# Patient Record
Sex: FEMALE | Race: WHITE | NOT HISPANIC OR LATINO | Employment: UNEMPLOYED | URBAN - METROPOLITAN AREA
[De-identification: names, ages, dates, MRNs, and addresses within clinical notes are randomized per-mention and may not be internally consistent; named-entity substitution may affect disease eponyms.]

---

## 2018-03-29 ENCOUNTER — OFFICE VISIT (OUTPATIENT)
Dept: OBGYN CLINIC | Facility: CLINIC | Age: 23
End: 2018-03-29
Payer: COMMERCIAL

## 2018-03-29 VITALS
BODY MASS INDEX: 29.25 KG/M2 | DIASTOLIC BLOOD PRESSURE: 52 MMHG | HEART RATE: 80 BPM | HEIGHT: 60 IN | WEIGHT: 149 LBS | SYSTOLIC BLOOD PRESSURE: 100 MMHG

## 2018-03-29 DIAGNOSIS — O99.281 HYPOTHYROIDISM AFFECTING PREGNANCY IN FIRST TRIMESTER: ICD-10-CM

## 2018-03-29 DIAGNOSIS — E03.9 HYPOTHYROIDISM AFFECTING PREGNANCY IN FIRST TRIMESTER: ICD-10-CM

## 2018-03-29 DIAGNOSIS — Z12.4 PAP SMEAR FOR CERVICAL CANCER SCREENING: ICD-10-CM

## 2018-03-29 DIAGNOSIS — Z34.01 SUPERVISION OF LOW-RISK FIRST PREGNANCY, FIRST TRIMESTER: Primary | ICD-10-CM

## 2018-03-29 LAB
EXTERNAL CHLAMYDIA SCREEN: NEGATIVE
EXTERNAL GONORRHEA SCREEN: NEGATIVE

## 2018-03-29 PROCEDURE — 99204 OFFICE O/P NEW MOD 45 MIN: CPT | Performed by: OBSTETRICS & GYNECOLOGY

## 2018-03-29 RX ORDER — LEVOTHYROXINE SODIUM 0.07 MG/1
75 TABLET ORAL DAILY
COMMUNITY

## 2018-03-29 NOTE — PROGRESS NOTES
Pt is a 20 y o   with Patient's last menstrual period was 2017 (exact date)  who presents for prenatal care  Her LMP was normal, on time and without hormonal disruption  Her PMHx is unremarkable/notable for hashimoto's thyroidits    This is her first pregnancy  She denies history of genital HSV; her partner denies history of genital HSV  Her ethnic background is [place]  ; his is [place]    There is population risk for CF hemoglobinopathy/thalassemia--she desires testing for both  She reports a personal history of varicella at age 9    Her family history is notable for nothing unusual  His is notable for nothing unusual     Pregnancy Symptoms:  She has noted breast fullness  She is not having difficulty with nausea/vomiting  Pt declines  screening  Pt does accept blood products if need arises       Past Medical History:   Diagnosis Date    Hashimoto's disease     Varicella        Past Surgical History:   Procedure Laterality Date    MYRINGOTOMY      NOSE SURGERY           Current Outpatient Prescriptions:     levothyroxine 75 mcg tablet, Take 75 mcg by mouth daily, Disp: , Rfl:     Allergies   Allergen Reactions    Penicillins Other (See Comments)     Will result in death       OB History    Para Term  AB Living   1         0   SAB TAB Ectopic Multiple Live Births                  # Outcome Date GA Lbr Josesito/2nd Weight Sex Delivery Anes PTL Lv   1 Current                   Gyn HX:  denies STD, abnormal pap, significant dysmenorrhea or irregular menses    Social History     Social History    Marital status: /Civil Union     Spouse name: N/A    Number of children: N/A    Years of education: college     Occupational History    Housewife      Social History Main Topics    Smoking status: Never Smoker    Smokeless tobacco: Never Used    Alcohol use No    Drug use: No    Sexual activity: Yes     Partners: Male     Birth control/ protection: None Other Topics Concern    None     Social History Narrative    Religious    Would accept blood products        Family History   Problem Relation Age of Onset    Hashimoto's thyroiditis Mother     Chronic bronchitis Father        Review of Systems   Constitutional: Positive for fatigue  Negative for chills, fever and unexpected weight change  HENT: Negative for congestion, mouth sores and sore throat  Respiratory: Negative for cough, chest tightness, shortness of breath and wheezing  Cardiovascular: Negative for chest pain and palpitations  Gastrointestinal: Negative for abdominal distention, abdominal pain, constipation, diarrhea, nausea and vomiting  Endocrine: Negative for cold intolerance and heat intolerance  Genitourinary: Negative for dyspareunia, dysuria, genital sores, menstrual problem, pelvic pain, vaginal bleeding, vaginal discharge and vaginal pain  Musculoskeletal: Negative for arthralgias  Skin: Negative for color change and rash  Neurological: Negative for dizziness, light-headedness and headaches  Hematological: Negative for adenopathy  Blood pressure 100/52, pulse 80, height 4' 11 84" (1 52 m), weight 67 6 kg (149 lb), last menstrual period 12/30/2017, not currently breastfeeding  and Body mass index is 29 25 kg/m²  Physical Exam   Constitutional: She is oriented to person, place, and time  She appears well-developed and well-nourished  HENT:   Head: Normocephalic and atraumatic  Eyes: Conjunctivae and EOM are normal    Neck: Normal range of motion  Neck supple  No tracheal deviation present  No thyromegaly present  Cardiovascular: Normal rate, regular rhythm and normal heart sounds  Exam reveals no gallop and no friction rub  No murmur heard  Pulmonary/Chest: Effort normal and breath sounds normal  No stridor  No respiratory distress  She has no wheezes  She has no rales  Abdominal: Soft  Bowel sounds are normal  She exhibits no distension   There is no tenderness  There is no rebound and no guarding  Musculoskeletal: Normal range of motion  She exhibits no edema or tenderness  Neurological: She is alert and oriented to person, place, and time  Skin: Skin is warm and dry  No rash noted  No erythema  Psychiatric: She has a normal mood and affect  Her behavior is normal  Thought content normal      Breasts: breasts appear normal, no suspicious masses, no skin or nipple changes or axillary nodes, symmetric fibrous changes in both upper outer quadrants, right breast normal without mass, skin or nipple changes or axillary nodes, left breast normal without mass, skin or nipple changes or axillary nodes  vulva: normal external genitalia for age and no lesions, masses, epithelial changes, or exudate  vagina: color pink, rugae  well formed rugae, discharge  white and bleeding  without any bleeding  cervix: nullip, no lesions  and pap obtained  uterus: NSSC, AF, NT, mobile and 12 wks  adnexa: no masses or tenderness      A/P: Pt is a 20 y o   with    Pt has been counseled re diet, exercise, weight gain, foods to avoid, BF, vaccines in pregnancy, trisomy screening, vector borne illness and prevention, routine dental care, travel precautions to include seat belt use and VTE risk reduction  She has been provided our pregnancy packet which includes how and when to contact providers, medication recommendations, dietary suggestions, breastfeeding information as well as websites for additional information, hospital and delivery concerns, etc     Diagnoses and all orders for this visit:    Supervision of low-risk first pregnancy, first trimester  -     Thinprep Tis Pap Reflex HPV mRNA E6/E7, Chlamydia/N gonorrhoeae  -     Cystic fibrosis gene test; Future  -     Hemoglobin Electrophoresis; Future  -     HIV 1/2 AG-AB combo; Future  -     Obstetric panel; Future  -     T4, free; Future  -     TSH, 3rd generation;  Future    Hypothyroidism affecting pregnancy in first trimester  -     Cystic fibrosis gene test; Future  -     Hemoglobin Electrophoresis; Future  -     HIV 1/2 AG-AB combo; Future  -     Obstetric panel; Future  -     T4, free; Future  -     TSH, 3rd generation;  Future    Pap smear for cervical cancer screening  -     Thinprep Tis Pap Reflex HPV mRNA E6/E7, Chlamydia/N gonorrhoeae

## 2018-04-02 LAB
C TRACH RRNA SPEC QL NAA+PROBE: NOT DETECTED
CLINICAL INFO: NORMAL
CYTO CVX: NORMAL
CYTOLOGY CMNT CVX/VAG CYTO-IMP: NORMAL
DATE PREVIOUS BX: NORMAL
LMP START DATE: NORMAL
N GONORRHOEA RRNA SPEC QL NAA+PROBE: NOT DETECTED
SL AMB PREV. PAP:: NORMAL
SPECIMEN SOURCE CVX/VAG CYTO: NORMAL

## 2018-04-11 ENCOUNTER — OB ABSTRACT (OUTPATIENT)
Dept: OBGYN CLINIC | Facility: CLINIC | Age: 23
End: 2018-04-11

## 2018-04-26 ENCOUNTER — ROUTINE PRENATAL (OUTPATIENT)
Dept: OBGYN CLINIC | Facility: CLINIC | Age: 23
End: 2018-04-26
Payer: COMMERCIAL

## 2018-04-26 VITALS
BODY MASS INDEX: 30.04 KG/M2 | DIASTOLIC BLOOD PRESSURE: 64 MMHG | OXYGEN SATURATION: 99 % | HEART RATE: 87 BPM | WEIGHT: 153 LBS | SYSTOLIC BLOOD PRESSURE: 110 MMHG

## 2018-04-26 DIAGNOSIS — Z34.02 SUPERVISION OF LOW-RISK FIRST PREGNANCY, SECOND TRIMESTER: ICD-10-CM

## 2018-04-26 DIAGNOSIS — O99.282 HYPOTHYROIDISM AFFECTING PREGNANCY IN SECOND TRIMESTER: Primary | ICD-10-CM

## 2018-04-26 DIAGNOSIS — E03.9 HYPOTHYROIDISM AFFECTING PREGNANCY IN SECOND TRIMESTER: Primary | ICD-10-CM

## 2018-04-26 PROCEDURE — 99213 OFFICE O/P EST LOW 20 MIN: CPT | Performed by: OBSTETRICS & GYNECOLOGY

## 2018-04-26 NOTE — PROGRESS NOTES
Pt reports she feels well  Unsure if she has had quickening yet  She denies vb, lof ctx  PTL Precautions reviewed  Has not had pnl labs drawn yet  Encouraged to do so  F/U 4 weeks  Has anatomy scan scheduled

## 2018-04-28 PROBLEM — O26.892 RH NEGATIVE, ANTEPARTUM, SECOND TRIMESTER: Status: ACTIVE | Noted: 2018-04-28

## 2018-04-28 PROBLEM — Z67.91 RH NEGATIVE, ANTEPARTUM, SECOND TRIMESTER: Status: ACTIVE | Noted: 2018-04-28

## 2018-05-04 LAB
ABO GROUP BLD: ABNORMAL
BASOPHILS # BLD AUTO: 12 CELLS/UL (ref 0–200)
BASOPHILS NFR BLD AUTO: 0.2 %
BLD GP AB SCN SERPL QL: ABNORMAL
CFTR MUT ANL BLD/T: NORMAL
CFTR MUT ANL BLD/T: NORMAL
CFTR MUT TESTED BLD/T: NORMAL
EOSINOPHIL # BLD AUTO: 53 CELLS/UL (ref 15–500)
EOSINOPHIL NFR BLD AUTO: 0.9 %
ERYTHROCYTE [DISTWIDTH] IN BLOOD BY AUTOMATED COUNT: 13.3 % (ref 11–15)
ERYTHROCYTE [DISTWIDTH] IN BLOOD BY AUTOMATED COUNT: 13.3 % (ref 11–15)
HBV SURFACE AG SERPL QL IA: ABNORMAL
HCT VFR BLD AUTO: 34 % (ref 35–45)
HCT VFR BLD AUTO: 34 % (ref 35–45)
HGB A MFR BLD: 96.2 %
HGB A2 MFR BLD: 2.2 % (ref 1.8–3.5)
HGB BLD-MCNC: 11.5 G/DL (ref 11.7–15.5)
HGB BLD-MCNC: 11.5 G/DL (ref 11.7–15.5)
HGB F MFR BLD: 1.6 %
HGB FRACT BLD-IMP: ABNORMAL
HIV 1+2 AB+HIV1 P24 AG SERPL QL IA: NORMAL
LYMPHOCYTES # BLD AUTO: 1634 CELLS/UL (ref 850–3900)
LYMPHOCYTES NFR BLD AUTO: 27.7 %
MCH RBC QN AUTO: 29.2 PG (ref 27–33)
MCH RBC QN AUTO: 29.2 PG (ref 27–33)
MCHC RBC AUTO-ENTMCNC: 33.8 G/DL (ref 32–36)
MCV RBC AUTO: 86.3 FL (ref 80–100)
MCV RBC AUTO: 86.3 FL (ref 80–100)
MONOCYTES # BLD AUTO: 413 CELLS/UL (ref 200–950)
MONOCYTES NFR BLD AUTO: 7 %
NEUTROPHILS # BLD AUTO: 3788 CELLS/UL (ref 1500–7800)
NEUTROPHILS NFR BLD AUTO: 64.2 %
PLATELET # BLD AUTO: 248 THOUSAND/UL (ref 140–400)
PMV BLD REES-ECKER: 10.5 FL (ref 7.5–12.5)
RBC # BLD AUTO: 3.94 MILLION/UL (ref 3.8–5.1)
RBC # BLD AUTO: 3.94 MILLION/UL (ref 3.8–5.1)
REF LAB TEST METHOD: NORMAL
RH BLD: ABNORMAL
RPR SER QL: ABNORMAL
RUBV IGG SERPL IA-ACNC: 2.02 INDEX
SERVICE CMNT-IMP: NORMAL
T4 FREE SERPL-MCNC: 1 NG/DL (ref 0.8–1.8)
TSH SERPL-ACNC: 1.9 MIU/L
WBC # BLD AUTO: 5.9 THOUSAND/UL (ref 3.8–10.8)

## 2018-05-13 PROBLEM — Z67.91 RH NEGATIVE, ANTEPARTUM, FIRST TRIMESTER: Status: ACTIVE | Noted: 2018-05-13

## 2018-05-13 PROBLEM — O26.891 RH NEGATIVE, ANTEPARTUM, FIRST TRIMESTER: Status: ACTIVE | Noted: 2018-05-13

## 2018-05-22 ENCOUNTER — ULTRASOUND (OUTPATIENT)
Dept: OBGYN CLINIC | Facility: CLINIC | Age: 23
End: 2018-05-22
Payer: COMMERCIAL

## 2018-05-22 DIAGNOSIS — Z36.89 ENCOUNTER FOR FETAL ANATOMIC SURVEY: Primary | ICD-10-CM

## 2018-05-22 DIAGNOSIS — Z36.89 ENCOUNTER FOR ULTRASOUND TO ASSESS FETAL GROWTH: ICD-10-CM

## 2018-05-22 PROCEDURE — 76805 OB US >/= 14 WKS SNGL FETUS: CPT

## 2018-05-22 NOTE — Clinical Note
Please notify the patient that her anatomic survey appears normal, but that the heart was not clearly seen due to fetal position--will need follow up to 4 chamber heart, RVOT, LVOT at 28 weeks  Please coordinate with her visit   Thanks

## 2018-05-24 PROBLEM — O26.891 RH NEGATIVE, ANTEPARTUM, FIRST TRIMESTER: Status: RESOLVED | Noted: 2018-05-13 | Resolved: 2018-05-24

## 2018-05-24 PROBLEM — Z67.91 RH NEGATIVE, ANTEPARTUM, FIRST TRIMESTER: Status: RESOLVED | Noted: 2018-05-13 | Resolved: 2018-05-24

## 2018-06-05 ENCOUNTER — ROUTINE PRENATAL (OUTPATIENT)
Dept: OBGYN CLINIC | Facility: CLINIC | Age: 23
End: 2018-06-05

## 2018-06-05 VITALS
OXYGEN SATURATION: 99 % | HEART RATE: 100 BPM | BODY MASS INDEX: 30.04 KG/M2 | WEIGHT: 153 LBS | SYSTOLIC BLOOD PRESSURE: 100 MMHG | DIASTOLIC BLOOD PRESSURE: 62 MMHG

## 2018-06-05 DIAGNOSIS — Z34.02 SUPERVISION OF LOW-RISK FIRST PREGNANCY, SECOND TRIMESTER: ICD-10-CM

## 2018-06-05 DIAGNOSIS — Z67.91 RH NEGATIVE, ANTEPARTUM, SECOND TRIMESTER: Primary | ICD-10-CM

## 2018-06-05 DIAGNOSIS — E03.9 HYPOTHYROIDISM AFFECTING PREGNANCY IN SECOND TRIMESTER: ICD-10-CM

## 2018-06-05 DIAGNOSIS — O26.892 RH NEGATIVE, ANTEPARTUM, SECOND TRIMESTER: Primary | ICD-10-CM

## 2018-06-05 DIAGNOSIS — O99.282 HYPOTHYROIDISM AFFECTING PREGNANCY IN SECOND TRIMESTER: ICD-10-CM

## 2018-06-05 PROCEDURE — PNV: Performed by: OBSTETRICS & GYNECOLOGY

## 2018-06-05 NOTE — PROGRESS NOTES
Pt w o complaints  She reports +FM  Denies vb, lof, ctx  PTL precautions reviewed  Pt had her anatomy scan with --need copy from   Reviewed with patient her pnl labs are wnl  Reviewed that her blood type is A negative  She reports her  is A+  Reviewed need for Rhogam with bleeding or at 28 weeks and again at delivery  F/U 4 weeks

## 2018-06-28 ENCOUNTER — ROUTINE PRENATAL (OUTPATIENT)
Dept: OBGYN CLINIC | Facility: CLINIC | Age: 23
End: 2018-06-28

## 2018-06-28 VITALS — BODY MASS INDEX: 31.22 KG/M2 | WEIGHT: 159 LBS | SYSTOLIC BLOOD PRESSURE: 120 MMHG | DIASTOLIC BLOOD PRESSURE: 64 MMHG

## 2018-06-28 DIAGNOSIS — O26.892 RH NEGATIVE, ANTEPARTUM, SECOND TRIMESTER: ICD-10-CM

## 2018-06-28 DIAGNOSIS — O99.282 HYPOTHYROIDISM AFFECTING PREGNANCY IN SECOND TRIMESTER: Primary | ICD-10-CM

## 2018-06-28 DIAGNOSIS — Z34.02 SUPERVISION OF LOW-RISK FIRST PREGNANCY, SECOND TRIMESTER: ICD-10-CM

## 2018-06-28 DIAGNOSIS — Z67.91 RH NEGATIVE, ANTEPARTUM, SECOND TRIMESTER: ICD-10-CM

## 2018-06-28 DIAGNOSIS — E03.9 HYPOTHYROIDISM AFFECTING PREGNANCY IN SECOND TRIMESTER: Primary | ICD-10-CM

## 2018-06-28 PROCEDURE — PNV: Performed by: OBSTETRICS & GYNECOLOGY

## 2018-06-28 NOTE — PROGRESS NOTES
Pt reports recent cold with sinus pressure  She reports she had a pain x 1 minute x 1, unsure if contraction or not  Pt reports +FM  Denies vb, lof  PTL precautions reviewed  3d trimester lab slips given  F/u 4 weeks  RHOGAM next visit

## 2018-07-09 LAB
EXTERNAL ABO GROUPING: NORMAL
EXTERNAL HEMATOCRIT: 30.5 %
EXTERNAL HEMOGLOBIN: 10.1 G/DL
EXTERNAL PLATELET COUNT: 186 K/ΜL
EXTERNAL RH FACTOR: NEGATIVE
EXTERNAL SYPHILIS RPR SCREEN: NORMAL

## 2018-07-10 LAB
ABO GROUP BLD: NORMAL
BASOPHILS # BLD AUTO: 20 CELLS/UL (ref 0–200)
BASOPHILS NFR BLD AUTO: 0.3 %
EOSINOPHIL # BLD AUTO: 39 CELLS/UL (ref 15–500)
EOSINOPHIL NFR BLD AUTO: 0.6 %
ERYTHROCYTE [DISTWIDTH] IN BLOOD BY AUTOMATED COUNT: 13.1 % (ref 11–15)
GLUCOSE 1H P 50 G GLC PO SERPL-MCNC: 153 MG/DL
HCT VFR BLD AUTO: 30.5 % (ref 35–45)
HGB BLD-MCNC: 10.1 G/DL (ref 11.7–15.5)
LYMPHOCYTES # BLD AUTO: 1703 CELLS/UL (ref 850–3900)
LYMPHOCYTES NFR BLD AUTO: 26.2 %
MCH RBC QN AUTO: 29.5 PG (ref 27–33)
MCHC RBC AUTO-ENTMCNC: 33.1 G/DL (ref 32–36)
MCV RBC AUTO: 89.2 FL (ref 80–100)
MONOCYTES # BLD AUTO: 254 CELLS/UL (ref 200–950)
MONOCYTES NFR BLD AUTO: 3.9 %
NEUTROPHILS # BLD AUTO: 4485 CELLS/UL (ref 1500–7800)
NEUTROPHILS NFR BLD AUTO: 69 %
PLATELET # BLD AUTO: 186 THOUSAND/UL (ref 140–400)
PMV BLD REES-ECKER: 11 FL (ref 7.5–12.5)
RBC # BLD AUTO: 3.42 MILLION/UL (ref 3.8–5.1)
RH BLD: NORMAL
RPR SER QL: NORMAL
T4 FREE SERPL-MCNC: 1 NG/DL (ref 0.8–1.8)
TSH SERPL-ACNC: 2.27 MIU/L
WBC # BLD AUTO: 6.5 THOUSAND/UL (ref 3.8–10.8)

## 2018-07-19 DIAGNOSIS — O28.9 ABNORMAL ANTENATAL TEST: Primary | ICD-10-CM

## 2018-07-19 PROBLEM — O99.013 ANEMIA AFFECTING PREGNANCY IN THIRD TRIMESTER: Status: ACTIVE | Noted: 2018-07-19

## 2018-07-19 NOTE — PROGRESS NOTES
Please notify the patient that her 1 hour GTT was elevated and that I recommend a 3 hour gtt  Orders have been placed in her chart  She also has a mild anemia and I recommend she takes iron once daily  Her TFTs are normal  She should receive rhogam at her next visit   Thanks

## 2018-07-20 ENCOUNTER — ULTRASOUND (OUTPATIENT)
Dept: OBGYN CLINIC | Facility: CLINIC | Age: 23
End: 2018-07-20
Payer: COMMERCIAL

## 2018-07-20 ENCOUNTER — ROUTINE PRENATAL (OUTPATIENT)
Dept: OBGYN CLINIC | Facility: CLINIC | Age: 23
End: 2018-07-20
Payer: COMMERCIAL

## 2018-07-20 VITALS
DIASTOLIC BLOOD PRESSURE: 70 MMHG | BODY MASS INDEX: 31.57 KG/M2 | OXYGEN SATURATION: 99 % | HEART RATE: 85 BPM | WEIGHT: 160.8 LBS | SYSTOLIC BLOOD PRESSURE: 114 MMHG

## 2018-07-20 DIAGNOSIS — Z67.91 RH NEGATIVE, ANTEPARTUM, THIRD TRIMESTER: ICD-10-CM

## 2018-07-20 DIAGNOSIS — O99.013 ANEMIA AFFECTING PREGNANCY IN THIRD TRIMESTER: ICD-10-CM

## 2018-07-20 DIAGNOSIS — O99.283 HYPOTHYROIDISM AFFECTING PREGNANCY IN THIRD TRIMESTER: Primary | ICD-10-CM

## 2018-07-20 DIAGNOSIS — O26.893 RH NEGATIVE, ANTEPARTUM, THIRD TRIMESTER: ICD-10-CM

## 2018-07-20 DIAGNOSIS — E03.9 HYPOTHYROIDISM AFFECTING PREGNANCY IN THIRD TRIMESTER: Primary | ICD-10-CM

## 2018-07-20 DIAGNOSIS — IMO0002 EVALUATE ANATOMY NOT SEEN ON PRIOR SONOGRAM: Primary | ICD-10-CM

## 2018-07-20 PROCEDURE — 76816 OB US FOLLOW-UP PER FETUS: CPT | Performed by: OBSTETRICS & GYNECOLOGY

## 2018-07-20 PROCEDURE — PNV: Performed by: OBSTETRICS & GYNECOLOGY

## 2018-07-20 NOTE — PROGRESS NOTES
Pt w o complaints  She reports +FM  Denies vb, lof, ctx  PTL precautions and fkc reviewed  3d trimester labs reviewed with patient TFTS wnl  1 hour gtt elevated, mild anemia noted and iron recommended  RPR non reactive  3 hour GTT slips given  Rhogam today

## 2018-07-27 LAB
GLUCOSE 1H P CHAL SERPL-MCNC: 117 MG/DL
GLUCOSE 2H P CHAL SERPL-MCNC: 96 MG/DL
GLUCOSE 3H P 100 G GLC PO SERPL-MCNC: 128 MG/DL
GLUCOSE P FAST SERPL-MCNC: 72 MG/DL (ref 65–94)

## 2018-08-02 ENCOUNTER — ROUTINE PRENATAL (OUTPATIENT)
Dept: OBGYN CLINIC | Facility: CLINIC | Age: 23
End: 2018-08-02

## 2018-08-02 VITALS — SYSTOLIC BLOOD PRESSURE: 102 MMHG | WEIGHT: 162 LBS | DIASTOLIC BLOOD PRESSURE: 68 MMHG | BODY MASS INDEX: 31.8 KG/M2

## 2018-08-02 DIAGNOSIS — O99.013 ANEMIA AFFECTING PREGNANCY IN THIRD TRIMESTER: ICD-10-CM

## 2018-08-02 DIAGNOSIS — Z67.91 RH NEGATIVE, ANTEPARTUM, THIRD TRIMESTER: ICD-10-CM

## 2018-08-02 DIAGNOSIS — O26.893 RH NEGATIVE, ANTEPARTUM, THIRD TRIMESTER: ICD-10-CM

## 2018-08-02 DIAGNOSIS — E03.9 HYPOTHYROIDISM AFFECTING PREGNANCY IN THIRD TRIMESTER: Primary | ICD-10-CM

## 2018-08-02 DIAGNOSIS — O99.283 HYPOTHYROIDISM AFFECTING PREGNANCY IN THIRD TRIMESTER: Primary | ICD-10-CM

## 2018-08-02 PROCEDURE — PNV: Performed by: OBSTETRICS & GYNECOLOGY

## 2018-08-02 RX ORDER — PNV NO.95/FERROUS FUM/FOLIC AC 28MG-0.8MG
TABLET ORAL
COMMUNITY

## 2018-08-02 NOTE — PROGRESS NOTES
Pt w o complaints  She reports +FM  Denies vb, lof, ctx  PTL precautions and fkc reviewed  3 hour gtt wnl and reviewed with patient  F/u 2 weeks  Information given regarding TDAP as pt is undecided regarding vaccination in pregnancy

## 2018-08-16 ENCOUNTER — ROUTINE PRENATAL (OUTPATIENT)
Dept: OBGYN CLINIC | Facility: CLINIC | Age: 23
End: 2018-08-16
Payer: COMMERCIAL

## 2018-08-16 VITALS
HEART RATE: 105 BPM | DIASTOLIC BLOOD PRESSURE: 58 MMHG | SYSTOLIC BLOOD PRESSURE: 110 MMHG | OXYGEN SATURATION: 99 % | BODY MASS INDEX: 32.35 KG/M2 | WEIGHT: 164.8 LBS

## 2018-08-16 DIAGNOSIS — B37.2 YEAST DERMATITIS: ICD-10-CM

## 2018-08-16 DIAGNOSIS — Z23 NEED FOR TDAP VACCINATION: ICD-10-CM

## 2018-08-16 DIAGNOSIS — E03.9 HYPOTHYROIDISM AFFECTING PREGNANCY IN THIRD TRIMESTER: Primary | ICD-10-CM

## 2018-08-16 DIAGNOSIS — O26.893 RH NEGATIVE, ANTEPARTUM, THIRD TRIMESTER: ICD-10-CM

## 2018-08-16 DIAGNOSIS — O99.013 ANEMIA AFFECTING PREGNANCY IN THIRD TRIMESTER: ICD-10-CM

## 2018-08-16 DIAGNOSIS — Z67.91 RH NEGATIVE, ANTEPARTUM, THIRD TRIMESTER: ICD-10-CM

## 2018-08-16 DIAGNOSIS — O99.283 HYPOTHYROIDISM AFFECTING PREGNANCY IN THIRD TRIMESTER: Primary | ICD-10-CM

## 2018-08-16 PROCEDURE — 90715 TDAP VACCINE 7 YRS/> IM: CPT

## 2018-08-16 PROCEDURE — PNV: Performed by: OBSTETRICS & GYNECOLOGY

## 2018-08-16 PROCEDURE — 90471 IMMUNIZATION ADMIN: CPT

## 2018-08-16 RX ORDER — NYSTATIN 100000 [USP'U]/G
POWDER TOPICAL 2 TIMES DAILY
Qty: 45 G | Refills: 0 | Status: ON HOLD | OUTPATIENT
Start: 2018-08-16 | End: 2018-10-08 | Stop reason: ALTCHOICE

## 2018-08-16 NOTE — PROGRESS NOTES
Pt reports dark spots between her breasts and on her groin that are spreading  She reports +FM  Denies vb, lof, ctx  PTL precautions and fkc reviewed  TDAP today  Areas examined and are consistent with yeast dermatitis--antifungal powder prescribed  F/u 2 week

## 2018-08-30 ENCOUNTER — ROUTINE PRENATAL (OUTPATIENT)
Dept: OBGYN CLINIC | Facility: CLINIC | Age: 23
End: 2018-08-30

## 2018-08-30 VITALS — WEIGHT: 168 LBS | BODY MASS INDEX: 32.98 KG/M2 | DIASTOLIC BLOOD PRESSURE: 64 MMHG | SYSTOLIC BLOOD PRESSURE: 98 MMHG

## 2018-08-30 DIAGNOSIS — E03.9 HYPOTHYROIDISM AFFECTING PREGNANCY IN THIRD TRIMESTER: Primary | ICD-10-CM

## 2018-08-30 DIAGNOSIS — O99.013 ANEMIA AFFECTING PREGNANCY IN THIRD TRIMESTER: ICD-10-CM

## 2018-08-30 DIAGNOSIS — O26.893 RH NEGATIVE, ANTEPARTUM, THIRD TRIMESTER: ICD-10-CM

## 2018-08-30 DIAGNOSIS — Z67.91 RH NEGATIVE, ANTEPARTUM, THIRD TRIMESTER: ICD-10-CM

## 2018-08-30 DIAGNOSIS — O99.283 HYPOTHYROIDISM AFFECTING PREGNANCY IN THIRD TRIMESTER: Primary | ICD-10-CM

## 2018-08-30 PROCEDURE — PNV: Performed by: OBSTETRICS & GYNECOLOGY

## 2018-08-30 NOTE — PROGRESS NOTES
Pt reports that her rash has resolved with the powder we gave her last visit  She reports +FM  Denies vb, lof, ctx  PTL precautions and fkc reviewed  GBS and consents next visit  Pt requesting growth u/s at term  F/U 1-2 weeks

## 2018-09-06 ENCOUNTER — ROUTINE PRENATAL (OUTPATIENT)
Dept: OBGYN CLINIC | Facility: CLINIC | Age: 23
End: 2018-09-06

## 2018-09-06 VITALS
BODY MASS INDEX: 33.06 KG/M2 | SYSTOLIC BLOOD PRESSURE: 110 MMHG | DIASTOLIC BLOOD PRESSURE: 70 MMHG | OXYGEN SATURATION: 98 % | HEART RATE: 106 BPM | WEIGHT: 168.4 LBS

## 2018-09-06 DIAGNOSIS — Z67.91 RH NEGATIVE, ANTEPARTUM, THIRD TRIMESTER: ICD-10-CM

## 2018-09-06 DIAGNOSIS — O99.013 ANEMIA AFFECTING PREGNANCY IN THIRD TRIMESTER: ICD-10-CM

## 2018-09-06 DIAGNOSIS — O26.893 RH NEGATIVE, ANTEPARTUM, THIRD TRIMESTER: ICD-10-CM

## 2018-09-06 DIAGNOSIS — O99.283 HYPOTHYROIDISM AFFECTING PREGNANCY IN THIRD TRIMESTER: Primary | ICD-10-CM

## 2018-09-06 DIAGNOSIS — E03.9 HYPOTHYROIDISM AFFECTING PREGNANCY IN THIRD TRIMESTER: Primary | ICD-10-CM

## 2018-09-06 PROCEDURE — PNV: Performed by: OBSTETRICS & GYNECOLOGY

## 2018-09-06 NOTE — PROGRESS NOTES
Pt is without complaints  She reports +FM  Denies vb, lof  Occ ctx  Labor precautions and fkc reviewed  Pt brings birth plan with her--reviewed and scanned into chart  GBS collected to be run with sensitivities due to PCN allergy  Consents reviewed and signed  F/u 1 week   U/S for growth with next visit

## 2018-09-12 ENCOUNTER — HOSPITAL ENCOUNTER (OUTPATIENT)
Facility: HOSPITAL | Age: 23
Discharge: HOME | End: 2018-09-12
Attending: OBSTETRICS & GYNECOLOGY | Admitting: OBSTETRICS & GYNECOLOGY
Payer: COMMERCIAL

## 2018-09-12 VITALS
BODY MASS INDEX: 28.68 KG/M2 | RESPIRATION RATE: 16 BRPM | WEIGHT: 168 LBS | OXYGEN SATURATION: 99 % | HEIGHT: 64 IN | SYSTOLIC BLOOD PRESSURE: 110 MMHG | HEART RATE: 75 BPM | TEMPERATURE: 98.2 F | DIASTOLIC BLOOD PRESSURE: 69 MMHG

## 2018-09-12 PROBLEM — Z3A.36 36 WEEKS GESTATION OF PREGNANCY: Status: ACTIVE | Noted: 2018-09-12

## 2018-09-12 PROCEDURE — 99213 OFFICE O/P EST LOW 20 MIN: CPT

## 2018-09-13 NOTE — DISCHARGE INSTRUCTIONS
Pregnancy at 28 to 38 Weeks   AMBULATORY CARE:   What changes are happening to your body: You are considered full term at the beginning of 37 weeks  Your breathing may be easier if your baby has moved down into a head-down position  You may need to urinate more often because the baby may be pressing on your bladder  You may also feel more discomfort and get tired easily  Seek care immediately if:   · You develop a severe headache that does not go away  · You have new or increased vision changes, such as blurred or spotted vision  · You have new or increased swelling in your face or hands  · You have vaginal spotting or bleeding  · Your water broke or you feel warm water gushing or trickling from your vagina  Contact your healthcare provider if:   · You have more than 5 contractions in 1 hour  · You notice any changes in your baby's movements  · You have abdominal cramps, pressure, or tightening  · You have a change in vaginal discharge  · You have chills or a fever  · You have vaginal itching, burning, or pain  · You have yellow, green, white, or foul-smelling vaginal discharge  · You have pain or burning when you urinate, less urine than usual, or pink or bloody urine  · You have questions or concerns about your condition or care  How to care for yourself at this stage of your pregnancy:   · Eat a variety of healthy foods  Healthy foods include fruits, vegetables, whole-grain breads, low-fat dairy foods, beans, lean meats, and fish  Drink liquids as directed  Ask how much liquid to drink each day and which liquids are best for you  Limit caffeine to less than 200 milligrams each day  Limit your intake of fish to 2 servings each week  Choose fish low in mercury such as canned light tuna, shrimp, crab, salmon, cod, or tilapia  Do not  eat fish high in mercury such as swordfish, tilefish, frankie mackerel, and shark  · Take prenatal vitamins as directed    Your need for certain vitamins and minerals, such as folic acid, increases during pregnancy  Prenatal vitamins provide some of the extra vitamins and minerals you need  Prenatal vitamins may also help to decrease the risk of certain birth defects  · Rest as needed  Put your feet up if you have swelling in your ankles and feet  · Do not smoke  If you smoke, it is never too late to quit  Smoking increases your risk of a miscarriage and other health problems during your pregnancy  Smoking can cause your baby to be born too early or weigh less at birth  Ask your healthcare provider for information if you need help quitting  · Do not drink alcohol  Alcohol passes from your body to your baby through the placenta  It can affect your baby's brain development and cause fetal alcohol syndrome (FAS)  FAS is a group of conditions that causes mental, behavior, and growth problems  · Talk to your healthcare provider before you take any medicines  Many medicines may harm your baby if you take them when you are pregnant  Do not take any medicines, vitamins, herbs, or supplements without first talking to your healthcare provider  Never use illegal or street drugs (such as marijuana or cocaine) while you are pregnant  · Talk to your healthcare provider before you travel  You may not be able to travel in an airplane after 36 weeks  He may also recommend that you avoid long road trips  Safety tips during pregnancy:   · Avoid hot tubs and saunas  Do not use a hot tub or sauna while you are pregnant, especially during your first trimester  Hot tubs and saunas may raise your baby's temperature and increase the risk of birth defects  · Avoid toxoplasmosis  This is an infection caused by eating raw meat or being around infected cat feces  It can cause birth defects, miscarriages, and other problems  Wash your hands after you touch raw meat  Make sure any meat is well-cooked before you eat it   Avoid raw eggs and unpasteurized milk  Use gloves or ask someone else to clean your cat's litter box while you are pregnant  · Ask your healthcare provider about travel  The most comfortable time to travel is during the second trimester  Ask your healthcare provider if you can travel after 36 weeks  You may not be able to travel in an airplane after 36 weeks  He may also recommend that you avoid long road trips  Changes that are happening with your baby:  By 38 weeks, your baby may weigh between 6 and 9 pounds  Your baby may be about 14 inches long from the top of the head to the rump (baby's bottom)  Your baby hears well enough to know your voice  As your baby gets larger, you may feel fewer kicks and more stretching and rolling  Your baby may move into a head-down position  Your baby will also rest lower in your abdomen  What you need to know about prenatal care: Your healthcare provider will check your blood pressure and weight  You may also need the following:  · A urine test  may also be done to check for sugar and protein  These can be signs of gestational diabetes or infection  Protein in your urine may also be a sign of preeclampsia  Preeclampsia is a condition that can develop during week 20 or later of your pregnancy  It causes high blood pressure, and it can cause problems with your kidneys and other organs  · A blood test  may be done to check for anemia (low iron level)  · A Tdap vaccine  may be recommended by your healthcare provider  · A group B strep test  is a test that is done to check for group B strep infection  Group B strep is a type of bacteria that may be found in the vagina or rectum  It can be passed to your baby during delivery if you have it  Your healthcare provider will take swab your vagina or rectum and send the sample to the lab for tests  · Fundal height  is a measurement of your uterus to check your baby's growth   This number is usually the same as the number of weeks that you have been pregnant  Your healthcare provider may also check your baby's position  · Your baby's heart rate  will be checked  © 2017 [address] [employer] Information is for End User's use only and may not be sold, redistributed or otherwise used for commercial purposes  All illustrations and images included in CareNotes® are the copyrighted property of [employer]  or [employer]  The above information is an  only  It is not intended as medical advice for individual conditions or treatments  Talk to your doctor, nurse or pharmacist before following any medical regimen to see if it is safe and effective for you

## 2018-09-14 ENCOUNTER — ULTRASOUND (OUTPATIENT)
Dept: OBGYN CLINIC | Facility: CLINIC | Age: 23
End: 2018-09-14
Payer: COMMERCIAL

## 2018-09-14 ENCOUNTER — ROUTINE PRENATAL (OUTPATIENT)
Dept: OBGYN CLINIC | Facility: CLINIC | Age: 23
End: 2018-09-14
Payer: COMMERCIAL

## 2018-09-14 VITALS — BODY MASS INDEX: 29.18 KG/M2 | DIASTOLIC BLOOD PRESSURE: 64 MMHG | SYSTOLIC BLOOD PRESSURE: 108 MMHG | WEIGHT: 170 LBS

## 2018-09-14 DIAGNOSIS — O99.013 ANEMIA AFFECTING PREGNANCY IN THIRD TRIMESTER: ICD-10-CM

## 2018-09-14 DIAGNOSIS — Z67.91 RH NEGATIVE, ANTEPARTUM, THIRD TRIMESTER: ICD-10-CM

## 2018-09-14 DIAGNOSIS — Z36.89 ENCOUNTER FOR ULTRASOUND TO ASSESS FETAL GROWTH: Primary | ICD-10-CM

## 2018-09-14 DIAGNOSIS — E03.9 HYPOTHYROIDISM AFFECTING PREGNANCY IN THIRD TRIMESTER: Primary | ICD-10-CM

## 2018-09-14 DIAGNOSIS — O99.283 HYPOTHYROIDISM AFFECTING PREGNANCY IN THIRD TRIMESTER: Primary | ICD-10-CM

## 2018-09-14 DIAGNOSIS — O26.893 RH NEGATIVE, ANTEPARTUM, THIRD TRIMESTER: ICD-10-CM

## 2018-09-14 PROCEDURE — 76816 OB US FOLLOW-UP PER FETUS: CPT | Performed by: OBSTETRICS & GYNECOLOGY

## 2018-09-14 PROCEDURE — 99213 OFFICE O/P EST LOW 20 MIN: CPT | Performed by: OBSTETRICS & GYNECOLOGY

## 2018-09-14 NOTE — PROGRESS NOTES
Pt w o complaints  She reports +FM  Denies vb, lof, ctx  GBS negative and reviewed with patient  EFW 53% today  Labor precautions and fkc reviewed   F/U 1 week

## 2018-09-20 ENCOUNTER — ROUTINE PRENATAL (OUTPATIENT)
Dept: OBGYN CLINIC | Facility: CLINIC | Age: 23
End: 2018-09-20
Payer: COMMERCIAL

## 2018-09-20 VITALS
HEIGHT: 64 IN | HEART RATE: 94 BPM | DIASTOLIC BLOOD PRESSURE: 66 MMHG | OXYGEN SATURATION: 98 % | WEIGHT: 173 LBS | BODY MASS INDEX: 29.53 KG/M2 | SYSTOLIC BLOOD PRESSURE: 110 MMHG

## 2018-09-20 DIAGNOSIS — O99.283 HYPOTHYROIDISM AFFECTING PREGNANCY IN THIRD TRIMESTER: Primary | ICD-10-CM

## 2018-09-20 DIAGNOSIS — O26.893 RH NEGATIVE, ANTEPARTUM, THIRD TRIMESTER: ICD-10-CM

## 2018-09-20 DIAGNOSIS — E03.9 HYPOTHYROIDISM AFFECTING PREGNANCY IN THIRD TRIMESTER: Primary | ICD-10-CM

## 2018-09-20 DIAGNOSIS — O99.013 ANEMIA AFFECTING PREGNANCY IN THIRD TRIMESTER: ICD-10-CM

## 2018-09-20 DIAGNOSIS — Z67.91 RH NEGATIVE, ANTEPARTUM, THIRD TRIMESTER: ICD-10-CM

## 2018-09-20 PROBLEM — Z3A.36 36 WEEKS GESTATION OF PREGNANCY: Status: RESOLVED | Noted: 2018-09-12 | Resolved: 2018-09-20

## 2018-09-20 PROCEDURE — 99213 OFFICE O/P EST LOW 20 MIN: CPT | Performed by: OBSTETRICS & GYNECOLOGY

## 2018-09-20 NOTE — PROGRESS NOTES
Pt reports irregular contractions about 10x/day  She notes a spot of pink with mucus yesterday, but no further bleeding  She reports +FM  Denies lof  Labor precautions and [facility] reviewed  : 1, mid consistency, midposition

## 2018-09-25 ENCOUNTER — HOSPITAL ENCOUNTER (OUTPATIENT)
Facility: HOSPITAL | Age: 23
Discharge: HOME | End: 2018-09-25
Attending: OBSTETRICS & GYNECOLOGY | Admitting: OBSTETRICS & GYNECOLOGY
Payer: COMMERCIAL

## 2018-09-25 ENCOUNTER — OFFICE VISIT (OUTPATIENT)
Dept: URGENT CARE | Facility: MEDICAL CENTER | Age: 23
End: 2018-09-25
Payer: COMMERCIAL

## 2018-09-25 VITALS
HEIGHT: 62 IN | WEIGHT: 172.13 LBS | RESPIRATION RATE: 20 BRPM | HEART RATE: 86 BPM | SYSTOLIC BLOOD PRESSURE: 121 MMHG | BODY MASS INDEX: 31.68 KG/M2 | TEMPERATURE: 98.1 F | DIASTOLIC BLOOD PRESSURE: 79 MMHG

## 2018-09-25 VITALS
BODY MASS INDEX: 31.28 KG/M2 | DIASTOLIC BLOOD PRESSURE: 63 MMHG | TEMPERATURE: 98.4 F | RESPIRATION RATE: 20 BRPM | WEIGHT: 171 LBS | SYSTOLIC BLOOD PRESSURE: 111 MMHG | HEART RATE: 98 BPM

## 2018-09-25 DIAGNOSIS — Z3A.39 39 WEEKS GESTATION OF PREGNANCY: Primary | ICD-10-CM

## 2018-09-25 PROCEDURE — S9088 SERVICES PROVIDED IN URGENT: HCPCS | Performed by: FAMILY MEDICINE

## 2018-09-25 PROCEDURE — 99213 OFFICE O/P EST LOW 20 MIN: CPT | Performed by: FAMILY MEDICINE

## 2018-09-25 PROCEDURE — 99212 OFFICE O/P EST SF 10 MIN: CPT

## 2018-09-25 NOTE — PATIENT INSTRUCTIONS
No signs of severe hypertension or symptoms found  Follow up with your Ob/gyn today to report your blood pressure recordings  Continue to closely monitor  Proceed to the emergency room if you develop any of the symptoms  I have attached the signs and symptoms of preeclampsia below:    Preeclampsia   AMBULATORY CARE:   Preeclampsia and eclampsia  are conditions that can develop during week 20 or later of your pregnancy  Preeclampsia means you have high blood pressure and protein in your urine  Preeclampsia progresses to eclampsia if you have a seizure  These conditions can create mild to life-threatening health problems for you and your unborn baby  Common signs and symptoms include the following:   · Swollen face and hands    · Weight gain of 2 or more pounds each week     · Headache    · Spotted or blurred vision     · Pain in the upper abdomen  Call 911 for any of the following:   · Seizure    · Severe abdominal pain with nausea and vomiting  Seek care immediately for the following symptoms:   · You develop a severe headache that does not go away  · You have blurred or spotted vision that does not go away  · You are bleeding from your vagina  · You have new or increased swelling in your face or hands  · You are urinating little or not at all  Contact your healthcare provider if:   · You are urinating less than usual      · You do not feel your baby's movements as often as usual     · You have questions or concerns about your condition or care  Seizure first aid:  You may have seizures with eclampsia  You may feel confused, tense, or aggressive when the seizure ends   Tell your family, friends, or coworkers to do the following if you have a seizure:  · Clear the area to help prevent injuries from falls    · Place you on your left side so you do not choke    · Give oxygen if it is available    · Call 911     · Stay with you until medical help arrives  Treatment for preeclampsia and eclampsia  may include any of the following:  · Medicines  may be given to lower your blood pressure, protect your organs, or prevent seizures  Low doses of aspirin after 12 weeks of pregnancy may be recommended if you are at high risk for preeclampsia  Aspirin may help prevent preeclampsia or problems that can happen from preeclampsia  Do not take aspirin unless directed by your healthcare provider  · Rest  as directed  Your healthcare provider may tell you to rest more often if you have mild symptoms of preeclampsia  Lie on your left side as often as you can  You may need complete bedrest if you have more severe symptoms  You may need to be in the hospital if your condition worsens  · Delivery  usually stops preeclampsia and eclampsia  Healthcare providers may deliver your baby right away if he is full-term (37 weeks or more)  You may need to deliver your baby early if you or the baby has life-threatening symptoms  Blood pressure checks: You may need to check your blood pressure each day  Your obstetrician will teach you how to check your blood pressure at home  Measure your blood pressure on the same arm and in the same position each time  Write down the date and time you take your blood pressure, and bring your notes to your prenatal visits  Kick counts: You may need to keep track of how often your baby moves or kicks over a certain amount of time  Ask your obstetrician how to do kick counts and how often to do them  Daily weight:  Weigh yourself every day before breakfast  Weight gain can be a sign of extra fluid in your body  Call your obstetrician if you have gained 2 or more pounds in a week  Rest:  Your obstetrician may tell you to rest more often if you have mild symptoms of preeclampsia  You may need total bedrest for more severe symptoms  Try to lie on your left side  Follow up with your obstetrician as directed: You will need tests 1 to 2 times a week to check your condition   Tests include blood pressure checks, urine and blood tests, and fetal monitoring  Write down your questions so you remember to ask them during your visits  © 2017 [address] [employer] Information is for End User's use only and may not be sold, redistributed or otherwise used for commercial purposes  All illustrations and images included in CareNotes® are the copyrighted property of [employer] [facility] , [employer] [place]  or [employer]  The above information is an  only  It is not intended as medical advice for individual conditions or treatments  Talk to your doctor, nurse or pharmacist before following any medical regimen to see if it is safe and effective for you

## 2018-09-25 NOTE — DISCHARGE INSTRUCTIONS
Pregnancy at 45 to 40 Weeks   [address] [place] [address]:   You are now getting close to your due date  Your due date is just an estimate of when your baby will be born  Your baby may be born before or after your due date  Your breathing may be easier if your baby has moved down into a head-down position  You may need to urinate more often because the baby may be pressing on your bladder  You may also feel more discomfort and tire easily  You may also be having trouble sleeping  DISCHARGE INSTRUCTIONS:   Seek care immediately if:   · You develop a severe headache that does not go away  · You have new or increased vision changes, such as blurred or spotted vision  · You have new or increased swelling in your face or hands  · You have vaginal spotting or bleeding  · Your water broke or you feel warm water gushing or trickling from your vagina  Contact your healthcare provider if:   · You have more than 5 contractions in 1 hour  · You notice any changes in your baby's movements  · You have abdominal cramps, pressure, or tightening  · You have a change in vaginal discharge  · You have chills or a fever  · You have vaginal itching, burning, or pain  · You have yellow, green, white, or foul-smelling vaginal discharge  · You have pain or burning when you urinate, less urine than usual, or pink or bloody urine  · You have questions or concerns about your condition or care  How to care for yourself at this stage of your pregnancy:   · Eat a variety of healthy foods  Healthy foods include fruits, vegetables, whole-grain breads, low-fat dairy foods, beans, lean meats, and fish  Drink liquids as directed  Ask how much liquid to drink each day and which liquids are best for you  Limit caffeine to less than 200 milligrams each day  Limit your intake of fish to 2 servings each week  Choose fish low in mercury such as canned light tuna, shrimp, crab, salmon, cod, or tilapia   Do not  eat fish high in mercury such as swordfish, tilefish, frankie mackerel, and shark  · Take prenatal vitamins as directed  Your need for certain vitamins and minerals, such as folic acid, increases during pregnancy  Prenatal vitamins provide some of the extra vitamins and minerals you need  Prenatal vitamins may also help to decrease the risk of certain birth defects  · Rest as needed  Put your feet up if you have swelling in your ankles and feet  · Do not smoke  If you smoke, it is never too late to quit  Smoking increases your risk of a miscarriage and other health problems during your pregnancy  Smoking can cause your baby to be born too early or weigh less at birth  Ask your healthcare provider for information if you need help quitting  · Do not drink alcohol  Alcohol passes from your body to your baby through the placenta  It can affect your baby's brain development and cause fetal alcohol syndrome (FAS)  FAS is a group of conditions that causes mental, behavior, and growth problems  · Talk to your healthcare provider before you take any medicines  Many medicines may harm your baby if you take them when you are pregnant  Do not take any medicines, vitamins, herbs, or supplements without first talking to your healthcare provider  Never use illegal or street drugs (such as marijuana or cocaine) while you are pregnant  · Talk to your healthcare provider before you travel  You may not be able to travel in an airplane after 36 weeks  He may also recommend that you avoid long road trips  Safety tips:   · Avoid hot tubs and saunas  Do not use a hot tub or sauna while you are pregnant, especially during your first trimester  Hot tubs and saunas may raise your baby's temperature and increase the risk of birth defects  · Avoid toxoplasmosis  This is an infection caused by eating raw meat or being around infected cat feces  It can cause birth defects, miscarriages, and other problems   Wash your hands after you touch raw meat  Make sure any meat is well-cooked before you eat it  Avoid raw eggs and unpasteurized milk  Use gloves or ask someone else to clean your cat's litter box while you are pregnant  · Ask your healthcare provider about travel  The most comfortable time to travel is during the second trimester  Ask your healthcare provider if you can travel after 36 weeks  You may not be able to travel in an airplane after 36 weeks  He may also recommend that you avoid long road trips  Changes that are happening with your baby: Your baby is ready to be born  At birth, your baby may weigh about 6 to 9 pounds and be about 19 to 21 inches long  Your baby may be in a head-down position  Your baby will also rest lower in your abdomen  What you need to know about prenatal care: Your healthcare provider will check your blood pressure and weight  You may also need the following:  · A urine test  may also be done to check for sugar and protein  These can be signs of gestational diabetes or infection  Protein in your urine may also be a sign of preeclampsia  Preeclampsia is a condition that can develop during week 20 or later of your pregnancy  It causes high blood pressure, and it can cause problems with your kidneys and other organs  · Your baby's heart rate  will be checked  © 2017 [facility] Information is for End User's use only and may not be sold, redistributed or otherwise used for commercial purposes  All illustrations and images included in CareNotes® are the copyrighted property of A D A M [employer] Inc  or Cesar Aviles  The above information is an  only  It is not intended as medical advice for individual conditions or treatments  Talk to your doctor, nurse or pharmacist before following any medical regimen to see if it is safe and effective for you

## 2018-09-25 NOTE — PROGRESS NOTES
L&D Triage Note - OB/GYN  Alejandro Lawler 20 y o  female MRN: [id number]  Unit/Bed#: L&D 329-01 Encounter: [id number]    Patient is seen by Dr Araceli Shea  _________________________________________________________  ASSESSMENT:  _________________________________________________________  Kevinjose Jamison is a 20 y o   at 36w4d r/o labor  _________________________________________________________  PLAN:  _________________________________________________________  1) R/o labor   - Pt examined, cervix is closed/thick/high   - Pt states she was 1cm on a prior exam   - States she felt contractions for the first time today, 2 hours ago  - Recommended hydration, belly band, tylenol, warm soak  2) Normal blood pressures in triage   - Notably 110s/80s   - Pt states she normally lives 977R systolic and noted today she was in the 120-130 range and was concerned   - Reviewed signs and symptoms of pre-eclampsia, which pt adamantly denies  3) Continue routine prenatal care   - Next appt 18  4) Discharge from [facility] triage with term labor precautions   - Case discussed with Dr Araceli Shea and Dr Mercedes Cast  Date Time Provider Nagi Cardenas   2018 11:15 AM MD Adis [address]   10/4/2018 11:15 AM Sal Daily MD [facility]     _________________________________________________________  SUBJECTIVE:  _________________________________________________________  Albert Lawler 20 y o   at 36w4d with an Estimated Date of Delivery: 10/6/18 who presents with contractions that started for the first time today, several hours ago  She has not taken anything for the pain and states the contractions come every 10min  Overall she appears comfortable and in no distress  She has no other obstetric complaints  She is having a male baby Keneth Paget      Contractions: occasional  Leakage of fluid: none  Vaginal Bleeding: none  Fetal movement: present  _________________________________________________________  OBJECTIVE:  _________________________________________________________  Vitals:    09/25/18 1800   BP: 111/63   Pulse: 98   Resp: 20   Temp: 98 4 °F (36 9 °C)       ROS:  Constitutional: Negative  Respiratory: Negative  Cardiovascular: Negative    Gastrointestinal: Negative    General Physical Exam:  General: in no apparent distress, well developed and well nourished and non-toxic  Cardiovascular: Cor RRR  Lungs: non-labored breathing, CTAB  Abdomen: abdomen is soft without significant tenderness, masses, organomegaly or guarding  Lower extremeties: nontender      SVE: closed/thick/high    Fetal monitoring:  FHT:  130 bpm/ Moderate 6 - 25 bpm / reactive accelerations, no decelerations  Doyle: no regular contractions noted     Jodi Mcclelland DO  PGY-2 OB/GYN Resident   9/25/2018 6:17 PM

## 2018-09-27 ENCOUNTER — ROUTINE PRENATAL (OUTPATIENT)
Dept: OBGYN CLINIC | Facility: CLINIC | Age: 23
End: 2018-09-27
Payer: COMMERCIAL

## 2018-09-27 VITALS — WEIGHT: 171 LBS | SYSTOLIC BLOOD PRESSURE: 108 MMHG | DIASTOLIC BLOOD PRESSURE: 64 MMHG | BODY MASS INDEX: 31.28 KG/M2

## 2018-09-27 DIAGNOSIS — Z23 NEED FOR INFLUENZA VACCINATION: ICD-10-CM

## 2018-09-27 DIAGNOSIS — O26.893 RH NEGATIVE, ANTEPARTUM, THIRD TRIMESTER: ICD-10-CM

## 2018-09-27 DIAGNOSIS — Z67.91 RH NEGATIVE, ANTEPARTUM, THIRD TRIMESTER: ICD-10-CM

## 2018-09-27 DIAGNOSIS — O99.283 HYPOTHYROIDISM AFFECTING PREGNANCY IN THIRD TRIMESTER: Primary | ICD-10-CM

## 2018-09-27 DIAGNOSIS — O99.013 ANEMIA AFFECTING PREGNANCY IN THIRD TRIMESTER: ICD-10-CM

## 2018-09-27 DIAGNOSIS — E03.9 HYPOTHYROIDISM AFFECTING PREGNANCY IN THIRD TRIMESTER: Primary | ICD-10-CM

## 2018-09-27 PROCEDURE — 90471 IMMUNIZATION ADMIN: CPT

## 2018-09-27 PROCEDURE — 99213 OFFICE O/P EST LOW 20 MIN: CPT | Performed by: OBSTETRICS & GYNECOLOGY

## 2018-09-27 PROCEDURE — 90686 IIV4 VACC NO PRSV 0.5 ML IM: CPT

## 2018-09-27 NOTE — PROGRESS NOTES
Pt reports irregular contractions, went to L&D 2 days ago and was 1 cm dilated  Ctx were every 11 minutes at that point  Pt repots +FM  Denies vb, lof  Labor precautions and fkc reviewed  Pt advised to call office if she is unsure about anything  SVE unchanged from 2 days ago  Influenza vaccination today  F/U 1 week

## 2018-10-04 ENCOUNTER — ROUTINE PRENATAL (OUTPATIENT)
Dept: OBGYN CLINIC | Facility: CLINIC | Age: 23
End: 2018-10-04
Payer: COMMERCIAL

## 2018-10-04 VITALS
OXYGEN SATURATION: 98 % | WEIGHT: 174 LBS | SYSTOLIC BLOOD PRESSURE: 120 MMHG | DIASTOLIC BLOOD PRESSURE: 80 MMHG | HEART RATE: 110 BPM | BODY MASS INDEX: 31.83 KG/M2

## 2018-10-04 DIAGNOSIS — O99.283 HYPOTHYROIDISM AFFECTING PREGNANCY IN THIRD TRIMESTER: ICD-10-CM

## 2018-10-04 DIAGNOSIS — O99.013 ANEMIA AFFECTING PREGNANCY IN THIRD TRIMESTER: ICD-10-CM

## 2018-10-04 DIAGNOSIS — E03.9 HYPOTHYROIDISM AFFECTING PREGNANCY IN THIRD TRIMESTER: ICD-10-CM

## 2018-10-04 DIAGNOSIS — Z67.91 RH NEGATIVE, ANTEPARTUM, THIRD TRIMESTER: Primary | ICD-10-CM

## 2018-10-04 DIAGNOSIS — O26.893 RH NEGATIVE, ANTEPARTUM, THIRD TRIMESTER: Primary | ICD-10-CM

## 2018-10-04 PROCEDURE — 99213 OFFICE O/P EST LOW 20 MIN: CPT | Performed by: OBSTETRICS & GYNECOLOGY

## 2018-10-04 NOTE — PROGRESS NOTES
Pt reports contractions, sometimes every 5 minutes and sometimes every 1/2 hour  She reports +FM  Denies vb, lof  Labor precautions and c reviewed  SVE: 1/60/-1, soft, midposition  Pt desires to schedule IOL for next week   Scheduled for 10/10/18 at 20:00 for ripening with cytotec

## 2018-10-08 ENCOUNTER — TELEPHONE (OUTPATIENT)
Dept: OBGYN CLINIC | Facility: CLINIC | Age: 23
End: 2018-10-08

## 2018-10-08 ENCOUNTER — HOSPITAL ENCOUNTER (OUTPATIENT)
Facility: HOSPITAL | Age: 23
Discharge: HOME | End: 2018-10-08
Attending: OBSTETRICS & GYNECOLOGY | Admitting: OBSTETRICS & GYNECOLOGY
Payer: COMMERCIAL

## 2018-10-08 ENCOUNTER — HOSPITAL ENCOUNTER (EMERGENCY)
Facility: HOSPITAL | Age: 23
End: 2018-10-08
Attending: EMERGENCY MEDICINE
Payer: COMMERCIAL

## 2018-10-08 VITALS
TEMPERATURE: 98.4 F | DIASTOLIC BLOOD PRESSURE: 70 MMHG | WEIGHT: 172 LBS | HEIGHT: 62 IN | RESPIRATION RATE: 20 BRPM | HEART RATE: 86 BPM | BODY MASS INDEX: 31.65 KG/M2 | OXYGEN SATURATION: 98 % | SYSTOLIC BLOOD PRESSURE: 132 MMHG

## 2018-10-08 VITALS
RESPIRATION RATE: 18 BRPM | DIASTOLIC BLOOD PRESSURE: 61 MMHG | HEART RATE: 83 BPM | SYSTOLIC BLOOD PRESSURE: 114 MMHG | TEMPERATURE: 98.2 F

## 2018-10-08 DIAGNOSIS — Z34.90 TERM PREGNANCY: Primary | ICD-10-CM

## 2018-10-08 PROBLEM — Z3A.40 40 WEEKS GESTATION OF PREGNANCY: Status: ACTIVE | Noted: 2018-10-08

## 2018-10-08 LAB
A1 MICROGLOB PLACENTAL VAG QL: NEGATIVE
ABO GROUP BLD: NORMAL
BASOPHILS # BLD AUTO: 0.02 THOUSANDS/ΜL (ref 0–0.1)
BASOPHILS NFR BLD AUTO: 0 % (ref 0–1)
BILIRUB UR QL STRIP: NEGATIVE
BLD GP AB SCN SERPL QL: NEGATIVE
CLARITY UR: CLEAR
COLOR UR: NORMAL
EOSINOPHIL # BLD AUTO: 0.03 THOUSAND/ΜL (ref 0–0.61)
EOSINOPHIL NFR BLD AUTO: 1 % (ref 0–6)
ERYTHROCYTE [DISTWIDTH] IN BLOOD BY AUTOMATED COUNT: 16.1 % (ref 11.6–15.1)
GLUCOSE UR STRIP-MCNC: NEGATIVE MG/DL
HCT VFR BLD AUTO: 36.3 % (ref 34.8–46.1)
HGB BLD-MCNC: 11.9 G/DL (ref 11.5–15.4)
HGB UR QL STRIP.AUTO: NEGATIVE
IMM GRANULOCYTES # BLD AUTO: 0.02 THOUSAND/UL (ref 0–0.2)
IMM GRANULOCYTES NFR BLD AUTO: 0 % (ref 0–2)
KETONES UR STRIP-MCNC: NEGATIVE MG/DL
LEUKOCYTE ESTERASE UR QL STRIP: NEGATIVE
LYMPHOCYTES # BLD AUTO: 1.94 THOUSANDS/ΜL (ref 0.6–4.47)
LYMPHOCYTES NFR BLD AUTO: 30 % (ref 14–44)
MCH RBC QN AUTO: 28.1 PG (ref 26.8–34.3)
MCHC RBC AUTO-ENTMCNC: 32.8 G/DL (ref 31.4–37.4)
MCV RBC AUTO: 86 FL (ref 82–98)
MONOCYTES # BLD AUTO: 0.48 THOUSAND/ΜL (ref 0.17–1.22)
MONOCYTES NFR BLD AUTO: 7 % (ref 4–12)
NEUTROPHILS # BLD AUTO: 4.09 THOUSANDS/ΜL (ref 1.85–7.62)
NEUTS SEG NFR BLD AUTO: 62 % (ref 43–75)
NITRITE UR QL STRIP: NEGATIVE
NRBC BLD AUTO-RTO: 0 /100 WBCS
PH UR STRIP.AUTO: 6.5 [PH] (ref 4.5–8)
PLATELET # BLD AUTO: 172 THOUSANDS/UL (ref 149–390)
PMV BLD AUTO: 11 FL (ref 8.9–12.7)
PROT UR STRIP-MCNC: NEGATIVE MG/DL
RBC # BLD AUTO: 4.23 MILLION/UL (ref 3.81–5.12)
RH BLD: NEGATIVE
SP GR UR STRIP.AUTO: <=1.005 (ref 1–1.03)
SPECIMEN EXPIRATION DATE: NORMAL
UROBILINOGEN UR QL STRIP.AUTO: 0.2 E.U./DL
WBC # BLD AUTO: 6.58 THOUSAND/UL (ref 4.31–10.16)

## 2018-10-08 PROCEDURE — 86900 BLOOD TYPING SEROLOGIC ABO: CPT | Performed by: EMERGENCY MEDICINE

## 2018-10-08 PROCEDURE — 99285 EMERGENCY DEPT VISIT HI MDM: CPT

## 2018-10-08 PROCEDURE — 85025 COMPLETE CBC W/AUTO DIFF WBC: CPT | Performed by: EMERGENCY MEDICINE

## 2018-10-08 PROCEDURE — 36415 COLL VENOUS BLD VENIPUNCTURE: CPT | Performed by: EMERGENCY MEDICINE

## 2018-10-08 PROCEDURE — 87086 URINE CULTURE/COLONY COUNT: CPT | Performed by: EMERGENCY MEDICINE

## 2018-10-08 PROCEDURE — 99212 OFFICE O/P EST SF 10 MIN: CPT

## 2018-10-08 PROCEDURE — 81003 URINALYSIS AUTO W/O SCOPE: CPT | Performed by: EMERGENCY MEDICINE

## 2018-10-08 PROCEDURE — 86850 RBC ANTIBODY SCREEN: CPT | Performed by: EMERGENCY MEDICINE

## 2018-10-08 PROCEDURE — 84112 EVAL AMNIOTIC FLUID PROTEIN: CPT | Performed by: OBSTETRICS & GYNECOLOGY

## 2018-10-08 PROCEDURE — 86901 BLOOD TYPING SEROLOGIC RH(D): CPT | Performed by: EMERGENCY MEDICINE

## 2018-10-08 NOTE — TELEPHONE ENCOUNTER
Patient called stating she was leaking a "good amount" of fluid  She is 40 weeks and 2 days pregnant  She was advised to go Labor and delivery to be checked  Pt lives near [place] [address] and states she can only go there right now

## 2018-10-08 NOTE — DISCHARGE INSTRUCTIONS
Pregnancy at 44 to 40 Weeks   [address] [place], [place] [address] :   You are now getting close to your due date  Your due date is just an estimate of when your baby will be born  Your baby may be born before or after your due date  Your breathing may be easier if your baby has moved down into a head-down position  You may need to urinate more often because the baby may be pressing on your bladder  You may also feel more discomfort and tire easily  You may also be having trouble sleeping  DISCHARGE INSTRUCTIONS:   Seek care immediately if:   · You develop a severe headache that does not go away  · You have new or increased vision changes, such as blurred or spotted vision  · You have new or increased swelling in your face or hands  · You have vaginal spotting or bleeding  · Your water broke or you feel warm water gushing or trickling from your vagina  Contact your healthcare provider if:   · You have more than 5 contractions in 1 hour  · You notice any changes in your baby's movements  · You have abdominal cramps, pressure, or tightening  · You have a change in vaginal discharge  · You have chills or a fever  · You have vaginal itching, burning, or pain  · You have yellow, green, white, or foul-smelling vaginal discharge  · You have pain or burning when you urinate, less urine than usual, or pink or bloody urine  · You have questions or concerns about your condition or care  How to care for yourself at this stage of your pregnancy:   · Eat a variety of healthy foods  Healthy foods include fruits, vegetables, whole-grain breads, low-fat dairy foods, beans, lean meats, and fish  Drink liquids as directed  Ask how much liquid to drink each day and which liquids are best for you  Limit caffeine to less than 200 milligrams each day  Limit your intake of fish to 2 servings each week  Choose fish low in mercury such as canned light tuna, shrimp, crab, salmon, cod, or tilapia   Do not  eat fish high in mercury such as swordfish, tilefish, frankie mackerel, and shark  · Take prenatal vitamins as directed  Your need for certain vitamins and minerals, such as folic acid, increases during pregnancy  Prenatal vitamins provide some of the extra vitamins and minerals you need  Prenatal vitamins may also help to decrease the risk of certain birth defects  · Rest as needed  Put your feet up if you have swelling in your ankles and feet  · Do not smoke  If you smoke, it is never too late to quit  Smoking increases your risk of a miscarriage and other health problems during your pregnancy  Smoking can cause your baby to be born too early or weigh less at birth  Ask your healthcare provider for information if you need help quitting  · Do not drink alcohol  Alcohol passes from your body to your baby through the placenta  It can affect your baby's brain development and cause fetal alcohol syndrome (FAS)  FAS is a group of conditions that causes mental, behavior, and growth problems  · Talk to your healthcare provider before you take any medicines  Many medicines may harm your baby if you take them when you are pregnant  Do not take any medicines, vitamins, herbs, or supplements without first talking to your healthcare provider  Never use illegal or street drugs (such as marijuana or cocaine) while you are pregnant  · Talk to your healthcare provider before you travel  You may not be able to travel in an airplane after 36 weeks  He may also recommend that you avoid long road trips  Safety tips:   · Avoid hot tubs and saunas  Do not use a hot tub or sauna while you are pregnant, especially during your first trimester  Hot tubs and saunas may raise your baby's temperature and increase the risk of birth defects  · Avoid toxoplasmosis  This is an infection caused by eating raw meat or being around infected cat feces  It can cause birth defects, miscarriages, and other problems   Wash your hands after you touch raw meat  Make sure any meat is well-cooked before you eat it  Avoid raw eggs and unpasteurized milk  Use gloves or ask someone else to clean your cat's litter box while you are pregnant  · Ask your healthcare provider about travel  The most comfortable time to travel is during the second trimester  Ask your healthcare provider if you can travel after 36 weeks  You may not be able to travel in an airplane after 36 weeks  He may also recommend that you avoid long road trips  Changes that are happening with your baby: Your baby is ready to be born  At birth, your baby may weigh about 6 to 9 pounds and be about 19 to 21 inches long  Your baby may be in a head-down position  Your baby will also rest lower in your abdomen  What you need to know about prenatal care: Your healthcare provider will check your blood pressure and weight  You may also need the following:  · A urine test  may also be done to check for sugar and protein  These can be signs of gestational diabetes or infection  Protein in your urine may also be a sign of preeclampsia  Preeclampsia is a condition that can develop during week 20 or later of your pregnancy  It causes high blood pressure, and it can cause problems with your kidneys and other organs  · Your baby's heart rate  will be checked  © 2017 [address] Information is for End User's use only and may not be sold, redistributed or otherwise used for commercial purposes  All illustrations and images included in CareNotes® are the copyrighted property of A D A M , Inc  or Cesar Aviles  The above information is an  only  It is not intended as medical advice for individual conditions or treatments  Talk to your doctor, nurse or pharmacist before following any medical regimen to see if it is safe and effective for you    Preeclampsia   WHAT YOU NEED TO KNOW:   Preeclampsia is a condition that can develop during week 20 or later of your pregnancy  Preeclampsia means you have high blood pressure and may have protein in your urine  Preeclampsia can cause mild to life-threatening health problems for you and your unborn baby  DISCHARGE INSTRUCTIONS:   Call 911 for any of the following:   · You have a seizure  · You have severe abdominal pain with nausea and vomiting  Seek care immediately if:   · You develop a severe headache that does not go away  · You have blurred or spotted vision that does not go away  · You are bleeding from your vagina  · You have new or increased swelling in your face or hands  · You are urinating little or not at all  Contact your obstetrician if:   · You are urinating less than usual      · You do not feel your baby's movement as often as usual      · You have questions or concerns about your condition or care  Medicines:   · Blood pressure medicine  helps lower your blood pressure and protects your heart, lungs, brain, and kidneys  Take your blood pressure medicine exactly as directed  · Low doses of aspirin  may be recommended after 12 weeks of pregnancy if you are at high risk for preeclampsia  Aspirin may help prevent preeclampsia or problems that can happen from preeclampsia  Do not take aspirin unless directed by your healthcare provider  · Take your medicine as directed  Contact your healthcare provider if you think your medicine is not helping or if you have side effects  Tell him or her if you are allergic to any medicine  Keep a list of the medicines, vitamins, and herbs you take  Include the amounts, and when and why you take them  Bring the list or the pill bottles to follow-up visits  Carry your medicine list with you in case of an emergency  Follow up with your obstetrician as directed: You will need tests 1 to 2 times a week to check your condition  Tests include blood pressure checks, urine and blood tests, and fetal monitoring   Write down your questions so you remember to ask them during your visits  Steps to take if you have a seizure: You may have seizures if you develop eclampsia  You may feel confused, tense, or aggressive when the seizure ends  Tell your family, friends, or coworkers to do the following if you have a seizure:  · Clear the area to help prevent injuries from falls    · Place you on your left side so you do not choke    · Give oxygen if it is available    · Call 911     · Stay with you until medical help arrives  Blood pressure checks: You may need to check your blood pressure each day  Your obstetrician will teach you how to check your blood pressure at home  Measure your blood pressure on the same arm and in the same position each time  Write down the date and time you take your blood pressure, and bring your notes to your prenatal visits  Kick counts: You may need to keep track of how often your baby moves or kicks over a certain amount of time  Ask your obstetrician how to do kick counts and how often to do them  Daily weight:  Weigh yourself every day before breakfast  Weight gain can be a sign of extra fluid in your body  Call your obstetrician if you have gained 2 or more pounds in a week  Rest:  Your obstetrician may tell you to rest more often if you have mild symptoms of preeclampsia  You may need total bedrest for more severe symptoms  Try to lie on your left side  © 2017 [address] Information is for End User's use only and may not be sold, redistributed or otherwise used for commercial purposes  All illustrations and images included in CareNotes® are the copyrighted property of A D A M , Inc  or Cesar Aviles  The above information is an  only  It is not intended as medical advice for individual conditions or treatments  Talk to your doctor, nurse or pharmacist before following any medical regimen to see if it is safe and effective for you

## 2018-10-08 NOTE — PROGRESS NOTES
Triage Note - OB  Alejandro Lawler 20 y o  female MRN: [id number]  Unit/Bed#: L&D 326- Encounter: [id number]    OB TRIAGE NOTE  Alejandro Lawler  [id number]  10/8/2018  5:07 PM  L&D 326/L&D 326    ASSESS:  20 y o   40w2d for rule out labor, rule out rupture of membranes    PLAN  #1  Rule out labor: negative  · SVE /60/-1, same as cervical check on 10/4/18  · Char every 3-5 minutes on tocometry, looks visibly comfortable    #2  Rule out rupture: negative  · No pooling +/- valsalva  · Ferning neg, nitrazine neg  · DARIUS 13 4 (18) --> 9 28cm today, VTX  · Amnisure negative    #3  Discharge instructions  · Patient instructed to call if experiencing worsening contractions, vaginal bleeding, loss of fluid or decreased fetal movement  · Will follow up with OBGYN  D/w Dr Georgianna Goodell  ______________    SUBJECTIVE    STEW: Estimated Date of Delivery: 10/6/18    HPI Chronology:  20 y o   40w2d presents with complaint of rupture of membranes at 1200 today, followed by irregular contractions  She is unsure how far apart her contractions are  She states that since noon, she has been feeling constant leakage of clear nonmalodorous fluid  Patient denies any other complaints  Her pregnancy is complicated by anemia, Rh negative status, hypothyroidism  Contractions: Present, every 3-5 minutes  Leakage: Present  Bleeding: Absent  Fetal Movement: Present  Pelvic pain: Absent    Vitals:   /61 (BP Location: Left arm)   Pulse 83   Temp 98 2 °F (36 8 °C) (Oral)   Resp 18   LMP 2017 (Exact Date)   There is no height or weight on file to calculate BMI  Review of Systems   Constitutional: Negative  Respiratory: Negative  Cardiovascular: Negative  Gastrointestinal: Negative  Genitourinary: Negative  Musculoskeletal: Negative  Physical Exam   Constitutional: She is oriented to person, place, and time  She appears well-developed and well-nourished     Cardiovascular: Normal rate, regular rhythm and normal heart sounds  Pulmonary/Chest: Effort normal and breath sounds normal    Abdominal: Soft  Bowel sounds are normal    Neurological: She is alert and oriented to person, place, and time  Vitals reviewed       FHT:  Baseline: 140  Variability: moderate   Accelerations: 15x15, episodic   Decelerations: absent     TOCO:   Contractions every 3-5 minutes    SSE:  Wet mount/KOH: clue cells neg, trichomonads neg, hyphae neg  Minimal amount of physiologic vaginal discharge noted  No active bleeding noted   Nitrazine negative  Ferning negative  No pooling with and without valsalva     SVE:  1/60/-2    TAUS:  DARIUS 9 28cm (9/14/18 13 4cm)  Vertex presentation    Labs:   Recent Results (from the past 24 hour(s))   Type and screen    Collection Time: 10/08/18  1:53 PM   Result Value Ref Range    ABO Grouping A     Rh Factor Negative     Antibody Screen Negative     Specimen Expiration Date [id number]    CBC and differential    Collection Time: 10/08/18  1:53 PM   Result Value Ref Range    WBC 6 58 4 31 - 10 16 Thousand/uL    RBC 4 23 3 81 - 5 12 Million/uL    Hemoglobin 11 9 11 5 - 15 4 g/dL    Hematocrit 36 3 34 8 - 46 1 %    MCV 86 82 - 98 fL    MCH 28 1 26 8 - 34 3 pg    MCHC 32 8 31 4 - 37 4 g/dL    RDW 16 1 (H) 11 6 - 15 1 %    MPV 11 0 8 9 - 12 7 fL    Platelets 659 962 - 788 Thousands/uL    nRBC 0 /100 WBCs    Neutrophils Relative 62 43 - 75 %    Immat GRANS % 0 0 - 2 %    Lymphocytes Relative 30 14 - 44 %    Monocytes Relative 7 4 - 12 %    Eosinophils Relative 1 0 - 6 %    Basophils Relative 0 0 - 1 %    Neutrophils Absolute 4 09 1 85 - 7 62 Thousands/µL    Immature Grans Absolute 0 02 0 00 - 0 20 Thousand/uL    Lymphocytes Absolute 1 94 0 60 - 4 47 Thousands/µL    Monocytes Absolute 0 48 0 17 - 1 22 Thousand/µL    Eosinophils Absolute 0 03 0 00 - 0 61 Thousand/µL    Basophils Absolute 0 02 0 00 - 0 10 Thousands/µL   UA w Reflex to Microscopic w Reflex to Culture    Collection Time: 10/08/18 1:54 PM   Result Value Ref Range    Color, UA Light Yellow     Clarity, UA Clear     Specific Gravity, UA <=1 005 1 003 - 1 030    pH, UA 6 5 4 5 - 8 0    Leukocytes, UA Negative Negative    Nitrite, UA Negative Negative    Protein, UA Negative Negative mg/dl    Glucose, UA Negative Negative mg/dl    Ketones, UA Negative Negative mg/dl    Urobilinogen, UA 0 2 0 2, 1 0 E U /dl E U /dl    Bilirubin, UA Negative Negative    Blood, UA Negative Negative    URINE COMMENT         Lab, Imaging and other studies: I have personally reviewed pertinent reports          Benita Patel MD  10/8/2018  5:07 PM

## 2018-10-08 NOTE — ED PROVIDER NOTES
History  Chief Complaint   Patient presents with    Contractions     pt is 40wks pregnant and is c/o contracttions and " I feel like my water is leaking"      20 y o  F, , 41 weeks gestation, presents w contractions approx 5 min apart by patient sensation, after feeling of fluid leaking and concern for water break  She states that one hour PTA she felt a gush of water and was concern for water break  On arrival she is having conctractions every 2 minutes by tocometer  Baby is head down by bedside US  Tocometer is reading fetal heart tones at 152  Prior to Admission Medications   Prescriptions Last Dose Informant Patient Reported? Taking? Prenatal Vit-Fe Fumarate-FA (PRENATAL VITAMIN) 27-0 8 MG TABS   Yes No   Sig: Take by mouth   levothyroxine 75 mcg tablet  Self Yes No   Sig: Take 75 mcg by mouth daily   nystatin (MYCOSTATIN) powder   No No   Sig: Apply topically 2 (two) times a day      Facility-Administered Medications: None       Past Medical History:   Diagnosis Date    Hashimoto's disease     Varicella        Past Surgical History:   Procedure Laterality Date    MYRINGOTOMY      NOSE SURGERY         Family History   Problem Relation Age of Onset    Hashimoto's thyroiditis Mother     Chronic bronchitis Father      I have reviewed and agree with the history as documented  Social History   Substance Use Topics    Smoking status: Never Smoker    Smokeless tobacco: Never Used    Alcohol use No        Review of Systems   Constitutional: Negative for chills, fatigue and fever  Respiratory: Negative for cough, chest tightness and shortness of breath  Cardiovascular: Negative for chest pain and palpitations  Gastrointestinal: Negative for abdominal pain, constipation, diarrhea, nausea and vomiting  Genitourinary: Negative for dysuria, flank pain, vaginal discharge and vaginal pain  Sensation of fluid leaking    Contractions q 5 min   Musculoskeletal: Negative for back pain and neck pain  Skin: Negative for color change and rash  Allergic/Immunologic: Negative for immunocompromised state  Neurological: Negative for dizziness, syncope and headaches  Physical Exam  Physical Exam   Constitutional: She is oriented to person, place, and time  She appears well-developed and well-nourished  No distress  HENT:   Head: Normocephalic and atraumatic  Mouth/Throat: Oropharynx is clear and moist    Eyes: Conjunctivae and EOM are normal    Neck: Normal range of motion  Pulmonary/Chest: Effort normal  No respiratory distress  Genitourinary: Vagina normal  No vaginal discharge found  Genitourinary Comments: approx 4 cm dilated  Bedside US indicates baby is in head-down position  [place]-meter reading contractions q2min, fetal heart rate of 152    Musculoskeletal: Normal range of motion  She exhibits no edema  Neurological: She is alert and oriented to person, place, and time  No cranial nerve deficit  Skin: Skin is warm and dry  She is not diaphoretic  No pallor  Psychiatric: She has a normal mood and affect  Her behavior is normal    Vitals reviewed        Vital Signs  ED Triage Vitals [10/08/18 1305]   Temperature Pulse Respirations Blood Pressure SpO2   98 4 °F (36 9 °C) 86 20 132/70 98 %      Temp Source Heart Rate Source Patient Position - Orthostatic VS BP Location FiO2 (%)   Oral Monitor Sitting Left arm --      Pain Score       No Pain           Vitals:    10/08/18 1305   BP: 132/70   Pulse: 86   Patient Position - Orthostatic VS: Sitting       Visual Acuity      ED Medications  Medications - No data to display    Diagnostic Studies  Results Reviewed     Procedure Component Value Units Date/Time    UA w Reflex to Microscopic w Reflex to Culture [[id number]] Collected:  10/08/18 1354    Lab Status:  Final result Specimen:  Urine from Urine, Clean Catch Updated:  10/08/18 1424     Color, UA Light Yellow     Clarity, UA Clear     Specific Gravity, UA <=1 005 pH, UA 6 5     Leukocytes, UA Negative     Nitrite, UA Negative     Protein, UA Negative mg/dl      Glucose, UA Negative mg/dl      Ketones, UA Negative mg/dl      Urobilinogen, UA 0 2 E U /dl      Bilirubin, UA Negative     Blood, UA Negative     URINE COMMENT --    Urine culture [[id number]] Collected:  10/08/18 1353    Lab Status: In process Specimen:  Urine from Urine, Clean Catch Updated:  10/08/18 1424    CBC and differential [[id number]]  (Abnormal) Collected:  10/08/18 1353    Lab Status:  Final result Specimen:  Blood from Line, Venous Updated:  10/08/18 1420     WBC 6 58 Thousand/uL      RBC 4 23 Million/uL      Hemoglobin 11 9 g/dL      Hematocrit 36 3 %      MCV 86 fL      MCH 28 1 pg      MCHC 32 8 g/dL      RDW 16 1 (H) %      MPV 11 0 fL      Platelets 368 Thousands/uL      nRBC 0 /100 WBCs      Neutrophils Relative 62 %      Immat GRANS % 0 %      Lymphocytes Relative 30 %      Monocytes Relative 7 %      Eosinophils Relative 1 %      Basophils Relative 0 %      Neutrophils Absolute 4 09 Thousands/µL      Immature Grans Absolute 0 02 Thousand/uL      Lymphocytes Absolute 1 94 Thousands/µL      Monocytes Absolute 0 48 Thousand/µL      Eosinophils Absolute 0 03 Thousand/µL      Basophils Absolute 0 02 Thousands/µL                  No orders to display              Procedures  Procedures       Phone Contacts  ED Phone Contact    ED Course  ED Course as of Oct 08 Saroj Edwards 59 Oct 08, 2018   1321 Call placed to PACS to send patient to [facility] for delivery    1349 Patient remained stable  Fetal heart rate is 148  Appropriate variations of contractions  [id number] Ambulance is here to transport patient to [facility]  Patient is still stable, fetal heart rate continues to be high 140s to low 150s  MDM  Number of Diagnoses or Management Options  Diagnosis management comments: Patient is aware that she will require transport down to [facility]   Patient seems stable for transport at this time  Accepted by Dr Andres Bobo  Amount and/or Complexity of Data Reviewed  Clinical lab tests: ordered      CritCare Time    Disposition  Final diagnoses:   Term pregnancy     Time reflects when diagnosis was documented in both MDM as applicable and the Disposition within this note     Time User Action Codes Description Comment    10/8/2018  1:42 PM Hemanth Alva Add [Z34 80] Term pregnancy       ED Disposition     ED Disposition Condition Comment    Transfer to Another Facility  Alejandro Lawler should be transferred out to [facility] under the service of Andres Bobo MD Documentation      Most Recent Value   Patient Condition  The patient has been stabilized such that within reasonable medical probability, no material deterioration of the patient condition or the condition of the unborn child(vj) is likely to result from the transfer   Reason for Transfer  Level of Care needed not available at this facility   Benefits of Transfer  Specialized equipment and/or services available at the receiving facility (Include comment)________________________   Risks of Transfer  Potential for delay in receiving treatment, Potential deterioration of medical condition, Increased discomfort during transfer, Loss of IV, Possible worsening of condition or death during transfer   Accepting Physician  Brittney  Name, [address] [place]    (Name & Tel number)  Esmer Coffey   Sending MD Alva   Provider Certification  General risk, such as traffic hazards, adverse weather conditions, rough terrain or turbulence, possible failure of equipment (including vehicle or aircraft), or consequences of actions of persons outside the control of the transport personnel      RN Documentation      [facility] [address] [facility] Name, [address] [place]    (Name & Tel number)  Esmer Coffey      Follow-up Information     Follow up With Specialties Details Why Contact Info Additional Information    [address] [facility] Emergency Department Emergency Medicine   [address] [facility] [address]  [phone] [address] [facility], [address] [place], [place], [address]          Discharge Medication List as of 10/8/2018  2:37 PM      CONTINUE these medications which have NOT CHANGED    Details   levothyroxine 75 mcg tablet Take 75 mcg by mouth daily, Historical Med      nystatin (MYCOSTATIN) powder Apply topically 2 (two) times a day, Starting Thu 8/16/2018, Normal      Prenatal Vit-Fe Fumarate-FA (PRENATAL VITAMIN) 27-0 8 MG TABS Take by mouth, Historical Med           No discharge procedures on file      ED Provider  Electronically Signed by           Jad Last DO  10/08/18 1500

## 2018-10-08 NOTE — EMTALA/ACUTE CARE TRANSFER
[address] [address]  Dept: [phone]      EMTALA TRANSFER CONSENT    NAME Alejandro Lawler                                         1995                              MRN [id number]    I have been informed of my rights regarding examination, treatment, and transfer   by Dr Daniel Knox, *    Benefits: Specialized equipment and/or services available at the receiving facility (Include comment)________________________    Risks: Potential for delay in receiving treatment, Potential deterioration of medical condition, Increased discomfort during transfer, Loss of IV, Possible worsening of condition or death during transfer      Consent for Transfer:  I acknowledge that my medical condition has been evaluated and explained to me by the emergency department physician or other qualified medical person and/or my attending physician, who has recommended that I be transferred to the service of  Accepting Physician: Pat Sykes at [facility] Name, [address] : [facility] [place]  The above potential benefits of such transfer, the potential risks associated with such transfer, and the probable risks of not being transferred have been explained to me, and I fully understand them  The doctor has explained that, in my case, the benefits of transfer outweigh the risks  I agree to be transferred  I authorize the performance of emergency medical procedures and treatments upon me in both transit and upon arrival at the receiving facility  Additionally, I authorize the release of any and all medical records to the receiving facility and request they be transported with me, if possible  I understand that the safest mode of transportation during a medical emergency is an ambulance and that the Hospital advocates the use of this mode of transport   Risks of traveling to the receiving facility by car, including absence of medical control, life sustaining equipment, such as oxygen, and medical personnel has been explained to me and I fully understand them  (ROSALES CORRECT BOX BELOW)  [  ]  I consent to the stated transfer and to be transported by ambulance/helicopter  [  ]  I consent to the stated transfer, but refuse transportation by ambulance and accept full responsibility for my transportation by car  I understand the risks of non-ambulance transfers and I exonerate the Hospital and its staff from any deterioration in my condition that results from this refusal     X___________________________________________    DATE  10/08/18  TIME________  Signature of patient or legally responsible individual signing on patient behalf           RELATIONSHIP TO PATIENT_________________________          Provider Certification    NAME Alejandro Lawler                                        [facility] 1995                              MRN [id number]    A medical screening exam was performed on the above named patient  Based on the examination:    Condition Necessitating Transfer The encounter diagnosis was Term pregnancy      Patient Condition: The patient has been stabilized such that within reasonable medical probability, no material deterioration of the patient condition or the condition of the unborn child(vj) is likely to result from the transfer    Reason for Transfer: Level of Care needed not available at this facility    Transfer Requirements: [address]   · Space available and qualified personnel available for treatment as acknowledged by Natalya Frias  · Agreed to accept transfer and to provide appropriate medical treatment as acknowledged by       Graciela Martinez  · Appropriate medical records of the examination and treatment of the patient are provided at the time of transfer   [address] _______  · Transfer will be performed by qualified personnel from    and appropriate transfer equipment as required, including the use of necessary and appropriate life support measures  Provider Certification: I have examined the patient and explained the following risks and benefits of being transferred/refusing transfer to the patient/family:  General risk, such as traffic hazards, adverse weather conditions, rough terrain or turbulence, possible failure of equipment (including vehicle or aircraft), or consequences of actions of persons outside the control of the transport personnel      Based on these reasonable risks and benefits to the patient and/or the unborn child(vj), and based upon the information available at the time of the patients examination, I certify that the medical benefits reasonably to be expected from the provision of appropriate medical treatments at another medical facility outweigh the increasing risks, if any, to the individuals medical condition, and in the case of labor to the unborn child, from effecting the transfer      X____________________________________________ DATE 10/08/18        TIME_______      ORIGINAL - SEND TO MEDICAL RECORDS   COPY - SEND WITH PATIENT DURING TRANSFER

## 2018-10-09 LAB — BACTERIA UR CULT: NORMAL

## 2018-10-09 NOTE — TELEPHONE ENCOUNTER
Pt was advised at the beginning pregnancy that we deliver at [facility] and she has presented there many times before  If she goes to the [place] [address] I do not even believe they have a labor and delivery  I will review this with her again at her next visit   Thanks

## 2018-10-10 ENCOUNTER — HOSPITAL ENCOUNTER (INPATIENT)
Facility: HOSPITAL | Age: 23
LOS: 3 days | Discharge: HOME | DRG: 560 | End: 2018-10-13
Attending: OBSTETRICS & GYNECOLOGY | Admitting: OBSTETRICS & GYNECOLOGY
Payer: COMMERCIAL

## 2018-10-10 ENCOUNTER — ANESTHESIA EVENT (INPATIENT)
Dept: LABOR AND DELIVERY | Facility: HOSPITAL | Age: 23
DRG: 560 | End: 2018-10-10
Payer: COMMERCIAL

## 2018-10-10 ENCOUNTER — HOSPITAL ENCOUNTER (OUTPATIENT)
Dept: LABOR AND DELIVERY | Facility: HOSPITAL | Age: 23
Discharge: HOME | DRG: 560 | End: 2018-10-10
Payer: COMMERCIAL

## 2018-10-10 DIAGNOSIS — Z3A.40 40 WEEKS GESTATION OF PREGNANCY: Primary | ICD-10-CM

## 2018-10-10 LAB
ABO GROUP BLD: NORMAL
BASOPHILS # BLD AUTO: 0.01 THOUSANDS/ΜL (ref 0–0.1)
BASOPHILS NFR BLD AUTO: 0 % (ref 0–1)
BLD GP AB SCN SERPL QL: NEGATIVE
EOSINOPHIL # BLD AUTO: 0.02 THOUSAND/ΜL (ref 0–0.61)
EOSINOPHIL NFR BLD AUTO: 0 % (ref 0–6)
ERYTHROCYTE [DISTWIDTH] IN BLOOD BY AUTOMATED COUNT: 16.2 % (ref 11.6–15.1)
HCT VFR BLD AUTO: 39.2 % (ref 34.8–46.1)
HGB BLD-MCNC: 12.4 G/DL (ref 11.5–15.4)
IMM GRANULOCYTES # BLD AUTO: 0.02 THOUSAND/UL (ref 0–0.2)
IMM GRANULOCYTES NFR BLD AUTO: 0 % (ref 0–2)
LYMPHOCYTES # BLD AUTO: 2.33 THOUSANDS/ΜL (ref 0.6–4.47)
LYMPHOCYTES NFR BLD AUTO: 30 % (ref 14–44)
MCH RBC QN AUTO: 27.6 PG (ref 26.8–34.3)
MCHC RBC AUTO-ENTMCNC: 31.6 G/DL (ref 31.4–37.4)
MCV RBC AUTO: 87 FL (ref 82–98)
MONOCYTES # BLD AUTO: 0.43 THOUSAND/ΜL (ref 0.17–1.22)
MONOCYTES NFR BLD AUTO: 6 % (ref 4–12)
NEUTROPHILS # BLD AUTO: 5 THOUSANDS/ΜL (ref 1.85–7.62)
NEUTS SEG NFR BLD AUTO: 64 % (ref 43–75)
NRBC BLD AUTO-RTO: 0 /100 WBCS
PLATELET # BLD AUTO: 176 THOUSANDS/UL (ref 149–390)
PMV BLD AUTO: 11 FL (ref 8.9–12.7)
RBC # BLD AUTO: 4.5 MILLION/UL (ref 3.81–5.12)
RH BLD: NEGATIVE
SPECIMEN EXPIRATION DATE: NORMAL
WBC # BLD AUTO: 7.81 THOUSAND/UL (ref 4.31–10.16)

## 2018-10-10 PROCEDURE — 86901 BLOOD TYPING SEROLOGIC RH(D): CPT | Performed by: OBSTETRICS & GYNECOLOGY

## 2018-10-10 PROCEDURE — 86900 BLOOD TYPING SEROLOGIC ABO: CPT | Performed by: OBSTETRICS & GYNECOLOGY

## 2018-10-10 PROCEDURE — 85025 COMPLETE CBC W/AUTO DIFF WBC: CPT | Performed by: OBSTETRICS & GYNECOLOGY

## 2018-10-10 PROCEDURE — 86850 RBC ANTIBODY SCREEN: CPT | Performed by: OBSTETRICS & GYNECOLOGY

## 2018-10-10 PROCEDURE — 86592 SYPHILIS TEST NON-TREP QUAL: CPT | Performed by: OBSTETRICS & GYNECOLOGY

## 2018-10-10 PROCEDURE — 99213 OFFICE O/P EST LOW 20 MIN: CPT

## 2018-10-10 RX ORDER — ROPIVACAINE HYDROCHLORIDE 2 MG/ML
INJECTION, SOLUTION EPIDURAL; INFILTRATION; PERINEURAL AS NEEDED
Status: DISCONTINUED | OUTPATIENT
Start: 2018-10-10 | End: 2018-10-11 | Stop reason: SURG

## 2018-10-10 RX ORDER — ONDANSETRON 2 MG/ML
4 INJECTION INTRAMUSCULAR; INTRAVENOUS EVERY 6 HOURS PRN
Status: DISCONTINUED | OUTPATIENT
Start: 2018-10-10 | End: 2018-10-11

## 2018-10-10 RX ORDER — OXYTOCIN/RINGER'S LACTATE 30/500 ML
1-30 PLASTIC BAG, INJECTION (ML) INTRAVENOUS
Status: DISCONTINUED | OUTPATIENT
Start: 2018-10-10 | End: 2018-10-11

## 2018-10-10 RX ORDER — FENTANYL CITRATE 50 UG/ML
INJECTION, SOLUTION INTRAMUSCULAR; INTRAVENOUS AS NEEDED
Status: DISCONTINUED | OUTPATIENT
Start: 2018-10-10 | End: 2018-10-11 | Stop reason: SURG

## 2018-10-10 RX ORDER — SODIUM CHLORIDE, SODIUM LACTATE, POTASSIUM CHLORIDE, CALCIUM CHLORIDE 600; 310; 30; 20 MG/100ML; MG/100ML; MG/100ML; MG/100ML
125 INJECTION, SOLUTION INTRAVENOUS CONTINUOUS
Status: DISCONTINUED | OUTPATIENT
Start: 2018-10-10 | End: 2018-10-12

## 2018-10-10 RX ORDER — PROMETHAZINE HYDROCHLORIDE 25 MG/ML
12.5 INJECTION, SOLUTION INTRAMUSCULAR; INTRAVENOUS ONCE
Status: COMPLETED | OUTPATIENT
Start: 2018-10-10 | End: 2018-10-10

## 2018-10-10 RX ORDER — FENTANYL CITRATE 50 UG/ML
INJECTION, SOLUTION INTRAMUSCULAR; INTRAVENOUS
Status: COMPLETED
Start: 2018-10-10 | End: 2018-10-10

## 2018-10-10 RX ORDER — BUTORPHANOL TARTRATE 1 MG/ML
1 INJECTION, SOLUTION INTRAMUSCULAR; INTRAVENOUS ONCE
Status: COMPLETED | OUTPATIENT
Start: 2018-10-10 | End: 2018-10-10

## 2018-10-10 RX ORDER — ROPIVACAINE HYDROCHLORIDE 2 MG/ML
INJECTION, SOLUTION EPIDURAL; INFILTRATION; PERINEURAL
Status: COMPLETED
Start: 2018-10-10 | End: 2018-10-10

## 2018-10-10 RX ADMIN — ROPIVACAINE HYDROCHLORIDE: 2 INJECTION, SOLUTION EPIDURAL; INFILTRATION at 20:15

## 2018-10-10 RX ADMIN — Medication 1 MILLI-UNITS/MIN: at 15:05

## 2018-10-10 RX ADMIN — SODIUM CHLORIDE, SODIUM LACTATE, POTASSIUM CHLORIDE, AND CALCIUM CHLORIDE 125 ML/HR: .6; .31; .03; .02 INJECTION, SOLUTION INTRAVENOUS at 20:20

## 2018-10-10 RX ADMIN — ROPIVACAINE HYDROCHLORIDE 5 ML: 2 INJECTION, SOLUTION EPIDURAL; INFILTRATION at 20:13

## 2018-10-10 RX ADMIN — FENTANYL CITRATE 100 MCG: 50 INJECTION, SOLUTION INTRAMUSCULAR; INTRAVENOUS at 20:52

## 2018-10-10 RX ADMIN — ROPIVACAINE HYDROCHLORIDE 5 ML: 2 INJECTION, SOLUTION EPIDURAL; INFILTRATION at 20:52

## 2018-10-10 RX ADMIN — SODIUM CHLORIDE, SODIUM LACTATE, POTASSIUM CHLORIDE, AND CALCIUM CHLORIDE 125 ML/HR: .6; .31; .03; .02 INJECTION, SOLUTION INTRAVENOUS at 14:37

## 2018-10-10 RX ADMIN — BUTORPHANOL TARTRATE 1 MG: 1 INJECTION, SOLUTION INTRAMUSCULAR; INTRAVENOUS at 08:57

## 2018-10-10 RX ADMIN — PROMETHAZINE HYDROCHLORIDE 12.5 MG: 25 INJECTION INTRAMUSCULAR; INTRAVENOUS at 09:02

## 2018-10-10 RX ADMIN — ROPIVACAINE HYDROCHLORIDE 5 ML: 2 INJECTION, SOLUTION EPIDURAL; INFILTRATION at 20:11

## 2018-10-10 NOTE — OB LABOR/OXYTOCIN SAFETY PROGRESS
Oxytocin Safety Progress Check Note - Alejandro Lawler 20 y o  female MRN: [id number]    Unit/Bed#: L&D 324-01 Encounter: [id number]    Obstetric History       T0      L0     SAB0   TAB0   Ectopic0   Multiple0   Live Births0      Gestational Age: 40w4d  Dose (tati-units/min) Oxytocin: 3 tati-units/min  Contraction Frequency (minutes): 6  Contraction Quality: Mild  Tachysystole: No   Dilation: 2        Effacement (%): 80  Station: -1  Baseline Rate: 135 bpm  Fetal Heart Rate: 135 BPM  FHR Category: Category I          Notes/comments:   SVE: 2/80/-1, unchanged  Continue pitocin titration 1x1 every 30 min until 4mu/min  After 4mu/min, titrate 2x2 every 30 mins    D/w Dr Grey Contreras MD 10/10/2018 4:51 PM

## 2018-10-10 NOTE — ANESTHESIA PREPROCEDURE EVALUATION
Review of Systems/Medical History          Cardiovascular  Negative cardio ROS    Pulmonary  Negative pulmonary ROS        GI/Hepatic  Negative GI/hepatic ROS               Endo/Other  History of thyroid disease , hypothyroidism,      GYN  Currently pregnant ,          Hematology  Anemia ,     Musculoskeletal  Negative musculoskeletal ROS        Neurology   Psychology           Physical Exam    Airway    Mallampati score: I  TM Distance: >3 FB  Neck ROM: full     Dental       Cardiovascular  Comment: Negative ROS, Rhythm: regular, Rate: normal, Cardiovascular exam normal    Pulmonary  Pulmonary exam normal     Other Findings        Anesthesia Plan  ASA Score- 2     Anesthesia Type- epidural with ASA Monitors  Additional Monitors:   Airway Plan:         Plan Factors-    Induction-     Postoperative Plan-     Informed Consent- Anesthetic plan and risks discussed with patient

## 2018-10-10 NOTE — OB LABOR/OXYTOCIN SAFETY PROGRESS
Oxytocin Safety Progress Check Note - Alejandro Lawler 20 y o  female MRN: [id number]    Unit/Bed#: L&D 324-01 Encounter: [id number]    Obstetric History       T0      L0     SAB0   TAB0   Ectopic0   Multiple0   Live Births0      Gestational Age: 40w4d  Dose (tati-units/min) Oxytocin: 8 tati-units/min (To increase by 1 milliunit per Dr Natty Love)  Contraction Frequency (minutes): 2 5-3  Contraction Quality: Moderate  Tachysystole: No   Dilation: 3        Effacement (%): 80  Station: -1  Baseline Rate: 135 bpm  Fetal Heart Rate: 135 BPM  FHR Category: Category I          Notes/comments:      Pt has made minimal change, uncomfortable  Discussed epidural  Pt will consider, Dr Pierre Sosa aware and recommended pitocin 1x1, pt is tim regularly         Yobani Tsang DO 10/10/2018 7:21 PM

## 2018-10-10 NOTE — PROGRESS NOTES
History & Physical - OB/GYN   Alejandro Lawler 20 y o  female MRN: [id number]  Unit/Bed#: L&D 324-01 Encounter: [id number]    Alejandro Lawlre is a patient of Dr Kelly Hinkle    Chief complaint:  My contractions are hurting    HPI:  Alejandro Lawler is a 20 y o   female with an STEW of 10/6/2018, by Last Menstrual Period at 40w4d weeks gestation who is being admitted for PROM at 0800 on 10/10/2018  She presented to L&D triage in the AM for evaluation of r/o labor and rupture  On Dr Carrera Lips exam, pt spontaneously ruptured for clear fluid  Contractions:  present  Fetal movement:  present  Vaginal bleeding:   none  Leaking of fluid:  present    Pregnancy Complications:  Hashimoto's disease  PROM    PMH:  Past Medical History:   Diagnosis Date    Hashimoto's disease     Varicella        PSH:  Past Surgical History:   Procedure Laterality Date    MYRINGOTOMY      NOSE SURGERY         Social Hx:  Denies EtOH, cigarette, and recreational drug use in pregnancy    OB Hx:  Obstetric History       T0      L0     SAB0   TAB0   Ectopic0   Multiple0   Live Births0       # Outcome Date GA Lbr Josesito/2nd Weight Sex Delivery Anes PTL Lv   1 Current                   Meds:  No current facility-administered medications on file prior to encounter  Current Outpatient Prescriptions on File Prior to Encounter   Medication Sig Dispense Refill    levothyroxine 75 mcg tablet Take 75 mcg by mouth daily      Prenatal Vit-Fe Fumarate-FA (PRENATAL VITAMIN) 27-0 8 MG TABS Take by mouth         Allergies: Allergies   Allergen Reactions    Penicillins Other (See Comments)     Will result in death       Review of Systems   Constitutional: Negative for fatigue and fever  Respiratory: Negative for cough, shortness of breath and wheezing  Gastrointestinal: Negative for diarrhea, nausea and vomiting  Genitourinary: Negative for flank pain, genital sores, hematuria, vaginal bleeding and vaginal discharge     Musculoskeletal: Positive for back pain  Physical Exam   Constitutional: She is oriented to person, place, and time  She appears well-developed and well-nourished  HENT:   Head: Normocephalic and atraumatic  Nose: Nose normal    Eyes: Pupils are equal, round, and reactive to light  EOM are normal    Neck: Normal range of motion  No JVD present  Cardiovascular: Normal rate, regular rhythm and normal heart sounds  Exam reveals no gallop and no friction rub  No murmur heard  Pulmonary/Chest: Effort normal and breath sounds normal  No stridor  No respiratory distress  She has no wheezes  She has no rales  Abdominal: Soft  Bowel sounds are normal  She exhibits no distension  There is no tenderness  There is no rebound and no guarding  gravid   Genitourinary: Vaginal discharge found  Musculoskeletal: Normal range of motion  Neurological: She is alert and oriented to person, place, and time  Skin: Skin is warm and dry  No rash noted  No pallor  Psychiatric: She has a normal mood and affect  Her behavior is normal  Judgment and thought content normal    Vitals reviewed  Cervix:  Dilation: 2  Effacement (%): 80  Station: -1    Fetal heart rate:   Baseline Rate: 125 bpm  Variability: Moderate 6-25 bpm  Accelerations: 15 x 15 or greater, At variable times  Decelerations: None  FHR Category: Category I    [place]:   Contraction Frequency (minutes): 3 5-4  Contraction Duration (seconds):   Contraction Quality: Moderate    EFW: 8 lb     GBS: negative    Membranes: ruptured 0800    Labs:  Hemoglobin: admit pending  Blood type: A-  Antibody: negative  Group B strep: negative  HIV: negative  Hepatitis B: negative  RPR: non-reactive   Rubella: Immune  Varicella Not immune  1 hour Glucose: abnormal 153    Assessment:   20 y o   at 40w4d admit for PROM    Plan:   1  IUP at 40w4d   - Intermittent FHR and tocometry monitoring  2   PROM   - Stadol/phenergan per Dr Daniels Gist   - SVE 2cm dilated and fetal head palpated per Dr Edna Campbell   3  Routine antepartum labs   - CBC, RPR, T&S stat  4  Analgesia   - Epidural per patient request  5   FEN   - Clear liquid diet, IV LR for hydration    D/w Dr Brandin Smith, DO  PGY-2 OB/GYN   10/10/2018 8:15 AM

## 2018-10-10 NOTE — OB LABOR/OXYTOCIN SAFETY PROGRESS
Labor Progress Note - Alejandro Lawler 20 y o  female MRN: [id number]    Unit/Bed#: L&D 324-01 Encounter: [id number]    Obstetric History       T0      L0     SAB0   TAB0   Ectopic0   Multiple0   Live Births0      Gestational Age: 36w2d     Contraction Frequency (minutes): irregualr  Contraction Quality: Moderate  Tachysystole: No   Dilation: 2        Effacement (%): 80  Station: -1  Baseline Rate: 130 bpm  Fetal Heart Rate: 135 BPM  FHR Category: Category I          Notes/comments:     Patient is unchanged  Due to lack of progression with prelabor rupture of membranes, will start pitocin titration 1x1 every 30 minutes  CAT I tracing  Patient is comfortable s/p stadol and phenergan  She does not desire an epidural during her labor course but is open to stadol if pain worsens      D/w Dr Raquel Shen MD 10/10/2018 1:27 PM

## 2018-10-11 PROBLEM — Z3A.40 40 WEEKS GESTATION OF PREGNANCY: Status: RESOLVED | Noted: 2018-10-08 | Resolved: 2018-10-11

## 2018-10-11 LAB
BASE EXCESS BLDCOA CALC-SCNC: -8.2 MMOL/L (ref 3–11)
BASE EXCESS BLDCOV CALC-SCNC: -6.5 MMOL/L (ref 1–9)
HCO3 BLDCOA-SCNC: 21.2 MMOL/L (ref 17.3–27.3)
HCO3 BLDCOV-SCNC: 19.1 MMOL/L (ref 12.2–28.6)
O2 CT VFR BLDCOA CALC: 7.5 ML/DL
OXYHGB MFR BLDCOA: 29.2 %
OXYHGB MFR BLDCOV: 63.6 %
PCO2 BLDCOA: 58.1 MM[HG] (ref 30–60)
PCO2 BLDCOV: 38.8 MM HG (ref 27–43)
PH BLDCOA: 7.18 [PH] (ref 7.23–7.43)
PH BLDCOV: 7.31 [PH] (ref 7.19–7.49)
PO2 BLDCOA: 17.9 MM HG (ref 5–25)
PO2 BLDCOV: 27.5 MM HG (ref 15–45)
RPR SER QL: NORMAL
SAO2 % BLDCOV: 16.6 ML/DL

## 2018-10-11 PROCEDURE — 82805 BLOOD GASES W/O2 SATURATION: CPT | Performed by: OBSTETRICS & GYNECOLOGY

## 2018-10-11 PROCEDURE — 0KQM0ZZ REPAIR PERINEUM MUSCLE, OPEN APPROACH: ICD-10-PCS | Performed by: OBSTETRICS & GYNECOLOGY

## 2018-10-11 PROCEDURE — 59409 OBSTETRICAL CARE: CPT | Performed by: OBSTETRICS & GYNECOLOGY

## 2018-10-11 RX ORDER — CALCIUM CARBONATE 200(500)MG
1000 TABLET,CHEWABLE ORAL DAILY PRN
Status: DISCONTINUED | OUTPATIENT
Start: 2018-10-11 | End: 2018-10-13 | Stop reason: HOSPADM

## 2018-10-11 RX ORDER — OXYCODONE HYDROCHLORIDE AND ACETAMINOPHEN 5; 325 MG/1; MG/1
1 TABLET ORAL EVERY 4 HOURS PRN
Status: DISCONTINUED | OUTPATIENT
Start: 2018-10-11 | End: 2018-10-13 | Stop reason: HOSPADM

## 2018-10-11 RX ORDER — ACETAMINOPHEN 325 MG/1
650 TABLET ORAL EVERY 4 HOURS PRN
Status: DISCONTINUED | OUTPATIENT
Start: 2018-10-11 | End: 2018-10-13 | Stop reason: HOSPADM

## 2018-10-11 RX ORDER — DIAPER,BRIEF,INFANT-TODD,DISP
1 EACH MISCELLANEOUS 2 TIMES DAILY
Status: DISCONTINUED | OUTPATIENT
Start: 2018-10-11 | End: 2018-10-13 | Stop reason: HOSPADM

## 2018-10-11 RX ORDER — SIMETHICONE 80 MG
80 TABLET,CHEWABLE ORAL 4 TIMES DAILY PRN
Status: DISCONTINUED | OUTPATIENT
Start: 2018-10-11 | End: 2018-10-13 | Stop reason: HOSPADM

## 2018-10-11 RX ORDER — DOCUSATE SODIUM 100 MG/1
100 CAPSULE, LIQUID FILLED ORAL 2 TIMES DAILY
Status: DISCONTINUED | OUTPATIENT
Start: 2018-10-11 | End: 2018-10-13 | Stop reason: HOSPADM

## 2018-10-11 RX ORDER — SENNOSIDES 8.6 MG
1 TABLET ORAL DAILY
Status: DISCONTINUED | OUTPATIENT
Start: 2018-10-12 | End: 2018-10-13 | Stop reason: HOSPADM

## 2018-10-11 RX ORDER — IBUPROFEN 600 MG/1
600 TABLET ORAL EVERY 6 HOURS PRN
Status: DISCONTINUED | OUTPATIENT
Start: 2018-10-11 | End: 2018-10-13 | Stop reason: HOSPADM

## 2018-10-11 RX ORDER — MAGNESIUM HYDROXIDE/ALUMINUM HYDROXICE/SIMETHICONE 120; 1200; 1200 MG/30ML; MG/30ML; MG/30ML
15 SUSPENSION ORAL EVERY 6 HOURS PRN
Status: DISCONTINUED | OUTPATIENT
Start: 2018-10-11 | End: 2018-10-13 | Stop reason: HOSPADM

## 2018-10-11 RX ORDER — ACETAMINOPHEN 325 MG/1
975 TABLET ORAL EVERY 6 HOURS PRN
Status: DISCONTINUED | OUTPATIENT
Start: 2018-10-11 | End: 2018-10-11

## 2018-10-11 RX ORDER — OXYCODONE HYDROCHLORIDE AND ACETAMINOPHEN 5; 325 MG/1; MG/1
2 TABLET ORAL EVERY 4 HOURS PRN
Status: DISCONTINUED | OUTPATIENT
Start: 2018-10-11 | End: 2018-10-13 | Stop reason: HOSPADM

## 2018-10-11 RX ADMIN — ACETAMINOPHEN 975 MG: 325 TABLET, FILM COATED ORAL at 05:25

## 2018-10-11 RX ADMIN — ROPIVACAINE HYDROCHLORIDE: 2 INJECTION, SOLUTION EPIDURAL; INFILTRATION at 13:54

## 2018-10-11 RX ADMIN — ROPIVACAINE HYDROCHLORIDE: 2 INJECTION, SOLUTION EPIDURAL; INFILTRATION at 06:17

## 2018-10-11 RX ADMIN — IBUPROFEN 600 MG: 600 TABLET, FILM COATED ORAL at 20:38

## 2018-10-11 RX ADMIN — BENZOCAINE AND LEVOMENTHOL: 200; 5 SPRAY TOPICAL at 18:21

## 2018-10-11 RX ADMIN — SODIUM CHLORIDE, SODIUM LACTATE, POTASSIUM CHLORIDE, AND CALCIUM CHLORIDE 125 ML/HR: .6; .31; .03; .02 INJECTION, SOLUTION INTRAVENOUS at 02:25

## 2018-10-11 RX ADMIN — WITCH HAZEL 1 PAD: 500 SOLUTION RECTAL; TOPICAL at 18:21

## 2018-10-11 RX ADMIN — ROPIVACAINE HYDROCHLORIDE 3 ML: 2 INJECTION, SOLUTION EPIDURAL; INFILTRATION at 06:50

## 2018-10-11 RX ADMIN — DOCUSATE SODIUM 100 MG: 100 CAPSULE, LIQUID FILLED ORAL at 18:21

## 2018-10-11 RX ADMIN — ROPIVACAINE HYDROCHLORIDE 3 ML: 2 INJECTION, SOLUTION EPIDURAL; INFILTRATION at 06:45

## 2018-10-11 NOTE — OB LABOR/OXYTOCIN SAFETY PROGRESS
Oxytocin Safety Progress Check Note - Alejandro Lawler 20 y o  female MRN: [id number]    Unit/Bed#: L&D 324-01 Encounter: [id number]    Obstetric History       T0      L0     SAB0   TAB0   Ectopic0   Multiple0   Live Births0      Gestational Age: 39w6d  Dose (tati-units/min) Oxytocin: 7 tati-units/min  Contraction Frequency (minutes): 2-4  Contraction Quality: Moderate  Tachysystole: No   Dilation: 5-6        Effacement (%): 90  Station: -1  Baseline Rate: 125 bpm  Fetal Heart Rate: 135 BPM  FHR Category: Category II          Notes/comments:      Pt examined at 0200 after prolonged decel to the 90s from 130 baseline  Spontaneous recovery over 5 minutes up to the 150s with compensatory tachcyardia noted  Moderate variability throughout  On exam pt had made slightly more change  Pitocin was halved from 14 to 7 milliunits/min  The patient was rotated on her left side, fluids were bolused  Will notify Dr Rigoberto Cavazos, observing strip closely         Taya GomezDO 10/11/2018 2:30 AM

## 2018-10-11 NOTE — OB LABOR/OXYTOCIN SAFETY PROGRESS
Oxytocin Safety Progress Check Note - Alejandro Lawler 20 y o  female MRN: [id number]    Unit/Bed#: L&D 324-01 Encounter: [id number]    Obstetric History       T0      L0     SAB0   TAB0   Ectopic0   Multiple0   Live Births0      Gestational Age: 40w4d  Dose (tati-units/min) Oxytocin: 11 tati-units/min  Contraction Frequency (minutes): 2-3  Contraction Quality: Moderate  Tachysystole: No   Dilation: 4-5        Effacement (%): 90  Station: -1  Baseline Rate: 125 bpm  Fetal Heart Rate: 135 BPM  FHR Category: Category I          Notes/comments:       Pt unchanged, Dr Yuridia Ellis aware, continue pitocin titration   Pt comfortable s/p epidural       Rakan Dupree DO 10/10/2018 10:45 PM

## 2018-10-11 NOTE — OB LABOR/OXYTOCIN SAFETY PROGRESS
Oxytocin Safety Progress Check Note - Alejandro Lawler 20 y o  female MRN: [id number]    Unit/Bed#: L&D 324-01 Encounter: [id number]    Obstetric History       T0      L0     SAB0   TAB0   Ectopic0   Multiple0   Live Births0      Gestational Age: 36w2d  Dose (tati-units/min) Oxytocin: 9 tati-units/min  Contraction Frequency (minutes): 2 5-3  Contraction Quality: Moderate  Tachysystole: No   Dilation: 4-5        Effacement (%): 90  Station: -1  Baseline Rate: 135 bpm  Fetal Heart Rate: 135 BPM  FHR Category: Category I          Notes/comments:       Pt is having some pain after the epidural   anesthesia will come and evaluate the patient for pain control   VE as above  Cat 1 tracing cont with pitocin augmentation plan for            Rigoberto Mcbride MD 10/10/2018 8:38 PM

## 2018-10-11 NOTE — OB LABOR/OXYTOCIN SAFETY PROGRESS
Oxytocin Safety Progress Check Note - Alejandro Lawler 20 y o  female MRN: [id number]    Unit/Bed#: L&D 324-01 Encounter: [id number]    Obstetric History       T0      L0     SAB0   TAB0   Ectopic0   Multiple0   Live Births0      Gestational Age: 39w6d  Dose (tati-units/min) Oxytocin: 16 tati-units/min  Contraction Frequency (minutes): 2-3  Contraction Quality: Moderate  Tachysystole: No   Dilation: 10        Effacement (%): 100  Station: 1  Baseline Rate: 135 bpm  Fetal Heart Rate: 135 BPM  FHR Category: Category II          Notes/comments:      Pt notes some abdominal discomfort with contractions on the left side  Mild vaginal pressure    Will labor down and reassess prn, turn left to see if can cover epidural window       Midge MD Russ 10/11/2018 1:04 PM

## 2018-10-11 NOTE — LACTATION NOTE
This note was copied from a baby's chart  Met with mother  Provided mother with Ready, Set, Baby booklet  Discussed Skin to Skin contact an benefits to mom and baby  Talked about the delay of the first bath until baby has adjusted  Spoke about the benefits of rooming in  Feeding on cue and what that means for recognizing infant's hunger  Avoidance of pacifiers for the first month discussed  Talked about exclusive breastfeeding for the first 6 months  Positioning and latch reviewed as well as showing images of other feeding positions  Discussed the properties of a good latch in any position  Reviewed hand/manual expression  Discussed s/s that baby is getting enough milk and some s/s that breastfeeding dyad may need further help  Gave information on common concerns, what to expect the first few weeks after delivery, preparing for other caregivers, and how partners can help  Resources for support also provided  Assisted with first feeding  Demo how to hand express colostrum, baby latching on and off  Mom's nipples flat   Enc to call for assistance as needed,phone # given

## 2018-10-11 NOTE — OB LABOR/OXYTOCIN SAFETY PROGRESS
Pitocin Progress Note - Alejandro Lawler 20 y o  female MRN: [id number]    Unit/Bed#: L&D 324-01 Encounter: [id number]    Obstetric History       T0      L0     SAB0   TAB0   Ectopic0   Multiple0   Live Births0      Gestational Age: 39w6d  Dose (tati-units/min) Oxytocin: 10 tati-units/min  Contraction Frequency (minutes): 1-4  Contraction Quality: Mild  Tachysystole: No   Dilation: 8        Effacement (%): 90  Station: -1  Baseline Rate: 130 bpm  Fetal Heart Rate: 135 BPM  FHR Category: Category I          Notes/comments:     Continue pitocin titration  She is making cervical change  CAT I tracing                Willa Javed MD 10/11/2018 7:41 AM

## 2018-10-11 NOTE — OB LABOR/OXYTOCIN SAFETY PROGRESS
Oxytocin Safety Progress Check Note - Alejandro Lawler 20 y o  female MRN: [id number]    Unit/Bed#: L&D 324-01 Encounter: [id number]    Obstetric History       T0      L0     SAB0   TAB0   Ectopic0   Multiple0   Live Births0      Gestational Age: 39w6d  Dose (tati-units/min) Oxytocin: 12 tati-units/min  Contraction Frequency (minutes): 2-2 5  Contraction Quality: Moderate  Tachysystole: No   Dilation: 5        Effacement (%): 90  Station: -1  Baseline Rate: 130 bpm  Fetal Heart Rate: 135 BPM  FHR Category: Category I          Notes/comments:      Pt continuing to make slow but steady change  Comfortable  Will continue pitocin titration and have patient rotate on peanut ball from side to side  Will update Dr Kayla Santana DO 10/11/2018 1:27 AM

## 2018-10-11 NOTE — H&P
**LATE ENTRY DUE TO ORIGINAL ENTRY AS PROGRESS NOTE**  History & Physical - OB/GYN   Alejandro Lawler 20 y o  female MRN: [id number]  Unit/Bed#: L&D 324-01 Encounter: [id number]     Alejandro Lawler is a patient of Dr Tristan Galeano     Chief complaint:  My contractions are hurting     HPI:  Alejandro Lawler is a 20 y o   female with an STEW of 10/6/2018, by Last Menstrual Period at 40w4d weeks gestation who is being admitted for PROM at 0800 on 10/10/2018  She presented to L&D triage in the AM for evaluation of r/o labor and rupture  On Dr Marya Espinal exam, pt spontaneously ruptured for clear fluid      Contractions:  present  Fetal movement:  present  Vaginal bleeding:   none  Leaking of fluid:  present     Pregnancy Complications:  Hashimoto's disease  PROM     PMH:  Medical History        Past Medical History:   Diagnosis Date    Hashimoto's disease      Varicella              PSH:  Surgical History         Past Surgical History:   Procedure Laterality Date    MYRINGOTOMY        NOSE SURGERY                Social Hx:  Denies EtOH, cigarette, and recreational drug use in pregnancy     OB Hx:               Obstetric History       T0      L0     SAB0   TAB0   Ectopic0   Multiple0   Live Births0        # Outcome Date GA Lbr Josesito/2nd Weight Sex Delivery Anes PTL Lv   1 Current                               Meds:  No current facility-administered medications on file prior to encounter               Current Outpatient Prescriptions on File Prior to Encounter   Medication Sig Dispense Refill    levothyroxine 75 mcg tablet Take 75 mcg by mouth daily        Prenatal Vit-Fe Fumarate-FA (PRENATAL VITAMIN) 27-0 8 MG TABS Take by mouth             Allergies: Allergies   Allergen Reactions    Penicillins Other (See Comments)       Will result in death         Review of Systems   Constitutional: Negative for fatigue and fever  Respiratory: Negative for cough, shortness of breath and wheezing  Gastrointestinal: Negative for diarrhea, nausea and vomiting  Genitourinary: Negative for flank pain, genital sores, hematuria, vaginal bleeding and vaginal discharge  Musculoskeletal: Positive for back pain          Physical Exam   Constitutional: She is oriented to person, place, and time  She appears well-developed and well-nourished  HENT:   Head: Normocephalic and atraumatic  Nose: Nose normal    Eyes: Pupils are equal, round, and reactive to light  EOM are normal    Neck: Normal range of motion  No JVD present  Cardiovascular: Normal rate, regular rhythm and normal heart sounds  Exam reveals no gallop and no friction rub  No murmur heard  Pulmonary/Chest: Effort normal and breath sounds normal  No stridor  No respiratory distress  She has no wheezes  She has no rales  Abdominal: Soft  Bowel sounds are normal  She exhibits no distension  There is no tenderness  There is no rebound and no guarding  gravid   Genitourinary: Vaginal discharge found  Musculoskeletal: Normal range of motion  Neurological: She is alert and oriented to person, place, and time  Skin: Skin is warm and dry  No rash noted  No pallor  Psychiatric: She has a normal mood and affect   Her behavior is normal  Judgment and thought content normal    Vitals reviewed         Cervix:  Dilation: 2  Effacement (%): 80  Station: -1     Fetal heart rate:   Baseline Rate: 125 bpm  Variability: Moderate 6-25 bpm  Accelerations: 15 x 15 or greater, At variable times  Decelerations: None  FHR Category: Category I     [facility]:   Contraction Frequency (minutes): 3 5-4  Contraction Duration (seconds):   Contraction Quality: Moderate     EFW: 8 lb      GBS: negative     Membranes: ruptured 0800     Labs:  Hemoglobin: admit pending  Blood type: A-  Antibody: negative  Group B strep: negative  HIV: negative  Hepatitis B: negative  RPR: non-reactive   Rubella: Immune  Varicella Not immune  1 hour Glucose: abnormal 153     Assessment: 20 y o   at 40w4d admit for PROM     Plan:   1  IUP at 40w4d              - Intermittent FHR and tocometry monitoring  2  PROM              - Stadol/phenergan per Dr Serene Tamayo              - SVE 2cm dilated and fetal head palpated per Dr Serene Tamayo   3  Routine antepartum labs              - CBC, RPR, T&S stat  4  Analgesia              - Epidural per patient request  5   FEN              - Clear liquid diet, IV LR for hydration     D/w Dr Robbin Garber, DO  PGY-2 OB/GYN   10/10/2018 8:15 AM

## 2018-10-11 NOTE — OB LABOR/OXYTOCIN SAFETY PROGRESS
Labor Progress Note - Alejandro Lawler 20 y o  female MRN: [id number]    Unit/Bed#: L&D 324-01 Encounter: [id number]    Obstetric History       T0      L0     SAB0   TAB0   Ectopic0   Multiple0   Live Births0      Gestational Age: 39w6d  Dose (tati-units/min) Oxytocin: 7 tati-units/min  Contraction Frequency (minutes): 1-4  Contraction Quality: Mild  Tachysystole: No   Dilation: 6        Effacement (%): 90  Station: -1  Baseline Rate: 130 bpm  Fetal Heart Rate: 135 BPM  FHR Category: Category I          Notes/comments:      Pt stated she felt more pressure  Slightly more change   Continue current management       Franko Padilla DO 10/11/2018 3:44 AM

## 2018-10-11 NOTE — L&D DELIVERY NOTE
Vaginal Delivery Summary - OB/GYN   Alejandro Lawler 20 y o  female MRN: [id number]  Unit/Bed#: L&D 324-01 Encounter: [id number]          Predelivery Diagnosis:  1  Pregnancy at 40w4d   2  Hashimoto's disease  3  PROM    Postdelivery Diagnosis:  1  Same as above  2  Delivery of term   3  Terminal meconium    Procedure: Spontaneous Vaginal Delivery, repair of second degree laceration     Attending: Nelda Larsen MD    Assistant: Makenzie Braun    Anesthesia: Epidural    EBL: 300cc  Admission H 4  Admission platelets: 515O    Complications: none apparent    Specimens: cord blood, arterial and venous cord blood gasses, placenta to storage    Findings:   1  Viable male at 5, with APGARS of 8 and 9 at 1 and 5 minutes respectively,  2  Spontaneous delivery of intact placenta at   3  2 degree laceration repaired with 2-0 vicryl   4  Blood gases:   Arterial pH: 7 181   Arterial base excess: -8 2   Venous pH: 7 310   Venous base excess: -6 5    Disposition:  Patient tolerated the procedure well and was recovering in labor and delivery room     Brief history and labor course:  Ms Mulugeta Astorga is a 20 y o   at 44wk2d  She presented to labor and delivery for premature rupture of membranes  Her pregnancy was complicated  On exam in triage she was noted to be 2/80/-1  She was admitted for PROM  Description of procedure    After pushing for 131 minutes, at 1626 patient delivered a viable male , wt 8 lb 5 oz, apgars of 8 (1 min) and 9 (5 min)  The fetal vertex delivered spontaneously  Baby was checked for nuchal  No nuchal cord was visualized  The anterior shoulder delivered atraumatically with maternal expulsive forces and the assistance of downward traction  The posterior shoulder delivered with maternal expulsive forces and the assistance of upward traction  The remainder of the fetus delivered spontaneously  Large amount of meconium fluid was noted after the delivery of the       Upon delivery, the infant was placed on the mothers abdomen and the cord was clamped and cut  Delayed cord clamping was performed  The infant was noted to cry spontaneously and was moving all extremities appropriately  There was no evidence for injury  Awaiting nurse resuscitators evaluated the   Arterial and venous cord blood gases and cord blood was collected for analysis  These were promptly sent to the lab  In the immediate post-partum, 30 units of IV pitocin was administered, and the uterus was noted to contract down well with massage and pitocin  The placenta delivered spontaneously at 1630 and was noted to have a centrally inserted 3 vessel cord  The vagina, cervix, perineum, and rectum were inspected and there was noted to be a second degree laceration that was repaired with 2-0 vicryl  At the conclusion of the procedure, all needle, sponge, and instrument counts were noted to be correct  Patient tolerated the procedure well and was allowed to recover in labor and delivery room with family and  before being transferred to the post-partum floor  The attending was present and participated in all key portions of the case  Jael Olsen MD  10/11/2018  5:17 PM     I agree with the operative report as dictated by Dr Pavan Gonzalez and edited by me I was present for the entire procedure      Arnell Libman, MD

## 2018-10-11 NOTE — DISCHARGE SUMMARY
Discharge Summary - Alejandro Lawler 20 y o  female MRN: [id number]    Unit/Bed#: L&D 324-01 Encounter: [id number]    Admission Date: 10/10/2018     Discharge Date: 10/13/2018    Admitting Diagnosis:   1  Term pregnancy at 40w4d  2  PROM  3  Hashimoto's disease  4  Rh negative status    Discharge Diagnosis:   Same, delivered    Procedures:   spontaneous vaginal delivery    Admitting Attending: Mark Craven MD  Delivery Attending: Mark Craven MD  Discharge Attending: Dr Korey Martinez Course:     Patrick Lawler is a 20 y o  Delcie Carie who was admitted at 40w4d for prelabor rupture membranes  On initial exam, she had clear moderate fluids at 0800  She made gradual cervical change and Pitocin was started for protracted dilation  She received an epidural for pain control  She proceeded to make continued cervical change and became complete at 1304  She started pushing at 1417  She then underwent an uncomplicated spontaneous vaginal delivery and delivered a viable male  at 5  APGARS were 8, 9 at 1 and 5 minutes, respectively   weighed 8lb 5oz  Placenta was delivered at 1630   was then transferred to  nursery  Patient tolerated the procedure well and was transferred to recovery in stable condition  The patient's post partum course was unremarkable  On day of discharge, she was ambulating and able to reasonably perform all ADLs  She was voiding and had appropriate bowel function  Pain was well controlled  She was discharged home on postpartum day #2 without complications  Patient was instructed to follow up with her OB as an outpatient and was given appropriate warnings to call provider if she develops signs of infection or uncontrolled pain      On day of discharge she was ambulating, voiding spontaneously, tolerating oral intake and hemodynamically stable  Condition at discharge:   stable     Disposition:   Home    Planned Readmission:   No    Discharge Medications: Prenatal vitamin daily for 6 months or the duration of nursing whichever is longer  Motrin 600 mg orally every 6 hours as needed for pain  Tylenol (over the counter) per bottle directions as needed for pain  Hydrocortisone cream 1% (over the counter) applied 1-2x daily to hemorrhoids as needed  Witch hazel pads for hemorrhoidal discomfort as needed    Discharge instructions :   -Do not place anything (no partner, tampons or douche) in your vagina for 6 weeks  -You may walk for exercise for the first 6 weeks then gradually return to your usual activities    -Please do not drive for 1 week if you have no stitches and for 2 weeks if you have stitches or underwent a  delivery     -You may take baths or shower per your preference    -Please look at your bust (breasts) in the mirror daily and call provider for redness or tenderness or increased warmth  - If you have had a  please look at your incision daily as well and call provider for increasing redness or steady drainage from the incision    -Please call your provider if temperature > 100 4*F or 38* C, worsening pain or a foul discharge

## 2018-10-11 NOTE — PLAN OF CARE
Problem: BIRTH - VAGINAL/ SECTION  Goal: Fetal and maternal status remain reassuring during the birth process  INTERVENTIONS:  - Monitor vital signs  - Monitor fetal heart rate  - Monitor uterine activity  - Monitor labor progression (vaginal delivery)  - DVT prophylaxis  - Antibiotic prophylaxis   Outcome: Progressing    Goal: Emotionally satisfying birthing experience for mother/fetus  Interventions:  - Assess, plan, implement and evaluate the nursing care given to the patient in labor  - Advocate the philosophy that each childbirth experience is a unique experience and support the family's chosen level of involvement and control during the labor process   - Actively participate in both the patient's and family's teaching of the birth process  - Consider cultural, Adventism and age-specific factors and plan care for the patient in labor   Outcome: Progressing      Problem: INFECTION - ADULT  Goal: Absence or prevention of progression during hospitalization  INTERVENTIONS:  - Assess and monitor for signs and symptoms of infection  - Monitor lab/diagnostic results  - Monitor all insertion sites, i e  indwelling lines, tubes, and drains  - Monitor endotracheal (as able) and nasal secretions for changes in amount and color  - [place]pava appropriate cooling/warming therapies per order  - Administer medications as ordered  - Instruct and encourage patient and family to use good hand hygiene technique  - Identify and instruct in appropriate isolation precautions for identified infection/condition   Outcome: Progressing      Problem: SAFETY ADULT  Goal: Patient will remain free of falls  INTERVENTIONS:  - Assess patient frequently for physical needs  -  Identify cognitive and physical deficits and behaviors that affect risk of falls    -  [place] fall precautions as indicated by assessment   - Educate patient/family on patient safety including physical limitations  - Instruct patient to call for assistance with activity based on assessment  - Modify environment to reduce risk of injury  - Consider OT/PT consult to assist with strengthening/mobility   Outcome: Progressing      Problem: Knowledge Deficit  Goal: Verbalizes understanding of labor plan  Assess patient/family/caregiver's baseline knowledge level and ability to understand information  Provide education via patient/family/caregiver's preferred learning method at appropriate level of understanding  1  Provide teaching at level of understanding  2  Provide teaching via preferred learning method(s)  Outcome: Progressing      Problem: Labor & Delivery  Goal: Manages discomfort  Assess and monitor for signs and symptoms of discomfort  Assess patient's pain level regularly and per hospital policy  Administer medications as ordered  Support use of nonpharmacological methods to help control pain such as distraction, imagery, relaxation, and application of heat and cold  Collaborate with interdisciplinary team and patient to determine appropriate pain management plan  1  Include patient in decisions related to comfort  2  Offer non-pharmacological pain management interventions  3  Report ineffective pain management to physician     Outcome: Progressing

## 2018-10-11 NOTE — ANESTHESIA PROCEDURE NOTES
Epidural Block    Patient location during procedure: OB  Start time: 10/10/2018 8:03 PM  Reason for block: at surgeon's request and primary anesthetic  Staffing  Anesthesiologist: Naga Hussein  Performed: anesthesiologist   Preanesthetic Checklist  Completed: patient identified, site marked, surgical consent, pre-op evaluation, timeout performed, IV checked, risks and benefits discussed and monitors and equipment checked  Epidural  Patient position: sitting  Prep: Betadine  Patient monitoring: frequent blood pressure checks  Approach: midline  Location: lumbar (1-5)  Injection technique: BARI saline  Needle  Needle type: Tuohy   Needle gauge: 18 G  Catheter type: side hole  Catheter size: 20 G  Test dose: negative  Assessment  Sensory level: [id number] aspiration for CSF, negative aspiration for heme and no paresthesia on injection  patient tolerated the procedure well with no immediate complications

## 2018-10-11 NOTE — OB LABOR/OXYTOCIN SAFETY PROGRESS
Oxytocin Safety Progress Check Note - Alejandro Lawler 20 y o  female MRN: [id number]    Unit/Bed#: L&D 324-01 Encounter: [id number]    Obstetric History       T0      L0     SAB0   TAB0   Ectopic0   Multiple0   Live Births0      Gestational Age: 39w6d  Dose (tati-units/min) Oxytocin: 16 tati-units/min  Contraction Frequency (minutes): 1-4  Contraction Quality: Mild  Tachysystole: No   Dilation: 9        Effacement (%): 100  Station: 1  Baseline Rate: 130 bpm  Fetal Heart Rate: 135 BPM  FHR Category: Category I          Notes/comments:     Patient making excellent cervical change on pitocin  CAT I tracing    She is feeling more discomfort but overall comfortable with epidural     D/w Dr Camryn Khan MD 10/11/2018 11:19 AM

## 2018-10-11 NOTE — DISCHARGE INSTRUCTIONS
Vaginal Delivery   WHAT YOU NEED TO KNOW:   A vaginal delivery occurs when your baby is born through your vagina (birth canal)  DISCHARGE INSTRUCTIONS:   Seek care immediately if:   · Your leg feels warm, tender, and painful  It may look swollen and red  · You have a fever  · You are urinating very little, or not at all  · You have heavy vaginal bleeding that fills 1 or more sanitary pads in 1 hour  · You feel weak, dizzy, or faint  Contact your healthcare provider if:   · Your abdominal or perineal pain does not go away, or gets worse  · You feel depressed  · You have questions or concerns about your condition or care  Medicines:  · NSAIDs , such as ibuprofen, help decrease swelling, pain, and fever  This medicine is available with or without a doctor's order  NSAIDs can cause stomach bleeding or kidney problems in certain people  If you take blood thinner medicine, always ask your healthcare provider if NSAIDs are safe for you  Always read the medicine label and follow directions  · Stool softeners  make it easier for you to have a bowel movement  You may need this medicine to treat or prevent constipation  · Take your medicine as directed  Contact your healthcare provider if you think your medicine is not helping or if you have side effects  Tell him or her if you are allergic to any medicine  Keep a list of the medicines, vitamins, and herbs you take  Include the amounts, and when and why you take them  Bring the list or the pill bottles to follow-up visits  Carry your medicine list with you in case of an emergency  Follow up with your healthcare provider:  Most women need to return 6 weeks after a vaginal delivery  Ask your healthcare provider how to care for your wounds or stitches, if you have them  Write down your questions so you remember to ask them during your visits  Activity:  Rest as much as possible  Try to keep all activities short   You may be able to do some exercise soon after you have your baby  Talk with your healthcare provider before you start exercising  If you work outside the home, ask when you can return to your job  Kegel exercises:  Kegel exercises may help your vaginal and rectal muscles heal faster  You can do Kegel exercises by tightening and relaxing the muscles around your vagina  Kegel exercises help make the muscles stronger  Breast care:  When your milk comes in, your breasts may feel full and hard  Ask how to care for your breasts, even if you are not breastfeeding  Constipation:  You may have constipation for a period of time after you have your baby  Do not try to push the bowel movement out if it is too hard  High-fiber foods and extra liquids can help you prevent constipation  Examples of high-fiber foods are fruit and bran  Prune juice and water are good liquids to drink  You may also be told to take over-the-counter fiber and stool softener medicines  Take these items as directed  Ask how to prevent or treat hemorrhoids  Perineum care: Your perineum is the area between your vagina and anus  Keep the area clean and dry  This will help it heal and prevent infection  Wash the area gently with soap and water when you bathe or shower  Rinse your perineum with warm water after you urinate or have a bowel movement  Your healthcare provider may suggest you use a warm sitz bath to help decrease pain  To take a sitz bath, fill a bathtub with 4 to 6 inches of warm water  You may also use a sitz bath pan that fits inside the toilet  Sit in the sitz bath for 20 minutes  Do this 2 to 3 times a day, or as directed  The warm water can help decrease pain and swelling  Vaginal discharge: You will have vaginal discharge, called lochia, after your delivery  The lochia is red or dark brown with clots for 1 to 3 days after the birth  The amount will decrease and turn pale pink or brown for 3 to 10 days  It will turn white or yellow on the 10th or 14th day  Lochia is usually gone within 3 weeks  Use a sanitary pad rather than a tampon to prevent a vaginal infection  You will have lochia for up to 3 weeks after your baby is born  Monthly periods: Your period may start again within 7 to 9 weeks after your baby is born  If you are breastfeeding, it may take longer for your period to start again  You can still get pregnant again even though you do not have your monthly period  Talk with your healthcare provider about a birth control method if you do not want to get pregnant  Mood changes: Many new mothers have some kind of mood changes after delivery  Some of these changes occur because of lack of sleep, hormone changes, and caring for a new baby  Some mood changes can be more serious, such as postpartum depression  Talk with your healthcare provider if you feel unable to care for yourself or your baby  Sexual activity:  Do not have sex until your healthcare provider says it is okay  You may notice you have a decreased desire for sex, or sex may be painful  You may need to use a vaginal lubricant (gel) to help make sex more comfortable  © 2017 [facility] [facility] Information is for End User's use only and may not be sold, redistributed or otherwise used for commercial purposes  All illustrations and images included in CareNotes® are the copyrighted property of A D A M , Inc  or Cesar Aviles  The above information is an  only  It is not intended as medical advice for individual conditions or treatments  Talk to your doctor, nurse or pharmacist before following any medical regimen to see if it is safe and effective for you

## 2018-10-12 LAB
ABO GROUP BLD: NORMAL
BLD GP AB SCN SERPL QL: NEGATIVE
FETAL CELL SCN BLD QL ROSETTE: NEGATIVE
RH BLD: NEGATIVE

## 2018-10-12 PROCEDURE — 86901 BLOOD TYPING SEROLOGIC RH(D): CPT | Performed by: OBSTETRICS & GYNECOLOGY

## 2018-10-12 PROCEDURE — 86900 BLOOD TYPING SEROLOGIC ABO: CPT | Performed by: OBSTETRICS & GYNECOLOGY

## 2018-10-12 PROCEDURE — 85461 HEMOGLOBIN FETAL: CPT | Performed by: OBSTETRICS & GYNECOLOGY

## 2018-10-12 PROCEDURE — 86850 RBC ANTIBODY SCREEN: CPT | Performed by: OBSTETRICS & GYNECOLOGY

## 2018-10-12 PROCEDURE — 99024 POSTOP FOLLOW-UP VISIT: CPT | Performed by: OBSTETRICS & GYNECOLOGY

## 2018-10-12 RX ADMIN — LEVOTHYROXINE SODIUM 75 MCG: 50 TABLET ORAL at 06:14

## 2018-10-12 RX ADMIN — HUMAN RHO(D) IMMUNE GLOBULIN 300 MCG: 300 INJECTION, SOLUTION INTRAMUSCULAR at 11:12

## 2018-10-12 RX ADMIN — DOCUSATE SODIUM 100 MG: 100 CAPSULE, LIQUID FILLED ORAL at 08:35

## 2018-10-12 RX ADMIN — DOCUSATE SODIUM 100 MG: 100 CAPSULE, LIQUID FILLED ORAL at 17:49

## 2018-10-12 NOTE — LACTATION NOTE
This note was copied from a baby's chart  Mother verbalized breastfeeding is going well  Denies any nipple pain or problems  Supportive dad at bedside  Enc to call for assistance as needed,phone # given

## 2018-10-12 NOTE — PROGRESS NOTES
Progress Note - OB/GYN   Alejandro Lawler 20 y o  female MRN: [id number]  Unit/Bed#: L&D 307-01 Encounter: [id number]    Assessment:  Post partum Day #1 s/p , stable, baby in room    Plan:  1) Rh negative   - Rhogam ordered    2) Continue routine post partum care   Encourage ambulation   Encourage breastfeeding   Anticipate discharge 10/13/18     Subjective/Objective   Chief Complaint:     Post delivery  Patient is doing well  Lochia WNL  Pain well controlled  Subjective:     Pain: yes, cramping, improved with meds  Tolerating PO: yes  Voiding: yes  Flatus: no  BM: no  Ambulating: yes  Breastfeeding:  yes  Chest pain: no  Shortness of breath: no  Leg pain: no  Lochia: minimal    Objective:     Vitals: BP 99/64 (BP Location: Right arm)   Pulse 74   Temp 98 2 °F (36 8 °C) (Oral)   Resp 12   Ht 5' 2" (1 575 m)   Wt 78 kg (172 lb)   LMP 2017 (Exact Date)   SpO2 100%   Breastfeeding? Yes   BMI 31 46 kg/m²       Intake/Output Summary (Last 24 hours) at 10/12/18 0656  Last data filed at 10/12/18 0157   Gross per 24 hour   Intake              580 ml   Output             1950 ml   Net            -1370 ml       Lab Results   Component Value Date    WBC 7 81 10/10/2018    HGB 12 4 10/10/2018    HCT 39 2 10/10/2018    MCV 87 10/10/2018     10/10/2018       Physical Exam:     Gen: AAOx3, NAD  CV: RRR  Lungs: CTA b/l  Abd: Soft, non-tender, non-distended, no rebound or guarding  Uterine fundus firm and non-tender, -1 cm below the umbilicus     Ext: Non tender  Vulva: Swelling is appropriate    Marielena Man MD  10/12/2018  6:56 AM

## 2018-10-12 NOTE — SOCIAL WORK
CM met with MOB and FOEmily LARA, to do a general SW assessment  MOB gave birth to a baby boy, Tracie Claire  Parents live together in [address]  This is their first baby  Parents report having all necessary items for the infant at discharge  MOB is breastfeeding and has a pump  She is interested in [facility] services  Will provide more information  Parents drive  They are undecided on a pediatrician  PNC provided through Dr Marielena Tamayo  No mental health hx or concerns at this time  No social concerns identified

## 2018-10-12 NOTE — PLAN OF CARE

## 2018-10-13 VITALS
BODY MASS INDEX: 31.65 KG/M2 | RESPIRATION RATE: 16 BRPM | TEMPERATURE: 98.4 F | HEIGHT: 62 IN | SYSTOLIC BLOOD PRESSURE: 119 MMHG | DIASTOLIC BLOOD PRESSURE: 85 MMHG | HEART RATE: 88 BPM | OXYGEN SATURATION: 97 % | WEIGHT: 172 LBS

## 2018-10-13 RX ADMIN — LEVOTHYROXINE SODIUM 75 MCG: 50 TABLET ORAL at 06:26

## 2018-10-13 RX ADMIN — DOCUSATE SODIUM 100 MG: 100 CAPSULE, LIQUID FILLED ORAL at 08:46

## 2018-10-13 RX ADMIN — WITCH HAZEL 1 PAD: 500 SOLUTION RECTAL; TOPICAL at 13:01

## 2018-10-13 NOTE — PROGRESS NOTES
Pt encouraged to hand express milk  Total 15ml of milk expressed  Feed baby via syringe  Pt denies any nipple pain  Supportive mother at bedside  Will continue to monitor

## 2018-10-13 NOTE — LACTATION NOTE
This note was copied from a baby's chart  Met with mother to go over feeding log since birth for the first week  Emphasized 8 or more (12) feedings in a 24 hour period, what to expect for the number of diapers per day of life and the progression of properties of the  stooling pattern  Discussed s/s that breastfeeding is going well after day 4 and when to get help from a pediatrician or lactation support person after day 4  Booklet included Breast Pumping Instructions, When You Go Back to Work or School, and Breastfeeding Resources for after discharge including access to the number for the [employer]  Baby not latching  Shield provided with Instructions on use  Demo with teach back  Baby at breast doing much better feeding  Encouraged MOB to call for assistance, questions, and concerns about breastfeeding  Extension provided

## 2018-10-13 NOTE — PROGRESS NOTES
Progress Note - OB/GYN   Alejandro Lawler 20 y o  female MRN: [id number]  Unit/Bed#: L&D 307-01 Encounter: [id number]    Alejandro Lawler is a patient of [facility]  Assessment:  Post partum day #2 s/p , stable, and doing well  Baby in the room  Plan:  1  Rh negative   - Patient received Rhogam on 10/12/18  2  Continue routine post partum care   - Encourage ambulation   - Encourage breastfeeding  3  Disposition   - Anticipate discharge home today      Subjective/Objective     Chief Complaint:     Post partum day #2 from a  with no complaints today  Subjective:   Pain: mild soreness in lower abdomen  Tolerating Oral Intake: yes  Voiding: yes  Flatus: yes  Bowel Movement: no  Ambulating: yes  Breastfeeding: Breastfeeding  Chest Pain: no  Shortness of Breath: no  Leg Pain/Discomfort: no  Lochia: minimal    Objective:   Vitals: /71 (BP Location: Left arm)   Pulse 88   Temp 98 °F (36 7 °C) (Oral)   Resp 20   Ht 5' 2" (1 575 m)   Wt 78 kg (172 lb)   LMP 2017 (Exact Date)   SpO2 97%   Breastfeeding?  Yes   BMI 31 46 kg/m²     No intake or output data in the 24 hours ending 10/13/18 0607    Lab Results   Component Value Date    WBC 7 81 10/10/2018    HGB 12 4 10/10/2018    HCT 39 2 10/10/2018    MCV 87 10/10/2018     10/10/2018       Meds/Allergies   Current Facility-Administered Medications   Medication Dose Route Frequency    acetaminophen (TYLENOL) tablet 650 mg  650 mg Oral Q4H PRN    aluminum-magnesium hydroxide-simethicone (MYLANTA) 200-200-20 mg/5 mL oral suspension 15 mL  15 mL Oral Q6H PRN    calcium carbonate (TUMS) chewable tablet 1,000 mg  1,000 mg Oral Daily PRN    docusate sodium (COLACE) capsule 100 mg  100 mg Oral BID    hydrocortisone 1 % cream 1 application  1 application Topical BID    ibuprofen (MOTRIN) tablet 600 mg  600 mg Oral Q6H PRN    levothyroxine tablet 75 mcg  75 mcg Oral Daily Before Breakfast    oxyCODONE-acetaminophen (PERCOCET) 5-325 mg per tablet 1 tablet  1 tablet Oral Q4H PRN    oxyCODONE-acetaminophen (PERCOCET) 5-325 mg per tablet 2 tablet  2 tablet Oral Q4H PRN    senna (SENOKOT) tablet 8 6 mg  1 tablet Oral Daily    simethicone (MYLICON) chewable tablet 80 mg  80 mg Oral 4x Daily PRN    witch hazel-glycerin (TUCKS) topical pad 1 pad  1 pad Topical PRN       Physical Exam:  General: in no apparent distress, well developed and well nourished, non-toxic, in no respiratory distress and acyanotic, alert, oriented times 3, afebrile, normal vitals and cooperative  Cardiovascular: Cor RRR and No murmurs  Lungs: clear to auscultation bilaterally  Abdomen: abdomen is soft without significant tenderness, masses, organomegaly or guarding  Fundus: Firm and nontender, -1 cm below the umbilicus  Lower extremeties: nontender    Eliseo Rock MD

## 2018-10-15 NOTE — UTILIZATION REVIEW
Notification of Maternity Inpatient Admission/Maternity Inpatient Authorization Request  This is a Notification of Maternity Inpatient Admission/Maternity Inpatient Authorization Request to our facility [facility]  Please be advised that this patient is currently in our facility under Inpatient Status  Below you will find the Birth/ Summary, Attending Physician and Facilitys information including NPI#  and contact information for the Utilization Review Department where the patient is receiving care services  Facility: [place] [address]  Address: [facility]note 3[facility]  Phone: [phone] Tax ID: [id number]  NPI: [id number]  Medicare ID: [id number]    Place of Service Code: 24   Place of Service Name: Inpatient Hospital  Presentation Date & Time: 10/10/2018  6:02 AM  Inpatient Admission Date & Time: 10/10/18 1326  Discharge Date & Time: 10/13/2018  2:34 PM   Discharge Disposition (if discharged): Home/Self Care  Attending Physician & NPI: Enoch Lion Md [[id number]]  ELIAS Díaz  Specialty- Obstetrics and Gynecology  [facility] ID- [id number]  [address], [address] [place] [address]  Phone 1: [phone]  Fax: [phone]  Mother of [place] Information: Reynaldo Arguelles   MRN: [id number] YOB: 1995   Mother's Admitting Diagnosis: Encounter for full-term uncomplicated delivery [G95]  Estimated Date of Delivery: 10/6/18  Type of Delivery: Vaginal, Spontaneous Delivery    Delivering clinician: Emmy Wagner   OB History      Para Term  AB Living    1 1 1     1    SAB TAB Ectopic Multiple Live Births          0 1         Name & MRN:   Information for the patient's :  Shon Soares [[id number]]     [place] Delivery Information:  Sex: male  Delivered 10/11/2018 4:26 PM by Vaginal, Spontaneous Delivery;  Gestational Age: 39w6d     Measurements:  Weight: 8 lb 5 oz (3770 g); Height: 21 5"    APGAR 1 minute 5 minutes 10 minutes   Totals: 8 9      Thank you,  [address] [place] [address] Review Department  Phone: Trish Jacobo  - [phone]; Fax [phone]  ATTENTION: Please call with any questions or concerns to [phone]  and carefully follow the prompts so that you are directed to the right person  Send all requests for admission clinical reviews, approved or denied determinations and any other requests to fax [phone]   All voicemails are confidential

## 2018-10-19 LAB — PLACENTA IN STORAGE: NORMAL

## 2018-11-15 ENCOUNTER — POSTPARTUM VISIT (OUTPATIENT)
Dept: OBGYN CLINIC | Facility: CLINIC | Age: 23
End: 2018-11-15
Payer: COMMERCIAL

## 2018-11-15 VITALS
DIASTOLIC BLOOD PRESSURE: 76 MMHG | HEART RATE: 85 BPM | OXYGEN SATURATION: 98 % | SYSTOLIC BLOOD PRESSURE: 120 MMHG | WEIGHT: 154.6 LBS | BODY MASS INDEX: 28.28 KG/M2

## 2018-11-15 DIAGNOSIS — Z30.09 CONTRACEPTIVE EDUCATION: ICD-10-CM

## 2018-11-15 PROBLEM — O28.9 ABNORMAL ANTENATAL TEST: Status: RESOLVED | Noted: 2018-07-19 | Resolved: 2018-11-15

## 2018-11-15 PROBLEM — E06.3 HYPOTHYROIDISM DUE TO HASHIMOTO'S THYROIDITIS: Status: ACTIVE | Noted: 2018-11-15

## 2018-11-15 PROBLEM — Z34.02 SUPERVISION OF LOW-RISK FIRST PREGNANCY, SECOND TRIMESTER: Status: RESOLVED | Noted: 2018-03-29 | Resolved: 2018-11-15

## 2018-11-15 PROBLEM — E03.8 HYPOTHYROIDISM DUE TO HASHIMOTO'S THYROIDITIS: Status: ACTIVE | Noted: 2018-11-15

## 2018-11-15 PROBLEM — O99.283 HYPOTHYROIDISM AFFECTING PREGNANCY IN THIRD TRIMESTER: Status: RESOLVED | Noted: 2018-03-29 | Resolved: 2018-11-15

## 2018-11-15 PROBLEM — Z67.91 RH NEGATIVE, ANTEPARTUM, THIRD TRIMESTER: Status: RESOLVED | Noted: 2018-04-28 | Resolved: 2018-11-15

## 2018-11-15 PROBLEM — O99.013 ANEMIA AFFECTING PREGNANCY IN THIRD TRIMESTER: Status: RESOLVED | Noted: 2018-07-19 | Resolved: 2018-11-15

## 2018-11-15 PROBLEM — E03.9 HYPOTHYROIDISM AFFECTING PREGNANCY IN THIRD TRIMESTER: Status: RESOLVED | Noted: 2018-03-29 | Resolved: 2018-11-15

## 2018-11-15 PROBLEM — O26.893 RH NEGATIVE, ANTEPARTUM, THIRD TRIMESTER: Status: RESOLVED | Noted: 2018-04-28 | Resolved: 2018-11-15

## 2018-11-15 PROCEDURE — 99214 OFFICE O/P EST MOD 30 MIN: CPT | Performed by: OBSTETRICS & GYNECOLOGY

## 2018-11-15 RX ORDER — ACETAMINOPHEN AND CODEINE PHOSPHATE 120; 12 MG/5ML; MG/5ML
1 SOLUTION ORAL DAILY
Qty: 28 TABLET | Refills: 3 | Status: SHIPPED | OUTPATIENT
Start: 2018-11-15 | End: 2018-12-12 | Stop reason: SDUPTHER

## 2018-11-15 NOTE — PROGRESS NOTES
Patient is a 20 y o  Schuyler Durant with Patient's last menstrual period was 2017 (exact date)  who presents for postpartum examination s/p  on 10/11/2018  The pt is without complaints and reports she is breast feeding without complaints  She reports her lochia has stopped and she has not had a menses as of yet  She denies any si/sx of ppd and scored a "0" today  She reports she has not been sexually active as of yet and reports she desires POPs for contraception  Past Medical History:   Diagnosis Date    Hashimoto's disease     Varicella        Past Surgical History:   Procedure Laterality Date    MYRINGOTOMY      NOSE SURGERY         OB History    Para Term  AB Living   1 1 1     1   SAB TAB Ectopic Multiple Live Births         0 1      # Outcome Date GA Lbr Josesito/2nd Weight Sex Delivery Anes PTL Lv   1 Term 10/11/18 40w5d  3770 g (8 lb 5 oz) M Vag-Spont  N ZACH              Current Outpatient Prescriptions:     levothyroxine 75 mcg tablet, Take 75 mcg by mouth daily, Disp: , Rfl:     Prenatal Vit-Fe Fumarate-FA (PRENATAL VITAMIN) 27-0 8 MG TABS, Take by mouth, Disp: , Rfl:     norethindrone (MICRONOR) 0 35 MG tablet, Take 1 tablet (0 35 mg total) by mouth daily, Disp: 28 tablet, Rfl: 3    Allergies   Allergen Reactions    Penicillins Other (See Comments)     Will result in death per patient         Social History     Social History    Marital status: /Civil Union     Spouse name: N/A    Number of children: N/A    Years of education: college     Occupational History    Housewife      Social History Main Topics    Smoking status: Never Smoker    Smokeless tobacco: Never Used    Alcohol use No    Drug use: No    Sexual activity: Yes     Partners: Male     Birth control/ protection: None     Other Topics Concern    None     Social History Narrative    Episcopalian    Would accept blood products        Family History   Problem Relation Age of Onset    Hashimoto's thyroiditis Mother     Chronic bronchitis Father        Review of Systems   Constitutional: Negative for chills, fatigue, fever and unexpected weight change  HENT: Negative for congestion, mouth sores and sore throat  Respiratory: Negative for cough, chest tightness, shortness of breath and wheezing  Cardiovascular: Negative for chest pain and palpitations  Gastrointestinal: Negative for abdominal distention, abdominal pain, constipation, diarrhea, nausea and vomiting  Endocrine: Negative for cold intolerance and heat intolerance  Genitourinary: Negative for dyspareunia, dysuria, genital sores, menstrual problem, pelvic pain, vaginal bleeding, vaginal discharge and vaginal pain  Musculoskeletal: Negative for arthralgias  Skin: Negative for color change and rash  Neurological: Negative for dizziness, light-headedness and headaches  Hematological: Negative for adenopathy  Blood pressure 120/76, pulse 85, weight 70 1 kg (154 lb 9 6 oz), last menstrual period 12/30/2017, SpO2 98 %, currently breastfeeding  and Body mass index is 28 28 kg/m²  Physical Exam   Constitutional: She is oriented to person, place, and time  She appears well-developed and well-nourished  HENT:   Head: Normocephalic and atraumatic  Eyes: Conjunctivae and EOM are normal    Neck: Normal range of motion  No tracheal deviation present  No thyromegaly present  Cardiovascular: Normal rate, regular rhythm and normal heart sounds  Pulmonary/Chest: Effort normal and breath sounds normal  No stridor  No respiratory distress  She has no wheezes  She has no rales  Abdominal: Soft  Bowel sounds are normal  She exhibits no distension and no mass  There is no tenderness  There is no rebound and no guarding  Musculoskeletal: Normal range of motion  She exhibits no edema or tenderness  Lymphadenopathy:     She has no cervical adenopathy  Neurological: She is alert and oriented to person, place, and time     Skin: Skin is warm  No rash noted  No erythema  Psychiatric: She has a normal mood and affect  Her behavior is normal  Judgment and thought content normal         vulva: normal external genitalia for age and no lesions, masses, epithelial changes, or exudate  vagina: color pink and rugae  well formed rugae  cervix: parous and no lesions   uterus: NSSC, AF, NT, mobile  adnexa: no masses or tenderness      A/P:  Pt is a 20 y o   with      Diagnoses and all orders for this visit:    Postpartum care and examination of lactating mother    Contraceptive education  -     norethindrone (MICRONOR) 0 35 MG tablet;  Take 1 tablet (0 35 mg total) by mouth daily

## 2018-12-12 ENCOUNTER — OFFICE VISIT (OUTPATIENT)
Dept: OBGYN CLINIC | Facility: CLINIC | Age: 23
End: 2018-12-12
Payer: COMMERCIAL

## 2018-12-12 VITALS
BODY MASS INDEX: 28.52 KG/M2 | DIASTOLIC BLOOD PRESSURE: 68 MMHG | HEIGHT: 62 IN | OXYGEN SATURATION: 98 % | HEART RATE: 76 BPM | WEIGHT: 155 LBS | SYSTOLIC BLOOD PRESSURE: 124 MMHG

## 2018-12-12 DIAGNOSIS — N76.0 BACTERIAL VAGINITIS: Primary | ICD-10-CM

## 2018-12-12 DIAGNOSIS — N89.8 VAGINAL DISCHARGE: ICD-10-CM

## 2018-12-12 DIAGNOSIS — N94.9 VAGINAL BURNING: ICD-10-CM

## 2018-12-12 DIAGNOSIS — Z30.09 CONTRACEPTIVE EDUCATION: ICD-10-CM

## 2018-12-12 DIAGNOSIS — B96.89 BACTERIAL VAGINITIS: Primary | ICD-10-CM

## 2018-12-12 DIAGNOSIS — N89.8 VAGINAL ODOR: ICD-10-CM

## 2018-12-12 LAB — SL AMB POCT WET MOUNT: ABNORMAL

## 2018-12-12 PROCEDURE — 99214 OFFICE O/P EST MOD 30 MIN: CPT | Performed by: OBSTETRICS & GYNECOLOGY

## 2018-12-12 PROCEDURE — 87210 SMEAR WET MOUNT SALINE/INK: CPT | Performed by: OBSTETRICS & GYNECOLOGY

## 2018-12-12 RX ORDER — ACETAMINOPHEN AND CODEINE PHOSPHATE 120; 12 MG/5ML; MG/5ML
1 SOLUTION ORAL DAILY
Qty: 28 TABLET | Refills: 0 | Status: SHIPPED | OUTPATIENT
Start: 2018-12-12

## 2018-12-12 RX ORDER — METRONIDAZOLE 500 MG/1
500 TABLET ORAL EVERY 12 HOURS SCHEDULED
Qty: 14 TABLET | Refills: 0 | Status: SHIPPED | OUTPATIENT
Start: 2018-12-12 | End: 2018-12-19

## 2018-12-12 NOTE — PROGRESS NOTES
Patient is a 20 y o  Isabella Nassar with No LMP recorded (lmp unknown)  who presents requesting evaluation of suspected infection  She reports  She reports an increase in yellow colored discharge that has a foul odor  She reports it has been present for a two weeks  She reports the discharge is daily  She has not tried any OTC medications  She reports she has pain with intercourse for the last 2 weeks as well  She reports the odor is exacerbated by intercourse  She also notes burning with intercourse for the last two week  She reports no vaginal bleeding, but has noted a pink tinged discharge today  She reports she has not been having menses since she is breast feeding  She reports she does have low back pain and is not sure if she is getting her first menses  Pt requests refill of ocp x 1 month  Past Medical History:   Diagnosis Date    Hashimoto's disease     Varicella        Past Surgical History:   Procedure Laterality Date    MYRINGOTOMY      NOSE SURGERY         OB History    Para Term  AB Living   1 1 1     1   SAB TAB Ectopic Multiple Live Births         0 1      # Outcome Date GA Lbr Josesito/2nd Weight Sex Delivery Anes PTL Lv   1 Term 10/11/18 40w5d  3770 g (8 lb 5 oz) M Vag-Spont  N ZACH              Current Outpatient Prescriptions:     levothyroxine 75 mcg tablet, Take 75 mcg by mouth daily, Disp: , Rfl:     norethindrone (MICRONOR) 0 35 MG tablet, Take 1 tablet (0 35 mg total) by mouth daily, Disp: 28 tablet, Rfl: 0    Prenatal Vit-Fe Fumarate-FA (PRENATAL VITAMIN) 27-0 8 MG TABS, Take by mouth, Disp: , Rfl:     metroNIDAZOLE (FLAGYL) 500 mg tablet, Take 1 tablet (500 mg total) by mouth every 12 (twelve) hours for 7 days, Disp: 14 tablet, Rfl: 0    Allergies   Allergen Reactions    Penicillins Other (See Comments)     Will result in death per patient         Social History     Social History    Marital status: /Civil Union     Spouse name: N/A    Number of children: N/A    Years of education: college     Occupational History    Housewife      Social History Main Topics    Smoking status: Never Smoker    Smokeless tobacco: Never Used    Alcohol use No    Drug use: No    Sexual activity: Not Currently     Partners: Male     Birth control/ protection: None     Other Topics Concern    None     Social History Narrative    Gnosticist    Would accept blood products        Family History   Problem Relation Age of Onset    Hashimoto's thyroiditis Mother     Chronic bronchitis Father        Review of Systems   Constitutional: Negative for chills, fatigue, fever and unexpected weight change  HENT: Negative for congestion, mouth sores and sore throat  Respiratory: Negative for cough, chest tightness, shortness of breath and wheezing  Cardiovascular: Negative for chest pain and palpitations  Gastrointestinal: Negative for abdominal distention, abdominal pain, constipation, diarrhea, nausea and vomiting  Endocrine: Negative for cold intolerance and heat intolerance  Genitourinary: Positive for dyspareunia and vaginal discharge  Negative for dysuria, genital sores, menstrual problem, pelvic pain, vaginal bleeding and vaginal pain  Musculoskeletal: Negative for arthralgias  Skin: Negative for color change and rash  Neurological: Negative for dizziness, light-headedness and headaches  Hematological: Negative for adenopathy  Blood pressure 124/68, pulse 76, height 5' 2" (1 575 m), weight 70 3 kg (155 lb), SpO2 98 %, currently breastfeeding  and Body mass index is 28 35 kg/m²  Physical Exam   Constitutional: She is oriented to person, place, and time  She appears well-developed and well-nourished  HENT:   Head: Normocephalic and atraumatic  Eyes: Conjunctivae and EOM are normal    Neck: Normal range of motion  Pulmonary/Chest: Effort normal    Abdominal: Soft  She exhibits no distension and no mass  There is no tenderness  There is no rebound and no guarding  Musculoskeletal: Normal range of motion  She exhibits no edema or tenderness  Neurological: She is alert and oriented to person, place, and time  Skin: Skin is warm  No rash noted  No erythema  Psychiatric: She has a normal mood and affect  Her behavior is normal  Judgment and thought content normal         vulva: normal external genitalia for age and no lesions, masses, epithelial changes, or exudate  vagina: color pink, rugae  well formed rugae and discharge  yellow  cervix: parous and no lesions   uterus: NSSC, AF, NT, mobile  adnexa: no masses or tenderness    Wet Mount/KOH Results:  Bacteria: several  Clue cells:  many  Trichomonas: absent  Yeast (with or without hyphae): absent  PH: blue (basic)  KOH:  + whiff test; negative for yeast          A/P:  Pt is a 20 y o   with      Rana was seen today for vaginal discharge  Diagnoses and all orders for this visit:    Bacterial vaginitis  -     POCT wet mount  -     metroNIDAZOLE (FLAGYL) 500 mg tablet; Take 1 tablet (500 mg total) by mouth every 12 (twelve) hours for 7 days    Vaginal discharge  -     POCT wet mount    Vaginal burning  -     POCT wet mount    Vaginal odor  -     POCT wet mount    Contraceptive education  -     norethindrone (MICRONOR) 0 35 MG tablet;  Take 1 tablet (0 35 mg total) by mouth daily

## 2021-04-13 DIAGNOSIS — Z23 ENCOUNTER FOR IMMUNIZATION: ICD-10-CM

## 2024-04-04 PROBLEM — G56.03 BILATERAL CARPAL TUNNEL SYNDROME: Status: ACTIVE | Noted: 2023-02-15

## 2024-04-04 PROBLEM — M54.2 NECK PAIN: Status: ACTIVE | Noted: 2023-02-15

## 2024-04-04 PROBLEM — G56.21 CUBITAL TUNNEL SYNDROME ON RIGHT: Status: ACTIVE | Noted: 2023-02-15

## 2024-04-05 ENCOUNTER — OFFICE VISIT (OUTPATIENT)
Dept: FAMILY MEDICINE CLINIC | Facility: CLINIC | Age: 29
End: 2024-04-05
Payer: COMMERCIAL

## 2024-04-05 VITALS
BODY MASS INDEX: 31.34 KG/M2 | DIASTOLIC BLOOD PRESSURE: 70 MMHG | OXYGEN SATURATION: 96 % | WEIGHT: 166 LBS | SYSTOLIC BLOOD PRESSURE: 90 MMHG | HEART RATE: 87 BPM | HEIGHT: 61 IN

## 2024-04-05 DIAGNOSIS — E06.3 HYPOTHYROIDISM DUE TO HASHIMOTO'S THYROIDITIS: ICD-10-CM

## 2024-04-05 DIAGNOSIS — E03.8 HYPOTHYROIDISM DUE TO HASHIMOTO'S THYROIDITIS: ICD-10-CM

## 2024-04-05 DIAGNOSIS — H69.93 DYSFUNCTION OF BOTH EUSTACHIAN TUBES: ICD-10-CM

## 2024-04-05 DIAGNOSIS — Z13.220 SCREENING FOR LIPID DISORDERS: ICD-10-CM

## 2024-04-05 DIAGNOSIS — G56.03 BILATERAL CARPAL TUNNEL SYNDROME: ICD-10-CM

## 2024-04-05 DIAGNOSIS — Z13.1 SCREENING FOR DIABETES MELLITUS: ICD-10-CM

## 2024-04-05 DIAGNOSIS — M54.12 CERVICAL RADICULOPATHY: Primary | ICD-10-CM

## 2024-04-05 PROBLEM — B37.2 YEAST DERMATITIS: Status: RESOLVED | Noted: 2018-08-16 | Resolved: 2024-04-05

## 2024-04-05 PROBLEM — B97.7 HPV IN FEMALE: Status: ACTIVE | Noted: 2023-01-11

## 2024-04-05 PROCEDURE — 99204 OFFICE O/P NEW MOD 45 MIN: CPT | Performed by: NURSE PRACTITIONER

## 2024-04-05 RX ORDER — LEVOTHYROXINE SODIUM 0.1 MG/1
100 TABLET ORAL
Qty: 90 TABLET | Refills: 1 | Status: SHIPPED | OUTPATIENT
Start: 2024-04-05

## 2024-04-05 RX ORDER — FLUTICASONE PROPIONATE 50 MCG
1 SPRAY, SUSPENSION (ML) NASAL DAILY
COMMUNITY

## 2024-04-05 NOTE — ASSESSMENT & PLAN NOTE
Patient does have symptoms consistent with bilateral carpal tunnel syndrome.  I feel that her cervical radiculopathy is also contributing to the numbness and paresthesias of her bilateral hands as well.  She was advised to continue using Tylenol and ice as needed for pain and to begin bilateral wrist bracing.  Orthopedics referral was also placed for follow-up regarding this.

## 2024-04-05 NOTE — ASSESSMENT & PLAN NOTE
Well-controlled on current regimen.  Repeat TSH level was ordered to be completed prior to patient's next office visit.

## 2024-04-05 NOTE — ASSESSMENT & PLAN NOTE
Patient is currently using Flonase daily.  Patient is currently breast-feeding so I did advise her that Flonase is the best option to treat this condition.  If her symptoms worsen we could consider an oral antihistamine in the future as well.

## 2024-04-05 NOTE — PATIENT INSTRUCTIONS
Neck Exercises   AMBULATORY CARE:   Neck exercises  help reduce neck pain, and improve neck movement and strength. Neck exercises also help prevent long-term neck problems.  Call your doctor if:   Your pain does not get better, or gets worse.     You have questions or concerns about your condition, care, or exercise program.     What you need to know about exercise safety:   Move slowly, gently, and smoothly.  Avoid fast or jerky motions.     Stand and sit the way your healthcare provider shows you.  Good posture may reduce your neck pain. Check your posture often, even when you are not doing your neck exercises.     Follow the exercise program recommended by your healthcare provider.  He or she will tell you which exercises are best for your condition. He or she will also tell you how many repetitions to do and how often you should do the exercises.     How to perform neck exercises safely:   Exercise position:  You may sit or stand while you do neck exercises. Face forward. Your shoulders should be straight and relaxed, with a good posture.          Head tilts, forward and back:  Gently bow your head and try to touch your chin to your chest. Your healthcare provider may tell you to push on the back of your neck to help bow your head. Raise your chin back to the starting position. Tilt your head back as far as possible so you are looking up at the ceiling. Your healthcare provider may tell you to lift your chin to help tilt your head back. Return your head to the starting position.          Head tilts, side to side:  Tilt your head, bringing your ear toward your shoulder. Then tilt your head toward the other shoulder.          Head turns:  Turn your head to look over your shoulder. Tilt your chin down and try to touch it to your shoulder. Do not raise your shoulder to your chin. Face forward again. Do the same on the other side.          Head rolls:  Slowly bring your chin toward your chest. Next, roll your head to  the right. Your ear should be positioned over your shoulder. Hold this position for 5 seconds. Roll your head back toward your chest and to the left into the same position. Hold for 5 seconds. Gently roll your head back and around in a clockwise Tel[place] 3 times. Next, move your head in the reverse direction (counterclockwise) in a Tel[place] 3 times. Do not shrug your shoulders upwards while you do this exercise.        Follow up with your doctor as directed:  Write down your questions so you remember to ask them during your visits.  © Copyright [employer] 2023 Information is for End User's use only and may not be sold, redistributed or otherwise used for commercial purposes.  The above information is an  only. It is not intended as medical advice for individual conditions or treatments. Talk to your doctor, nurse or pharmacist before following any medical regimen to see if it is safe and effective for you.

## 2024-04-05 NOTE — ASSESSMENT & PLAN NOTE
Patient does have symptoms consistent with cervical radiculopathy.  X-ray of the cervical spine was ordered to assess for causes of her symptoms.  Orthopedics referral was also placed for follow-up regarding her symptoms.  Due to her currently breast-feeding I did advise her that Tylenol and ice as needed would be her best options for pain control at this time.

## 2024-04-05 NOTE — PROGRESS NOTES
Name: Alejandro Lawler      : 1995      MRN: [id number]  Encounter Provider: DALLAS Hernandez  Encounter Date: 2024   Encounter department: [facility]    Assessment & Plan     1. Cervical radiculopathy  Assessment & Plan:  Patient does have symptoms consistent with cervical radiculopathy.  X-ray of the cervical spine was ordered to assess for causes of her symptoms.  Orthopedics referral was also placed for follow-up regarding her symptoms.  Due to her currently breast-feeding I did advise her that Tylenol and ice as needed would be her best options for pain control at this time.    Orders:  -     XR spine cervical complete 4 or 5 vw non injury; Future; Expected date: 2024  -     Ambulatory Referral to Orthopedic Surgery; Future    2. Hypothyroidism due to Hashimoto's thyroiditis  Assessment & Plan:  Well-controlled on current regimen.  Repeat TSH level was ordered to be completed prior to patient's next office visit.    Orders:  -     levothyroxine 100 mcg tablet; Take 1 tablet (100 mcg total) by mouth daily before breakfast 30 minutes prior  -     TSH, 3rd generation; Future  -     TSH, 3rd generation    3. Screening for diabetes mellitus  -     Comprehensive metabolic panel; Future  -     Comprehensive metabolic panel    4. Screening for lipid disorders  -     Lipid panel; Future  -     Lipid panel    5. Bilateral carpal tunnel syndrome  Assessment & Plan:  Patient does have symptoms consistent with bilateral carpal tunnel syndrome.  I feel that her cervical radiculopathy is also contributing to the numbness and paresthesias of her bilateral hands as well.  She was advised to continue using Tylenol and ice as needed for pain and to begin bilateral wrist bracing.  Orthopedics referral was also placed for follow-up regarding this.    Orders:  -     Ambulatory Referral to Orthopedic Surgery; Future    6. Dysfunction of both eustachian tubes  Assessment & Plan:  Patient is  currently using Flonase daily.  Patient is currently breast-feeding so I did advise her that Flonase is the best option to treat this condition.  If her symptoms worsen we could consider an oral antihistamine in the future as well.           Subjective      Neck pain/bilateral hand numbness: The patient reports over the past 2 years she has been experiencing recurrent posterior neck pain.  She reports that the pain also radiates to her bilateral shoulders, bilateral arms, and bilateral hands.  The patient does report recurrent numbness and paresthesias of her bilateral hands as well however, she was diagnosed with bilateral carpal tunnel syndrome in the past.  She reports that she did receive injections for the carpal tunnel syndrome in the past but this did not improve her symptoms.  She does report decreased range of motion of her neck and worsening pain with movement.  Patient has never had imaging completed on her neck in the past.    Bilateral otalgia: Patient reports over the past 24 hours she has been noting bilateral otalgia.  She denies any hearing loss or discharge from her bilateral ears.  She reports that she does have a history of recurrent otitis media and she did have tympanostomy tubes as a child.  She reports that she has never needed to take allergy medication in the past.    Hypothyroidism due to Hashimoto's thyroiditis: Patient's most recent TSH level was normal.  Patient is currently managed on levothyroxine 100 mcg daily.      Review of Systems   Constitutional:  Negative for chills and fever.   HENT:  Positive for ear pain (bilateral). Negative for congestion, postnasal drip, rhinorrhea, sinus pressure, sinus pain and sore throat.    Eyes:  Negative for pain and visual disturbance.   Respiratory:  Negative for cough, chest tightness, shortness of breath and wheezing.    Cardiovascular:  Negative for chest pain, palpitations and leg swelling.   Gastrointestinal:  Negative for abdominal pain,  "constipation, diarrhea, nausea and vomiting.   Endocrine: Negative for cold intolerance and heat intolerance.   Genitourinary:  Negative for decreased urine volume, dysuria and hematuria.   Musculoskeletal:  Positive for arthralgias (bilateral shoulders), myalgias (BUE) and neck pain. Negative for back pain.   Skin:  Negative for color change and rash.   Allergic/Immunologic: Negative for environmental allergies.   Neurological:  Positive for numbness (bilateral hands). Negative for dizziness, seizures, syncope, weakness, light-headedness and headaches.   Hematological:  Negative for adenopathy.   Psychiatric/Behavioral:  Negative for confusion. The patient is not nervous/anxious.    All other systems reviewed and are negative.      Current Outpatient Medications on File Prior to Visit   Medication Sig   • fluticasone (FLONASE) 50 mcg/act nasal spray 1 spray into each nostril daily   • Multiple Vitamin (MULTIVITAMIN ADULT PO) as directed Orally   • Prenatal Vit-Fe Fumarate-FA (PRENATAL VITAMIN) 27-0.8 MG TABS Take by mouth   • [DISCONTINUED] levothyroxine 100 mcg tablet Take 100 mcg by mouth daily before breakfast 30 minutes prior       Objective     BP 90/70 (BP Location: Right arm, Patient Position: Sitting, Cuff Size: Standard)   Pulse 87   Ht 5' 1\" (1.549 m)   Wt 75.3 kg (166 lb)   SpO2 96%   BMI 31.37 kg/m²     Physical Exam  Vitals and nursing note reviewed.   Constitutional:       General: She is not in acute distress.     Appearance: Normal appearance. She is not ill-appearing.   HENT:      Head: Normocephalic.      Right Ear: Hearing normal. A middle ear effusion (small) is present.      Left Ear: Hearing normal. A middle ear effusion (small) is present.      Mouth/Throat:      Pharynx: No pharyngeal swelling, oropharyngeal exudate, posterior oropharyngeal erythema or uvula swelling.      Tonsils: No tonsillar exudate or tonsillar abscesses.      Comments: Small tonsolith noted on the right " tonsil.  Eyes:      Conjunctiva/sclera: Conjunctivae normal.   Neck:      Comments: Pain was noted with palpation of the midline cervical spine, cervical paraspinals, and bilateral trapezius areas.  Patient also reports that pain frequently radiates down her bilateral arms and into her bilateral hands with associated numbness and paresthesias of the bilateral hands.  Patient did have noted decreased range of motion of the neck with increased pain especially when turning the head from side-to-side.  Spurling's test was also positive bilaterally for cervical nerve root compression.  Cardiovascular:      Rate and Rhythm: Normal rate and regular rhythm.      Pulses: Normal pulses.           Carotid pulses are 2+ on the right side and 2+ on the left side.       Dorsalis pedis pulses are 2+ on the right side and 2+ on the left side.        Posterior tibial pulses are 2+ on the right side and 2+ on the left side.      Heart sounds: Normal heart sounds. No murmur heard.  Pulmonary:      Effort: Pulmonary effort is normal. No respiratory distress.      Breath sounds: Normal breath sounds. No decreased breath sounds, wheezing, rhonchi or rales.   Abdominal:      General: Abdomen is flat. Bowel sounds are normal. There is no distension.      Palpations: Abdomen is soft.      Tenderness: There is no abdominal tenderness. There is no guarding.   Musculoskeletal:      Cervical back: Tenderness and bony tenderness present. No swelling, edema, erythema, rigidity, spasms or crepitus. Pain with movement, spinous process tenderness and muscular tenderness present. Decreased range of motion.      Right lower leg: No edema.      Left lower leg: No edema.      Comments: Phalen's test was negative bilaterally however, Tinel's test was positive bilaterally for CTS pathology.   Skin:     General: Skin is warm and dry.      Capillary Refill: Capillary refill takes less than 2 seconds.   Neurological:      General: No focal deficit present.       Mental Status: She is alert and oriented to person, place, and time.   Psychiatric:         Mood and Affect: Mood normal.         Behavior: Behavior normal.         Thought Content: Thought content normal.         Judgment: Judgment normal.       DALLAS Hernandez

## 2024-04-09 ENCOUNTER — APPOINTMENT (OUTPATIENT)
Dept: RADIOLOGY | Facility: CLINIC | Age: 29
End: 2024-04-09
Payer: COMMERCIAL

## 2024-04-09 DIAGNOSIS — M54.12 CERVICAL RADICULOPATHY: ICD-10-CM

## 2024-04-09 PROCEDURE — 72050 X-RAY EXAM NECK SPINE 4/5VWS: CPT

## 2024-04-15 DIAGNOSIS — G56.03 BILATERAL CARPAL TUNNEL SYNDROME: ICD-10-CM

## 2024-04-15 DIAGNOSIS — M54.12 CERVICAL RADICULOPATHY: Primary | ICD-10-CM

## 2024-05-01 ENCOUNTER — NURSE TRIAGE (OUTPATIENT)
Age: 29
End: 2024-05-01

## 2024-05-01 ENCOUNTER — TELEPHONE (OUTPATIENT)
Age: 29
End: 2024-05-01

## 2024-05-01 ENCOUNTER — CONSULT (OUTPATIENT)
Dept: CARDIOLOGY CLINIC | Facility: CLINIC | Age: 29
End: 2024-05-01
Payer: COMMERCIAL

## 2024-05-01 VITALS
BODY MASS INDEX: 31.75 KG/M2 | HEIGHT: 61 IN | WEIGHT: 168.2 LBS | DIASTOLIC BLOOD PRESSURE: 62 MMHG | SYSTOLIC BLOOD PRESSURE: 112 MMHG | HEART RATE: 71 BPM

## 2024-05-01 DIAGNOSIS — R00.2 PALPITATIONS: Primary | ICD-10-CM

## 2024-05-01 DIAGNOSIS — R07.9 LEFT-SIDED CHEST PAIN: ICD-10-CM

## 2024-05-01 DIAGNOSIS — R07.9 LEFT-SIDED CHEST PAIN: Primary | ICD-10-CM

## 2024-05-01 PROCEDURE — 99204 OFFICE O/P NEW MOD 45 MIN: CPT | Performed by: INTERNAL MEDICINE

## 2024-05-01 PROCEDURE — 93000 ELECTROCARDIOGRAM COMPLETE: CPT | Performed by: INTERNAL MEDICINE

## 2024-05-01 NOTE — PROGRESS NOTES
"      Cardiology             Alejandro Lawler  1995  [id number]                Assessment/Plan:    Multiple symptoms including paresthesias, palpitations, chest discomfort, dyspnea        This patient's multiple symptoms including palpitations, chest discomfort, dyspnea, and paresthesias/numbness/tingling sound noncardiac in etiology.  She does admit to some dyspnea and chest tightness when climbing up a flight of stairs and a sensation of tachycardia.  She presents today for an urgent visit due to her symptoms.  A 48-hour Holter monitor has been ordered by her PCP.  I will schedule her for a treadmill exercise stress test and echocardiogram for objective evaluation.  She will call after completion of her studies to discuss results over the phone.  I have strongly recommend that she follow-up with her PCP in the near future as her symptoms are not convincing of a cardiac etiology.  Her cardiac examination and ECG are within normal limits.      Interval History:     This is a 29-year-old female with no prior cardiac history.  She states that she has had palpitations in the past, occurring infrequently where she will feel tachycardia in her chest, especially when she would climb up a flight of stairs.  Yesterday, her baby cried at night and woke her up from sleep.  When she sat up, she felt that her entire body was \"numb.\"  She specifically describes a paresthesia-like feeling in both arms and legs.  She checked her blood pressure and states it was 130/100 at the time.  She became anxious and started getting chest discomfort along with tingling in her chest radiating into her left arm.  She presents today for an urgent evaluation for the same.  She states she still has some tingling in her chest and left arm.  She admits to shortness of breath when she climbs up a flight of stairs along with some chest discomfort which she describes as tightness.  She denies lower extremity edema.  She has had no cardiac testing " "in the past.  She denies any family history premature CAD, sudden death, or congenital cardiac abnormalities.  She is a non-smoker, does not drink alcohol or use illicit drugs.            Vitals:  Vitals:    05/01/24 1529   BP: 112/62   BP Location: Left arm   Patient Position: Sitting   Cuff Size: Adult   Pulse: 71   Weight: 76.3 kg (168 lb 3.2 oz)   Height: 5' 1\" (1.549 m)         Past Medical History:   Diagnosis Date   • Hashimoto's disease    • Varicella    • Yeast dermatitis 08/16/2018     Social History     Socioeconomic History   • Marital status: /Civil Union     Spouse name: Not on file   • Number of children: Not on file   • Years of education: college   • Highest education level: Not on file   Occupational History   • Occupation: Housewife   Tobacco Use   • Smoking status: Never   • Smokeless tobacco: Never   Vaping Use   • Vaping status: Never Used   Substance and Sexual Activity   • Alcohol use: No   • Drug use: No   • Sexual activity: Yes     Partners: Male     Birth control/protection: None   Other Topics Concern   • Not on file   Social History Narrative    [employer]    Would accept blood products      Social Determinants of Health     Financial Resource Strain: Not on file   Food Insecurity: Not on file   Transportation Needs: Not on file   Physical Activity: Not on file   Stress: Not on file   Social Connections: Not on file   Intimate Partner Violence: Not on file   Housing Stability: Not on file      Family History   Problem Relation Age of Onset   • Hashimoto's thyroiditis Mother    • Chronic bronchitis Father      Past Surgical History:   Procedure Laterality Date   • MYRINGOTOMY     • NOSE SURGERY         Current Outpatient Medications:   •  fluticasone (FLONASE) 50 mcg/act nasal spray, 1 spray into each nostril daily, Disp: , Rfl:   •  levothyroxine 100 mcg tablet, Take 1 tablet (100 mcg total) by mouth daily before breakfast 30 minutes prior, Disp: 90 tablet, Rfl: 1  •  Multiple Vitamin " (MULTIVITAMIN ADULT PO), as directed Orally, Disp: , Rfl:   •  Prenatal Vit-Fe Fumarate-FA (PRENATAL VITAMIN) 27-0.8 MG TABS, Take by mouth, Disp: , Rfl:         Review of Systems:  Review of Systems      Physical Exam:  Physical Exam    This note was completed in part utilizing M-Modal Fluency Direct Software.  Grammatical errors, random word insertions, spelling mistakes, and incomplete sentences can be an occasional consequence of this system secondary to software limitations, ambient noise, and hardware issues.  If you have any questions or concerns about the content, text, or information contained within the body of this dictation, please contact the provider for clarification.

## 2024-05-01 NOTE — TELEPHONE ENCOUNTER
"Reason for Disposition   Numbness or tingling on both sides of body and is a new symptom lasting > 24 hours    Answer Assessment - Initial Assessment Questions  1. SYMPTOM: \"What is the main symptom you are concerned about?\" (e.g., weakness, numbness)      Numbness/tingling to whole body last night. Still with numbness/tingling to left side on and off  2. ONSET: \"When did this start?\" (minutes, hours, days; while sleeping)      Month ago  3. LAST NORMAL: \"When was the last time you were normal (no symptoms)?\"      Prior to last month  4. PATTERN \"Does this come and go, or has it been constant since it started?\"  \"Is it present now?\"      Intermittent numbness. Tingling present now  5. CARDIAC SYMPTOMS: \"Have you had any of the following symptoms: chest pain, difficulty breathing, palpitations?\"      Chest pain- middle of chest close to throat, palpitations  6. NEUROLOGIC SYMPTOMS: \"Have you had any of the following symptoms: headache, dizziness, vision loss, double vision, changes in speech, unsteady on your feet?\"      Nausea  7. OTHER SYMPTOMS: \"Do you have any other symptoms?\"      Palpitations, chest pain  8. PREGNANCY: \"Is there any chance you are pregnant?\" \"When was your last menstrual period?\"      Denies    Protocols used: Neurologic Deficit-ADULT-[place]    "

## 2024-05-01 NOTE — TELEPHONE ENCOUNTER
"Patient message stated \"Last night I woke up all numb,little shivering, little pain in left chest and upper back and felt like tingling inside of mt my left side and my blood pressure was 130/100 which is unusual and never happened to me \"  Patient was seen in office with c/o of numbness on 4/5/24, called patient to f/u on chest pain stated in message. Per Patient not having chest pain just feels discomfort and tingling  in shoulder, BP this AM was 120/89, still having numbness on left side. Patient requesting Cardiology referral.  "

## 2024-05-01 NOTE — TELEPHONE ENCOUNTER
Patient called to schedule consult, has been referred by PCP.      She has been experiencing numbness and tingling to body for a month.  Still feeling some tingling to left side today.  She also reports feeling palpitations, and some discomfort to middle of chest intermittently.    PCP has ordered her a monitor, and was told it could be applied at cardiology visit.    Scheduled patient for consult today.

## 2024-05-01 NOTE — LETTER
May 1, 2024     Domingo Vasquez, [place]  [address]  [address]  [place] [place] [facility] [address]    Patient: Alejandro Lawler   YOB: 1995   Date of Visit: 5/1/2024       Dear Dr. Vasquez:    Thank you for referring Alejandro Lawler to me for evaluation. Below are my notes for this consultation.    I have a low suspicion that her symptoms are due to a cardiac etiology.  I recommended that she call your office and follow-up with you soon as possible.  Thank you.        Sincerely,          London Blakely DO        CC: No Recipients    London Blakely DO  5/1/2024  4:01 PM  Sign when Signing Visit        Cardiology             Alejandro Lawler  1995  [id number]                Assessment/Plan:    Multiple symptoms including paresthesias, palpitations, chest discomfort, dyspnea        This patient's multiple symptoms including palpitations, chest discomfort, dyspnea, and paresthesias/numbness/tingling sound noncardiac in etiology.  She does admit to some dyspnea and chest tightness when climbing up a flight of stairs and a sensation of tachycardia.  She presents today for an urgent visit due to her symptoms.  A 48-hour Holter monitor has been ordered by her PCP.  I will schedule her for a treadmill exercise stress test and echocardiogram for objective evaluation.  She will call after completion of her studies to discuss results over the phone.  I have strongly recommend that she follow-up with her PCP in the near future as her symptoms are not convincing of a cardiac etiology.  Her cardiac examination and ECG are within normal limits.      Interval History:     This is a 29-year-old female with no prior cardiac history.  She states that she has had palpitations in the past, occurring infrequently where she will feel tachycardia in her chest, especially when she would climb up a flight of stairs.  Yesterday, her baby cried at night and woke her up from sleep.  When she sat up, she felt that her entire body was  "\"numb.\"  She specifically describes a paresthesia-like feeling in both arms and legs.  She checked her blood pressure and states it was 130/100 at the time.  She became anxious and started getting chest discomfort along with tingling in her chest radiating into her left arm.  She presents today for an urgent evaluation for the same.  She states she still has some tingling in her chest and left arm.  She admits to shortness of breath when she climbs up a flight of stairs along with some chest discomfort which she describes as tightness.  She denies lower extremity edema.  She has had no cardiac testing in the past.  She denies any family history premature CAD, sudden death, or congenital cardiac abnormalities.  She is a non-smoker, does not drink alcohol or use illicit drugs.            Vitals:  Vitals:    05/01/24 1529   BP: 112/62   BP Location: Left arm   Patient Position: Sitting   Cuff Size: Adult   Pulse: 71   Weight: 76.3 kg (168 lb 3.2 oz)   Height: 5' 1\" (1.549 m)         Past Medical History:   Diagnosis Date   • Hashimoto's disease    • Varicella    • Yeast dermatitis 08/16/2018     Social History     Socioeconomic History   • Marital status: /Civil Union     Spouse name: Not on file   • Number of children: Not on file   • Years of education: college   • Highest education level: Not on file   Occupational History   • Occupation: Housewife   Tobacco Use   • Smoking status: Never   • Smokeless tobacco: Never   Vaping Use   • Vaping status: Never Used   Substance and Sexual Activity   • Alcohol use: No   • Drug use: No   • Sexual activity: Yes     Partners: Male     Birth control/protection: None   Other Topics Concern   • Not on file   Social History Narrative    Tenriism    Would accept blood products      Social Determinants of Health     Financial Resource Strain: Not on file   Food Insecurity: Not on file   Transportation Needs: Not on file   Physical Activity: Not on file   Stress: Not on file "   Social Connections: Not on file   Intimate Partner Violence: Not on file   Housing Stability: Not on file      Family History   Problem Relation Age of Onset   • Hashimoto's thyroiditis Mother    • Chronic bronchitis Father      Past Surgical History:   Procedure Laterality Date   • MYRINGOTOMY     • NOSE SURGERY         Current Outpatient Medications:   •  fluticasone (FLONASE) 50 mcg/act nasal spray, 1 spray into each nostril daily, Disp: , Rfl:   •  levothyroxine 100 mcg tablet, Take 1 tablet (100 mcg total) by mouth daily before breakfast 30 minutes prior, Disp: 90 tablet, Rfl: 1  •  Multiple Vitamin (MULTIVITAMIN ADULT PO), as directed Orally, Disp: , Rfl:   •  Prenatal Vit-Fe Fumarate-FA (PRENATAL VITAMIN) 27-0.8 MG TABS, Take by mouth, Disp: , Rfl:         Review of Systems:  Review of Systems      Physical Exam:  Physical Exam    This note was completed in part utilizing [employer] Software.  Grammatical errors, random word insertions, spelling mistakes, and incomplete sentences can be an occasional consequence of this system secondary to software limitations, ambient noise, and hardware issues.  If you have any questions or concerns about the content, text, or information contained within the body of this dictation, please contact the provider for clarification.

## 2024-05-01 NOTE — TELEPHONE ENCOUNTER
Cardiology referral was placed.  I also ordered a 48-hour Holter monitor to assess for any arrhythmias or other cardiac abnormalities.

## 2024-05-06 ENCOUNTER — EVALUATION (OUTPATIENT)
Dept: PHYSICAL THERAPY | Facility: CLINIC | Age: 29
End: 2024-05-06
Payer: COMMERCIAL

## 2024-05-06 DIAGNOSIS — G56.03 BILATERAL CARPAL TUNNEL SYNDROME: ICD-10-CM

## 2024-05-06 DIAGNOSIS — M54.12 CERVICAL RADICULOPATHY: ICD-10-CM

## 2024-05-06 PROCEDURE — 97140 MANUAL THERAPY 1/> REGIONS: CPT | Performed by: PHYSICAL THERAPIST

## 2024-05-06 PROCEDURE — 97161 PT EVAL LOW COMPLEX 20 MIN: CPT | Performed by: PHYSICAL THERAPIST

## 2024-05-06 NOTE — PROGRESS NOTES
PT Evaluation     Today's date: 2024  Patient name: Alejandro Lawler  : 1995  MRN: [id number]  Referring provider: Domingo Vasquez CR*  Dx:   Encounter Diagnosis     ICD-10-CM    1. Cervical radiculopathy  M54.12 Ambulatory Referral to Physical Therapy      2. Bilateral carpal tunnel syndrome  G56.03 Ambulatory Referral to Physical Therapy                     Assessment  Assessment details: Pt is a 29 y.o. female presenting to PT with chronic neck pain with B hand paresthesias. PT findings include: strength deficits of postural muscles, cervical neck flexor endurance deficits, B 1st rib elevation/hypomobility and thoracic hypomobility with ROM deficits. Repeated/sustained motions for neck performed which was status quo, flexion potential with some pain relief. Pt with positive neural tension tests. Pt also positive for signs/symptoms of potential cervical radiculopathy affecting carpal tunnel symptoms. Pt educated on PT findings, anatomy, biomechanics, POC and rehab course. Pt will benefit from skilled PT to address above impairments to return to PLOF with less restriction and to reach functional goals.   Impairments: abnormal or restricted ROM, impaired physical strength, lacks appropriate home exercise program, pain with function and poor posture     Symptom irritability: lowUnderstanding of Dx/Px/POC: good   Prognosis: good    Goals  1. Pt will demonstrate understanding of HEP and POC in order to improve compliance with course of rehab in 2 weeks.  2. Pt will demonstrate awareness of appropriate posture with seated, standing and functional dynamic reaching activities in order to decrease excessive loads/pain with ADL's in 3 weeks.   3. Pt's pain will be 2/10 or less to allow pt to return to PLOF with least restriction by d/c.   4. Pt's cervical ROM will improve to limited by only 25% in order for pt to drive with least restriction by d/c.   5. Pt's periscapular strength will improve by at least 1  grade to improve postural endurance with activity by d/c.     Plan  Patient would benefit from: skilled physical therapy  Planned modality interventions: low level laser therapy  Planned therapy interventions: joint mobilization, manual therapy, patient education, postural training, strengthening, stretching, therapeutic activities, therapeutic exercise, home exercise program, functional ROM exercises and flexibility  Frequency: 2x week  Duration in weeks: 8  Treatment plan discussed with: patient    Subjective Evaluation    History of Present Illness  Mechanism of injury: Pt arrives to PT via referral from family medicine for chronic neck pain that occurs at least 1x per month; she reports UT pain in B sides (L>R). She states that she is dealing with carpal tunnel syndrome, now it is worse with numbness/tingling all the time. Pt with diagnosis in the past with carpal tunnel syndrome B. Pt has had injections to the wrist with no symptom improvement in B hands. Pt has pain with movement of the neck. X-rays for the neck was unremarkable.       Quality of life: good    Patient Goals  Patient goals for therapy: decreased pain, increased motion, increased strength and independence with ADLs/IADLs    Pain  Current pain ratin  At best pain ratin  At worst pain rating: 10  Location: neck, B hands  Quality: tight, discomfort and dull ache  Relieving factors: relaxation, rest and support        Objective    Cervical ROM:  WNL all directions    Cervical flexion: relieves symptoms into the hand.     Thoracic mobility:   WNL ext, flex, Rot  Thoracic CPA: painful T1-T4     1st rib elevation B    Special Tests:  Carpal tunnel compression: positive  Carpal tunnel tinels: positive  Phalens: positive    ULTT: positive median nerve, ulnar nerve    Strength:  Rhomboids; 3+/5  Mid trap: 3+/5      Access Code: [email or web address]  URL: [email or web address]  Date: 2024  Prepared by: Rajiv Bain    Exercises  - Doorway Pec  Stretch at 90 Degrees Abduction  - 3 x daily - 7 x weekly - 1 sets - 3 reps - 20 second hold  - Seated Thoracic Lumbar Extension with Pectoralis Stretch  - 3 x daily - 7 x weekly - 1 sets - 3 reps - 20 second hold  - First Rib Mobilization with Strap  - 3 x daily - 7 x weekly - 1 sets - 10 reps - 5 second hold  - Supine Thoracic Mobilization Towel Roll Vertical with Arm Stretch  - 1 x daily - 7 x weekly - 1 sets - 15 reps - 5 second hold  - Standing Isometric Cervical Sidebending with Manual Resistance  - 1 x daily - 7 x weekly - 1 sets - 10 reps - 5 second hold  - Standing Isometric Cervical Flexion with Manual Resistance  - 1 x daily - 7 x weekly - 1 sets - 10 reps - 5 second hold  - Standing Isometric Cervical Extension with Manual Resistance  - 1 x daily - 7 x weekly - 1 sets - 10 reps - 5 second hold       Precautions: None      Manuals 5/6            Cervical SG DL            Thoracic CPA DL            1st rib mob DL            Laser B UT                          Neuro Re-Ed                                                                                                        Ther Ex             Row             Shoulder extension               Chin tuck with overpressure HEP            Pec stretch HEP            Thoracic ext in seated HEP            1st rib mob w/ strap HEP            Scalene stretch w/ strap             Levator stretch             Thoracic foam roll protocol Hugs HEP            Cervical isometric HEP            Prone periscapular lift                                       Ther Activity                                       Gait Training                                       Modalities

## 2024-05-09 ENCOUNTER — APPOINTMENT (OUTPATIENT)
Dept: PHYSICAL THERAPY | Facility: CLINIC | Age: 29
End: 2024-05-09
Payer: COMMERCIAL

## 2024-05-14 ENCOUNTER — OFFICE VISIT (OUTPATIENT)
Dept: PHYSICAL THERAPY | Facility: CLINIC | Age: 29
End: 2024-05-14
Payer: COMMERCIAL

## 2024-05-14 DIAGNOSIS — M54.12 CERVICAL RADICULOPATHY: Primary | ICD-10-CM

## 2024-05-14 DIAGNOSIS — G56.03 BILATERAL CARPAL TUNNEL SYNDROME: ICD-10-CM

## 2024-05-14 PROCEDURE — 97140 MANUAL THERAPY 1/> REGIONS: CPT

## 2024-05-14 PROCEDURE — 97110 THERAPEUTIC EXERCISES: CPT

## 2024-05-14 NOTE — PROGRESS NOTES
"Daily Note     Today's date: 2024  Patient name: Alejandro Lawler  : 1995  MRN: [id number]  Referring provider: Domingo Vasquez CR*  Dx:   Encounter Diagnosis     ICD-10-CM    1. Cervical radiculopathy  M54.12       2. Bilateral carpal tunnel syndrome  G56.03                      Subjective: Pt reports she has not been feeling as tight, and has been HA free.      Objective: See treatment diary below      Assessment: Reviewed/performed HEP. New exercises as arabella, see below. Tightness noted during thoracic CPA. Added laser to B UT areas. Tolerated treatment well. Issued RTB and updated written HEP instructions, reviewed same w/pt. Will monitor. Progress NV.      Plan: Continue per plan of care.      Precautions: None      Manuals            Cervical SG DL FH           Thoracic CPA DL FH           1st rib mob DL FH           Laser B UT  10W 4'                        Neuro Re-Ed                                                                                                        Ther Ex             Row  Red 1x10  1x7           Shoulder extension    Red 2x10           Chin tuck with overpressure HEP 5\"x10           Pec stretch HEP 20\"x3           Thoracic ext in seated HEP 20\"x3           1st rib mob w/ strap HEP 5\"x10           Scalene stretch w/ strap             Levator stretch             Thoracic foam roll protocol Hugs HEP 5\"x10           Cervical isometric HEP 5\"x10 ea           Prone periscapular lift                                       Ther Activity                                       Gait Training                                       Modalities                                            "

## 2024-05-16 ENCOUNTER — APPOINTMENT (OUTPATIENT)
Dept: PHYSICAL THERAPY | Facility: CLINIC | Age: 29
End: 2024-05-16
Payer: COMMERCIAL

## 2024-05-20 DIAGNOSIS — E88.819 INSULIN RESISTANCE: ICD-10-CM

## 2024-05-20 DIAGNOSIS — R63.5 WEIGHT GAIN: Primary | ICD-10-CM

## 2024-05-21 ENCOUNTER — OFFICE VISIT (OUTPATIENT)
Dept: PHYSICAL THERAPY | Facility: CLINIC | Age: 29
End: 2024-05-21
Payer: COMMERCIAL

## 2024-05-21 DIAGNOSIS — M54.12 CERVICAL RADICULOPATHY: Primary | ICD-10-CM

## 2024-05-21 DIAGNOSIS — G56.03 BILATERAL CARPAL TUNNEL SYNDROME: ICD-10-CM

## 2024-05-21 PROCEDURE — 97140 MANUAL THERAPY 1/> REGIONS: CPT

## 2024-05-21 PROCEDURE — 97110 THERAPEUTIC EXERCISES: CPT

## 2024-05-21 NOTE — PROGRESS NOTES
"5/Daily Note     Today's date: 2024  Patient name: Alejandro Lawler  : 1995  MRN: [id number]  Referring provider: Domingo Vasquez CR*  Dx:   Encounter Diagnosis     ICD-10-CM    1. Cervical radiculopathy  M54.12       2. Bilateral carpal tunnel syndrome  G56.03                      Subjective: Pt cont to note improvement.      Objective: See treatment diary below      Assessment: Performed new exercises and remaining ex program w/o complaint. Comfortable during laser. Responded well to mobilizations. Issued updated written HEP instructions, reviewed same w/pt. Tolerated treatment well. Patient would benefit from continued PT, progress as arabella.      Plan: Continue per plan of care.      Precautions: None      Manuals           Cervical SG DL FH DL          Thoracic CPA DL FH DL          1st rib mob DL FH DL          Laser B UT  10W 4' 10W 4'                       Neuro Re-Ed                                                                                                        Ther Ex             Row  Red 1x10  1x7 Red 2x10          Shoulder extension    Red 2x10 Red 2x10          Chin tuck with overpressure HEP 5\"x10 5\"x10          Pec stretch HEP 20\"x3 20\"x3          Thoracic ext in seated HEP 20\"x3 20\"x3          1st rib mob w/ strap HEP 5\"x10 5\"x10          Scalene stretch w/ strap   5\"x10  ea          Levator stretch   3x20\" ea          Thoracic foam roll protocol Hugs HEP 5\"x10 5\"x15          Cervical isometric HEP 5\"x10 ea 5\"x10 ea          Prone periscapular lift                                       Ther Activity                                       Gait Training                                       Modalities                                              "

## 2024-05-23 DIAGNOSIS — E03.8 HYPOTHYROIDISM DUE TO HASHIMOTO'S THYROIDITIS: ICD-10-CM

## 2024-05-23 DIAGNOSIS — E06.3 HYPOTHYROIDISM DUE TO HASHIMOTO'S THYROIDITIS: ICD-10-CM

## 2024-05-23 RX ORDER — LEVOTHYROXINE SODIUM 0.1 MG/1
100 TABLET ORAL
Qty: 30 TABLET | Refills: 0 | Status: SHIPPED | OUTPATIENT
Start: 2024-05-23 | End: 2024-05-30

## 2024-05-24 ENCOUNTER — HOSPITAL ENCOUNTER (OUTPATIENT)
Dept: NON INVASIVE DIAGNOSTICS | Facility: CLINIC | Age: 29
Discharge: HOME | End: 2024-05-24
Payer: COMMERCIAL

## 2024-05-24 VITALS
DIASTOLIC BLOOD PRESSURE: 62 MMHG | BODY MASS INDEX: 31.72 KG/M2 | WEIGHT: 168 LBS | HEART RATE: 71 BPM | SYSTOLIC BLOOD PRESSURE: 112 MMHG | HEIGHT: 61 IN

## 2024-05-24 VITALS
OXYGEN SATURATION: 99 % | HEART RATE: 77 BPM | SYSTOLIC BLOOD PRESSURE: 112 MMHG | WEIGHT: 168 LBS | DIASTOLIC BLOOD PRESSURE: 72 MMHG | HEIGHT: 61 IN | BODY MASS INDEX: 31.72 KG/M2

## 2024-05-24 DIAGNOSIS — R00.2 PALPITATIONS: ICD-10-CM

## 2024-05-24 DIAGNOSIS — R07.9 LEFT-SIDED CHEST PAIN: ICD-10-CM

## 2024-05-24 LAB
AORTIC ROOT: 2.4 CM
APICAL FOUR CHAMBER EJECTION FRACTION: 65 %
ASCENDING AORTA: 2.7 CM
BSA FOR ECHO PROCEDURE: 1.75 M2
E WAVE DECELERATION TIME: 236 MS
E/A RATIO: 1.83
FRACTIONAL SHORTENING: 34 (ref 28–44)
INTERVENTRICULAR SEPTUM IN DIASTOLE (PARASTERNAL SHORT AXIS VIEW): 0.7 CM
INTERVENTRICULAR SEPTUM: 0.7 CM (ref 0.6–1.1)
LAAS-AP2: 11 CM2
LAAS-AP4: 15.1 CM2
LEFT ATRIUM SIZE: 3 CM
LEFT ATRIUM VOLUME (MOD BIPLANE): 31 ML
LEFT ATRIUM VOLUME INDEX (MOD BIPLANE): 17.7 ML/M2
LEFT INTERNAL DIMENSION IN SYSTOLE: 2.9 CM (ref 2.1–4)
LEFT VENTRICULAR INTERNAL DIMENSION IN DIASTOLE: 4.4 CM (ref 3.5–6)
LEFT VENTRICULAR POSTERIOR WALL IN END DIASTOLE: 1 CM
LEFT VENTRICULAR STROKE VOLUME: 53 ML
LVSV (TEICH): 53 ML
MAX HR PERCENT: 87 %
MAX HR: 166 BPM
MV E'TISSUE VEL-SEP: 14 CM/S
MV PEAK A VEL: 0.36 M/S
MV PEAK E VEL: 66 CM/S
MV STENOSIS PRESSURE HALF TIME: 68 MS
MV VALVE AREA P 1/2 METHOD: 3.24
RATE PRESSURE PRODUCT: NORMAL
RIGHT ATRIUM AREA SYSTOLE A4C: 11.4 CM2
RIGHT VENTRICLE ID DIMENSION: 3.3 CM
SL CV LEFT ATRIUM LENGTH A2C: 4.3 CM
SL CV LV EF: 65
SL CV PED ECHO LEFT VENTRICLE DIASTOLIC VOLUME (MOD BIPLANE) 2D: 86 ML
SL CV PED ECHO LEFT VENTRICLE SYSTOLIC VOLUME (MOD BIPLANE) 2D: 33 ML
SL CV STRESS RECOVERY BP: NORMAL MMHG
SL CV STRESS RECOVERY HR: 84 BPM
SL CV STRESS RECOVERY O2 SAT: 99 %
SL CV STRESS STAGE REACHED: 4
STRESS ANGINA INDEX: 1
STRESS BASELINE BP: NORMAL MMHG
STRESS BASELINE HR: 77 BPM
STRESS DUKE TREADMILL SCORE: 6
STRESS O2 SAT REST: 99 %
STRESS PEAK HR: 140 BPM
STRESS POST ESTIMATED WORKLOAD: 11.7 METS
STRESS POST EXERCISE DUR MIN: 9 MIN
STRESS POST EXERCISE DUR SEC: 30 SEC
STRESS POST O2 SAT PEAK: 99 %
STRESS POST PEAK BP: 164 MMHG
STRESS ST DEPRESSION: 0 MM
TR MAX PG: 14 MMHG
TR PEAK VELOCITY: 1.9 M/S
TRICUSPID ANNULAR PLANE SYSTOLIC EXCURSION: 1.8 CM
TRICUSPID VALVE PEAK REGURGITATION VELOCITY: 1.86 M/S

## 2024-05-24 PROCEDURE — 93225 XTRNL ECG REC<48 HRS REC: CPT

## 2024-05-24 PROCEDURE — 93226 XTRNL ECG REC<48 HR SCAN A/R: CPT

## 2024-05-24 PROCEDURE — 93016 CV STRESS TEST SUPVJ ONLY: CPT | Performed by: INTERNAL MEDICINE

## 2024-05-24 PROCEDURE — 93306 TTE W/DOPPLER COMPLETE: CPT

## 2024-05-24 PROCEDURE — 93018 CV STRESS TEST I&R ONLY: CPT | Performed by: INTERNAL MEDICINE

## 2024-05-24 PROCEDURE — 93306 TTE W/DOPPLER COMPLETE: CPT | Performed by: INTERNAL MEDICINE

## 2024-05-24 PROCEDURE — 93017 CV STRESS TEST TRACING ONLY: CPT

## 2024-05-25 LAB
CHEST PAIN STATEMENT: NORMAL
MAX DIASTOLIC BP: 80 MMHG
MAX PREDICTED HEART RATE: 189 BPM
PROTOCOL NAME: NORMAL
STRESS POST EXERCISE DUR MIN: 9 MIN
STRESS POST EXERCISE DUR SEC: 30 SEC
STRESS POST PEAK HR: 166 BPM
STRESS POST PEAK SYSTOLIC BP: 140 MMHG
TARGET HR FORMULA: NORMAL
TEST INDICATION: NORMAL

## 2024-05-28 ENCOUNTER — APPOINTMENT (OUTPATIENT)
Dept: PHYSICAL THERAPY | Facility: CLINIC | Age: 29
End: 2024-05-28
Payer: COMMERCIAL

## 2024-05-30 ENCOUNTER — OFFICE VISIT (OUTPATIENT)
Dept: OBGYN CLINIC | Facility: CLINIC | Age: 29
End: 2024-05-30
Payer: COMMERCIAL

## 2024-05-30 VITALS
WEIGHT: 168 LBS | HEIGHT: 61 IN | SYSTOLIC BLOOD PRESSURE: 122 MMHG | BODY MASS INDEX: 31.72 KG/M2 | DIASTOLIC BLOOD PRESSURE: 88 MMHG

## 2024-05-30 DIAGNOSIS — E03.8 HYPOTHYROIDISM DUE TO HASHIMOTO'S THYROIDITIS: Primary | ICD-10-CM

## 2024-05-30 DIAGNOSIS — G56.23 CUBITAL TUNNEL SYNDROME, BILATERAL: ICD-10-CM

## 2024-05-30 DIAGNOSIS — E06.3 HYPOTHYROIDISM DUE TO HASHIMOTO'S THYROIDITIS: Primary | ICD-10-CM

## 2024-05-30 DIAGNOSIS — G56.21 CUBITAL TUNNEL SYNDROME ON RIGHT: ICD-10-CM

## 2024-05-30 DIAGNOSIS — G56.22 CUBITAL TUNNEL SYNDROME ON LEFT: ICD-10-CM

## 2024-05-30 DIAGNOSIS — G56.03 BILATERAL CARPAL TUNNEL SYNDROME: Primary | ICD-10-CM

## 2024-05-30 LAB
ALBUMIN SERPL-MCNC: 4.4 G/DL (ref 3.9–4.9)
ALBUMIN/GLOB SERPL: 1.5 {RATIO} (ref 1.2–2.2)
ALP SERPL-CCNC: 73 IU/L (ref 44–121)
ALT SERPL-CCNC: 50 IU/L (ref 0–32)
AST SERPL-CCNC: 21 IU/L (ref 0–40)
BILIRUB SERPL-MCNC: 0.2 MG/DL (ref 0–1.2)
BUN SERPL-MCNC: 13 MG/DL (ref 6–20)
BUN/CREAT SERPL: 17 (ref 9–23)
C PEPTIDE SERPL-MCNC: 3.6 NG/ML (ref 1.1–4.4)
CALCIUM SERPL-MCNC: 9.5 MG/DL (ref 8.7–10.2)
CHLORIDE SERPL-SCNC: 103 MMOL/L (ref 96–106)
CO2 SERPL-SCNC: 23 MMOL/L (ref 20–29)
CREAT SERPL-MCNC: 0.75 MG/DL (ref 0.57–1)
EGFR: 109 ML/MIN/1.73
EST. AVERAGE GLUCOSE BLD GHB EST-MCNC: 114 MG/DL
GLOBULIN SER-MCNC: 2.9 G/DL (ref 1.5–4.5)
GLUCOSE SERPL-MCNC: 91 MG/DL (ref 70–99)
HBA1C MFR BLD: 5.6 % (ref 4.8–5.6)
POTASSIUM SERPL-SCNC: 4.6 MMOL/L (ref 3.5–5.2)
PROT SERPL-MCNC: 7.3 G/DL (ref 6–8.5)
SODIUM SERPL-SCNC: 141 MMOL/L (ref 134–144)
T4 SERPL-MCNC: 4.7 UG/DL (ref 4.5–12)
TSH SERPL DL<=0.005 MIU/L-ACNC: 5.31 UIU/ML (ref 0.45–4.5)

## 2024-05-30 PROCEDURE — 99203 OFFICE O/P NEW LOW 30 MIN: CPT | Performed by: SURGERY

## 2024-05-30 RX ORDER — LEVOTHYROXINE SODIUM 0.12 MG/1
125 TABLET ORAL
Qty: 30 TABLET | Refills: 5 | Status: SHIPPED | OUTPATIENT
Start: 2024-05-30

## 2024-05-30 NOTE — PROGRESS NOTES
Assessment    Bilateral carpal and cubital tunnel syndrome   Bilateral FCR tendonitis  Chronic neck pain      Plan    Order ultrasound MSK bilateral wrists and elbows to further evaluate and confirm the diagnosis.  Activities as tolerated.  Follow-up after studies are completed to discuss next step in treatment plan including surgical intervention.  All questions answered.        Subjective     HPI    Patient ID:  Alejandro Lawler is a right hand dominant 29 y.o. female here for evaluation of the bilateral upper extremities.  According to the patient, she has a several year history of bilateral volar wrist and hand shooting pain described as sharp, with associated whole hand numbness and tingling that has worsened recently.  It is now associated with  strength weakness.  She has been seen and treated at outside facility with steroid injections, therapy, and bracing which all have not provided lasting relief.  There is no injury or trauma to the area.  Recently her therapist recommended she see hand surgery for further treatment options.  She states she did have an EMG of the bilateral upper extremities 2021 which indicated bilateral carpal tunnel syndrome, and right cubital tunnel syndrome.  She also has persistent chronic neck pain.  She has no history of surgery to either upper extremity.      The following portions of the patient's history were reviewed and updated as appropriate: allergies, current medications, past family history, past medical history, past social history, past surgical history, and problem list.    Review of Systems     Objective    Imaging:  None     Physical Exam     Vitals:    05/30/24 0810   BP: 122/88     General appearance:  NAD   Cardiac:  Regular rate  Lungs:  Unlabored breathing  Abdomen:  Non-distended    Orthopedic Examination:  Bilateral upper extremity    Inspection: No open wounds or erythema.  No ecchymosis or swelling.  No muscle wasting.    Palpation: Nontender to  palpation first dorsal extensor compartments bilaterally.  Mild tenderness FCR right wrist.    Range-of-motion: Normal elbow wrist finger range of motion.  No palpable subluxation of the ulnar nerve with elbow range of motion.    Strength: 5/5 wrist flexion extension, , intrinsics, 4/5 thumb opposition, APB bilaterally.    Sensation: Intact to light touch median radial ulnar nerve distribution bilaterally    Special Tests: Positive Tinel's and Durkan's compression bilateral wrists and elbows.  Palpable radial pulse bilaterally  The upper extremities are warm and well-perfused.

## 2024-06-04 ENCOUNTER — OFFICE VISIT (OUTPATIENT)
Dept: FAMILY MEDICINE CLINIC | Facility: CLINIC | Age: 29
End: 2024-06-04
Payer: COMMERCIAL

## 2024-06-04 ENCOUNTER — OFFICE VISIT (OUTPATIENT)
Dept: PHYSICAL THERAPY | Facility: CLINIC | Age: 29
End: 2024-06-04
Payer: COMMERCIAL

## 2024-06-04 VITALS
SYSTOLIC BLOOD PRESSURE: 120 MMHG | HEIGHT: 61 IN | DIASTOLIC BLOOD PRESSURE: 64 MMHG | WEIGHT: 171 LBS | HEART RATE: 95 BPM | OXYGEN SATURATION: 96 % | BODY MASS INDEX: 32.28 KG/M2

## 2024-06-04 DIAGNOSIS — L74.0 HEAT RASH: ICD-10-CM

## 2024-06-04 DIAGNOSIS — M54.12 CERVICAL RADICULOPATHY: Primary | ICD-10-CM

## 2024-06-04 DIAGNOSIS — E06.3 HYPOTHYROIDISM DUE TO HASHIMOTO'S THYROIDITIS: ICD-10-CM

## 2024-06-04 DIAGNOSIS — E03.8 HYPOTHYROIDISM DUE TO HASHIMOTO'S THYROIDITIS: ICD-10-CM

## 2024-06-04 DIAGNOSIS — Z12.31 ENCOUNTER FOR SCREENING MAMMOGRAM FOR MALIGNANT NEOPLASM OF BREAST: ICD-10-CM

## 2024-06-04 DIAGNOSIS — E66.09 CLASS 1 OBESITY DUE TO EXCESS CALORIES WITHOUT SERIOUS COMORBIDITY WITH BODY MASS INDEX (BMI) OF 32.0 TO 32.9 IN ADULT: Primary | ICD-10-CM

## 2024-06-04 DIAGNOSIS — G56.03 BILATERAL CARPAL TUNNEL SYNDROME: ICD-10-CM

## 2024-06-04 PROBLEM — J34.2 DNS (DEVIATED NASAL SEPTUM): Status: ACTIVE | Noted: 2024-06-04

## 2024-06-04 PROBLEM — H69.90 ETD (EUSTACHIAN TUBE DYSFUNCTION): Status: ACTIVE | Noted: 2024-06-04

## 2024-06-04 PROBLEM — E66.811 CLASS 1 OBESITY DUE TO EXCESS CALORIES WITHOUT SERIOUS COMORBIDITY WITH BODY MASS INDEX (BMI) OF 32.0 TO 32.9 IN ADULT: Status: ACTIVE | Noted: 2024-06-04

## 2024-06-04 PROBLEM — J30.9 AR (ALLERGIC RHINITIS): Status: ACTIVE | Noted: 2024-06-04

## 2024-06-04 PROBLEM — M54.2 NECK PAIN: Status: RESOLVED | Noted: 2023-02-15 | Resolved: 2024-06-04

## 2024-06-04 PROBLEM — H90.3 ASNHL (ASYMMETRICAL SENSORINEURAL HEARING LOSS): Status: ACTIVE | Noted: 2024-06-04

## 2024-06-04 PROCEDURE — 99214 OFFICE O/P EST MOD 30 MIN: CPT | Performed by: NURSE PRACTITIONER

## 2024-06-04 PROCEDURE — 97110 THERAPEUTIC EXERCISES: CPT | Performed by: PHYSICAL THERAPIST

## 2024-06-04 PROCEDURE — 97140 MANUAL THERAPY 1/> REGIONS: CPT | Performed by: PHYSICAL THERAPIST

## 2024-06-04 RX ORDER — TRIAMCINOLONE ACETONIDE 1 MG/G
CREAM TOPICAL 2 TIMES DAILY
Qty: 45 G | Refills: 1 | Status: SHIPPED | OUTPATIENT
Start: 2024-06-04

## 2024-06-04 NOTE — ASSESSMENT & PLAN NOTE
Patient will be trialed on metformin 500 mg daily to help with weight loss as this medication is safe to take while breast-feeding.  I also reinforced the importance of limiting sugar and carbohydrates in the patient's diet and regular exercise.  I will have patient return to the office in 3 months so that her weight can be reassessed.

## 2024-06-04 NOTE — ASSESSMENT & PLAN NOTE
Kenalog cream was ordered to be used twice daily as needed for treatment of heat rash.  I did advise patient that she should not use the cream on her face.  Dermatology referral was also placed.

## 2024-06-04 NOTE — PROGRESS NOTES
"Daily Note     Today's date: 2024  Patient name: Alejandro Lawler  : 1995  MRN: [id number]  Referring provider: Domingo Vasquez CR*  Dx:   Encounter Diagnosis     ICD-10-CM    1. Cervical radiculopathy  M54.12       2. Bilateral carpal tunnel syndrome  G56.03                      Subjective: Pt reports that neck feels looser, continues with numbness/tingling into the hands. She is scheduled for US next week Monday, she has consulted with hand surgeon.       Objective: See treatment diary below      Assessment: Pt tolerates treatment well today with no significant complaints. Pt does have some CTJ pain therefore added mobilizations in prone. Pt will benefit from continued skilled PT.       Plan: Continue per plan of care.      Precautions: None      Manuals          Cervical SG DL FH DL DL         Thoracic CPA DL FH DL DL         1st rib mob DL FH DL DL         CTJ mobilization    Prone DL B         Laser B UT  10W 4' 10W 4' 10W 4'                       Neuro Re-Ed                                                                                                        Ther Ex             Row  Red 1x10  1x7 Red 2x10 Red 2x10         Shoulder extension    Red 2x10 Red 2x10 Red 2x10         Chin tuck with overpressure HEP 5\"x10 5\"x10 5\"x10          Pec stretch HEP 20\"x3 20\"x3 20\"x3         Thoracic ext in seated HEP 20\"x3 20\"x3 20\"x3         1st rib mob w/ strap HEP 5\"x10 5\"x10 5\"x10         Scalene stretch w/ strap   5\"x10  ea 5\"x10 ea         Levator stretch   3x20\" ea 3x20\" ea.          Thoracic foam roll protocol Hugs HEP 5\"x10 5\"x15 5\"x15         Cervical isometric HEP 5\"x10 ea 5\"x10 ea 5\"x10 ea.          Prone periscapular lift                                       Ther Activity                                       Gait Training                                       Modalities                                                "

## 2024-06-04 NOTE — PROGRESS NOTES
Ambulatory Visit  Name: Alejandro Lawler      : 1995      MRN: [id number]  Encounter Provider: DALLAS Dyer  Encounter Date: 2024   Encounter department: [facility]    Assessment & Plan   1. Class 1 obesity due to excess calories without serious comorbidity with body mass index (BMI) of 32.0 to 32.9 in adult  Assessment & Plan:  Patient will be trialed on metformin 500 mg daily to help with weight loss as this medication is safe to take while breast-feeding.  I also reinforced the importance of limiting sugar and carbohydrates in the patient's diet and regular exercise.  I will have patient return to the office in 3 months so that her weight can be reassessed.  Orders:  -     metFORMIN (GLUCOPHAGE) 500 mg tablet; Take 1 tablet (500 mg total) by mouth daily with breakfast  2. Encounter for screening mammogram for malignant neoplasm of breast  Assessment & Plan:  I did advise patient that I am unsure if her insurance will cover a mammogram at this age as she does not have a family history of breast cancer however, I did still place the order for screening mammogram for her.  Orders:  -     Mammo screening bilateral w 3d & cad; Future  3. Heat rash  Assessment & Plan:  Kenalog cream was ordered to be used twice daily as needed for treatment of heat rash.  I did advise patient that she should not use the cream on her face.  Dermatology referral was also placed.  Orders:  -     triamcinolone (KENALOG) 0.1 % cream; Apply topically 2 (two) times a day  -     Ambulatory Referral to Dermatology; Future  4. Hypothyroidism due to Hashimoto's thyroiditis  Assessment & Plan:  Repeat TSH level was ordered to be completed prior to patient's next office visit.      Depression Screening and Follow-up Plan: Patient was screened for depression during today's encounter. They screened negative with a PHQ-2 score of 0.      History of Present Illness   {Disappearing Hyperlinks I Encounters *  My Last Note * Since Last Visit * History :28926}  Weight gain: Patient reports that recently she has been noting increased weight gain.  Patient is interested in trialing a medication to help with weight loss.  Of note, the patient is currently breast-feeding.    Hypothyroidism: Patient's most recent TSH level was slightly elevated however, her T4 level was normal.  Patient's levothyroxine dosage was recently increased to 125 mcg daily.  The patient does report that she has been noting weight gain recently.    Screen for breast cancer: Patient was requesting that a mammogram be ordered.  I did inform the patient that usually breast cancer screening will start at the age of 35 and less than the patient has a first-degree relative with a history of breast cancer.  The patient does not have any history of breast cancer in her family that she knows of.    Dermatitis: Patient reports that recently she has been spending more time in the sun and has noted a rash on her bilateral arms, chest, and face.  She does report that she does have associated pruritus but she denies any drainage from the affected areas.            Review of Systems   Constitutional:  Positive for unexpected weight change (weight gain). Negative for chills and fever.   HENT:  Negative for ear pain and sore throat.    Eyes:  Negative for pain and visual disturbance.   Respiratory:  Negative for cough, chest tightness, shortness of breath and wheezing.    Cardiovascular:  Negative for chest pain, palpitations and leg swelling.   Gastrointestinal:  Negative for abdominal pain, constipation, diarrhea, nausea and vomiting.   Endocrine: Negative for cold intolerance and heat intolerance.   Genitourinary:  Negative for decreased urine volume, dysuria and hematuria.   Musculoskeletal:  Negative for arthralgias, back pain and myalgias.   Skin:  Positive for rash (BUE and chest). Negative for color change.   Allergic/Immunologic: Negative for environmental  "allergies.   Neurological:  Negative for dizziness, seizures, syncope, weakness, light-headedness, numbness and headaches.   Hematological:  Negative for adenopathy.   Psychiatric/Behavioral:  Negative for confusion. The patient is not nervous/anxious.    All other systems reviewed and are negative.      Objective   {Disappearing Hyperlinks   Review Vitals * Enter New Vitals * Results Review * Labs * Imaging * Cardiology * Procedures * Lung Cancer Screening :48150}  /64 (BP Location: Left arm, Patient Position: Sitting, Cuff Size: Standard)   Pulse 95   Ht 5' 1\" (1.549 m)   Wt 77.6 kg (171 lb)   SpO2 96%   BMI 32.31 kg/m²     Physical Exam  Vitals and nursing note reviewed.   Constitutional:       General: She is not in acute distress.     Appearance: Normal appearance. She is well-developed. She is not ill-appearing.   HENT:      Head: Normocephalic.   Eyes:      Conjunctiva/sclera: Conjunctivae normal.   Cardiovascular:      Rate and Rhythm: Normal rate and regular rhythm.      Pulses: Normal pulses.           Carotid pulses are 2+ on the right side and 2+ on the left side.       Radial pulses are 2+ on the right side and 2+ on the left side.        Posterior tibial pulses are 2+ on the right side and 2+ on the left side.      Heart sounds: Normal heart sounds. No murmur heard.  Pulmonary:      Effort: Pulmonary effort is normal. No respiratory distress.      Breath sounds: Normal breath sounds. No decreased breath sounds, wheezing, rhonchi or rales.   Abdominal:      General: Abdomen is flat. Bowel sounds are normal. There is no distension.      Palpations: Abdomen is soft.      Tenderness: There is no abdominal tenderness. There is no guarding.   Musculoskeletal:         General: No swelling. Normal range of motion.      Cervical back: Normal range of motion and neck supple.      Right lower leg: No edema.      Left lower leg: No edema.   Skin:     General: Skin is warm and dry.      Capillary " Refill: Capillary refill takes less than 2 seconds.      Findings: Rash present. Rash is papular.      Comments: Scattered papules noted on the patient's bilateral upper extremities, chest, and face.   Neurological:      General: No focal deficit present.      Mental Status: She is alert and oriented to person, place, and time.   Psychiatric:         Mood and Affect: Mood normal.         Behavior: Behavior normal.         Thought Content: Thought content normal.         Judgment: Judgment normal.       Administrative Statements {Disappearing Hyperlinks I  Level of Service * [facility]}

## 2024-06-04 NOTE — ASSESSMENT & PLAN NOTE
I did advise patient that I am unsure if her insurance will cover a mammogram at this age as she does not have a family history of breast cancer however, I did still place the order for screening mammogram for her.

## 2024-06-10 ENCOUNTER — HOSPITAL ENCOUNTER (OUTPATIENT)
Dept: ULTRASOUND IMAGING | Facility: HOSPITAL | Age: 29
Discharge: HOME | End: 2024-06-10
Payer: COMMERCIAL

## 2024-06-10 DIAGNOSIS — G56.23 CUBITAL TUNNEL SYNDROME, BILATERAL: ICD-10-CM

## 2024-06-10 DIAGNOSIS — G56.03 BILATERAL CARPAL TUNNEL SYNDROME: ICD-10-CM

## 2024-06-10 PROCEDURE — 76882 US LMTD JT/FCL EVL NVASC XTR: CPT

## 2024-06-11 ENCOUNTER — APPOINTMENT (OUTPATIENT)
Dept: PHYSICAL THERAPY | Facility: CLINIC | Age: 29
End: 2024-06-11
Payer: COMMERCIAL

## 2024-06-18 ENCOUNTER — APPOINTMENT (OUTPATIENT)
Dept: PHYSICAL THERAPY | Facility: CLINIC | Age: 29
End: 2024-06-18
Payer: COMMERCIAL

## 2024-06-22 DIAGNOSIS — E03.8 HYPOTHYROIDISM DUE TO HASHIMOTO'S THYROIDITIS: ICD-10-CM

## 2024-06-22 DIAGNOSIS — E06.3 HYPOTHYROIDISM DUE TO HASHIMOTO'S THYROIDITIS: ICD-10-CM

## 2024-06-22 RX ORDER — LEVOTHYROXINE SODIUM 0.12 MG/1
125 TABLET ORAL
Qty: 90 TABLET | Refills: 1 | Status: SHIPPED | OUTPATIENT
Start: 2024-06-22

## 2024-06-25 ENCOUNTER — APPOINTMENT (OUTPATIENT)
Dept: PHYSICAL THERAPY | Facility: CLINIC | Age: 29
End: 2024-06-25
Payer: COMMERCIAL

## 2024-06-26 ENCOUNTER — OFFICE VISIT (OUTPATIENT)
Dept: OBGYN CLINIC | Facility: CLINIC | Age: 29
End: 2024-06-26
Payer: COMMERCIAL

## 2024-06-26 VITALS
BODY MASS INDEX: 32.28 KG/M2 | DIASTOLIC BLOOD PRESSURE: 58 MMHG | SYSTOLIC BLOOD PRESSURE: 117 MMHG | WEIGHT: 171 LBS | HEIGHT: 61 IN

## 2024-06-26 DIAGNOSIS — G56.22 CUBITAL TUNNEL SYNDROME ON LEFT: ICD-10-CM

## 2024-06-26 DIAGNOSIS — M77.8 FLEXOR CARPI RADIALIS TENDINITIS: ICD-10-CM

## 2024-06-26 DIAGNOSIS — G56.21 CUBITAL TUNNEL SYNDROME ON RIGHT: ICD-10-CM

## 2024-06-26 DIAGNOSIS — G56.03 BILATERAL CARPAL TUNNEL SYNDROME: Primary | ICD-10-CM

## 2024-06-26 PROCEDURE — 20550 NJX 1 TENDON SHEATH/LIGAMENT: CPT | Performed by: SURGERY

## 2024-06-26 PROCEDURE — 99213 OFFICE O/P EST LOW 20 MIN: CPT | Performed by: SURGERY

## 2024-06-26 RX ORDER — BETAMETHASONE SODIUM PHOSPHATE AND BETAMETHASONE ACETATE 3; 3 MG/ML; MG/ML
6 INJECTION, SUSPENSION INTRA-ARTICULAR; INTRALESIONAL; INTRAMUSCULAR; SOFT TISSUE
Status: COMPLETED | OUTPATIENT
Start: 2024-06-26 | End: 2024-06-26

## 2024-06-26 RX ORDER — LIDOCAINE HYDROCHLORIDE 10 MG/ML
3 INJECTION, SOLUTION INFILTRATION; PERINEURAL
Status: COMPLETED | OUTPATIENT
Start: 2024-06-26 | End: 2024-06-26

## 2024-06-26 RX ADMIN — LIDOCAINE HYDROCHLORIDE 3 ML: 10 INJECTION, SOLUTION INFILTRATION; PERINEURAL at 11:45

## 2024-06-26 RX ADMIN — BETAMETHASONE SODIUM PHOSPHATE AND BETAMETHASONE ACETATE 6 MG: 3; 3 INJECTION, SUSPENSION INTRA-ARTICULAR; INTRALESIONAL; INTRAMUSCULAR; SOFT TISSUE at 11:45

## 2024-06-26 NOTE — PROGRESS NOTES
Assessment    Bilateral carpal and Left cubital tunnel syndrome   Bilateral FCR tendonitis  Chronic neck pain      Plan    Ultrasounds were reviewed and results discussed with the patient.  Surgery was discussed as well a recovery time and restrictions  We discussed injections into her FCR for pain relief today  She would like to have Carpal Tunnel Release and Left Cubital Tunnel Release at a later date due to lifting restrictions. She states her son is getting heavy to  and she would like to wait until he can walk to get the surgery  NSAIDs as needed for post injection discomfort        Subjective     HPI    Patient ID:  Alejandro Lawler is a right hand dominant 29 y.o. female here for Follow up of the bilateral upper extremities.  She states she still has 3-4/10 pain in her wrists as well as hand numbness. She got her Ultrasounds of her Bilateral UE and is here to discuss the results       The following portions of the patient's history were reviewed and updated as appropriate: allergies, current medications, past family history, past medical history, past social history, past surgical history, and problem list.    Review of Systems     Objective    Imaging:  None     Physical Exam     Vitals:    06/26/24 1151   BP: 117/58       General appearance:  NAD   Cardiac:  Regular rate  Lungs:  Unlabored breathing  Abdomen:  Non-distended    Orthopedic Examination:  Bilateral upper extremity    Inspection: No open wounds or erythema.  No ecchymosis or swelling.  No muscle wasting.    Palpation: Nontender to palpation first dorsal extensor compartments bilaterally.  Mild tenderness FCR right wrist.    Range-of-motion: Normal elbow wrist finger range of motion.  No palpable subluxation of the ulnar nerve with elbow range of motion.    Strength: 5/5 wrist flexion extension, , intrinsics, 4/5 thumb opposition, APB bilaterally.    Sensation: Intact to light touch median radial ulnar nerve distribution  bilaterally    Special Tests: Positive Tinel's and Durkan's compression bilateral wrists and elbows.  Palpable radial pulse bilaterally  The upper extremities are warm and well-perfused.      Hand/upper extremity injection: R wrist  Universal Protocol:  Consent: Verbal consent obtained.  Risks and benefits: risks, benefits and alternatives were discussed  Consent given by: patient  Timeout called at: 6/26/2024 12:06 PM.  Patient understanding: patient states understanding of the procedure being performed  Site marked: the operative site was marked  Patient identity confirmed: verbally with patient  Supporting Documentation  Indications: pain and tendon swelling   Procedure Details  Condition:tendonitis Condition comment: FCR   Site: R wrist   Needle size: 22 G  Ultrasound guidance: no  Approach: volar  Medications administered: 3 mL lidocaine 1 %; 6 mg betamethasone acetate-betamethasone sodium phosphate 6 (3-3) mg/mL  Patient tolerance: patient tolerated the procedure well with no immediate complications  Dressing:  Sterile dressing applied       Hand/upper extremity injection: L wrist  Universal Protocol:  Consent: Verbal consent obtained.  Risks and benefits: risks, benefits and alternatives were discussed  Consent given by: patient  Timeout called at: 6/26/2024 12:06 PM.  Patient understanding: patient states understanding of the procedure being performed  Site marked: the operative site was marked  Patient identity confirmed: verbally with patient  Supporting Documentation  Indications: pain and tendon swelling   Procedure Details  Condition:tendonitis Condition comment: FCR   Site: L wrist   Needle size: 22 G  Ultrasound guidance: no  Approach: ulnar  Medications administered: 3 mL lidocaine 1 %; 6 mg betamethasone acetate-betamethasone sodium phosphate 6 (3-3) mg/mL  Patient tolerance: patient tolerated the procedure well with no immediate complications  Dressing:  Sterile dressing applied

## 2024-06-29 DIAGNOSIS — E66.09 CLASS 1 OBESITY DUE TO EXCESS CALORIES WITHOUT SERIOUS COMORBIDITY WITH BODY MASS INDEX (BMI) OF 32.0 TO 32.9 IN ADULT: ICD-10-CM

## 2024-07-06 DIAGNOSIS — E06.3 HYPOTHYROIDISM DUE TO HASHIMOTO'S THYROIDITIS: ICD-10-CM

## 2024-07-06 DIAGNOSIS — E03.8 HYPOTHYROIDISM DUE TO HASHIMOTO'S THYROIDITIS: ICD-10-CM

## 2024-07-07 RX ORDER — LEVOTHYROXINE SODIUM 0.12 MG/1
125 TABLET ORAL
Qty: 90 TABLET | Refills: 1 | Status: SHIPPED | OUTPATIENT
Start: 2024-07-07

## 2024-07-10 ENCOUNTER — OFFICE VISIT (OUTPATIENT)
Dept: FAMILY MEDICINE CLINIC | Facility: CLINIC | Age: 29
End: 2024-07-10
Payer: COMMERCIAL

## 2024-07-10 VITALS
HEIGHT: 61 IN | OXYGEN SATURATION: 98 % | HEART RATE: 92 BPM | BODY MASS INDEX: 33.23 KG/M2 | TEMPERATURE: 97.9 F | DIASTOLIC BLOOD PRESSURE: 62 MMHG | WEIGHT: 176 LBS | SYSTOLIC BLOOD PRESSURE: 112 MMHG

## 2024-07-10 DIAGNOSIS — H65.06 RECURRENT ACUTE SEROUS OTITIS MEDIA OF BOTH EARS: Primary | ICD-10-CM

## 2024-07-10 DIAGNOSIS — H69.93 DYSFUNCTION OF BOTH EUSTACHIAN TUBES: ICD-10-CM

## 2024-07-10 DIAGNOSIS — J30.1 SEASONAL ALLERGIC RHINITIS DUE TO POLLEN: ICD-10-CM

## 2024-07-10 PROBLEM — H65.03 NON-RECURRENT ACUTE SEROUS OTITIS MEDIA OF BOTH EARS: Status: ACTIVE | Noted: 2024-07-10

## 2024-07-10 PROCEDURE — 99214 OFFICE O/P EST MOD 30 MIN: CPT | Performed by: NURSE PRACTITIONER

## 2024-07-10 RX ORDER — AMOXICILLIN 500 MG/1
500 CAPSULE ORAL EVERY 12 HOURS SCHEDULED
Qty: 14 CAPSULE | Refills: 0 | Status: SHIPPED | OUTPATIENT
Start: 2024-07-10 | End: 2024-07-17

## 2024-07-10 NOTE — PROGRESS NOTES
Ambulatory Visit  Name: Alejandro Lawler      : 1995      MRN: [id number]  Encounter Provider: DALLAS Dyer  Encounter Date: 7/10/2024   Encounter department: [facility]    Assessment & Plan   1. Recurrent acute serous otitis media of both ears  Assessment & Plan:  7-day course of amoxicillin was ordered for treatment of bilateral otitis media.  Patient is currently breast-feeding so I will avoid the use of any steroids.  She was also advised that Tylenol can be used as needed to help with bilateral otalgia.  Orders:  -     amoxicillin (AMOXIL) 500 mg capsule; Take 1 capsule (500 mg total) by mouth every 12 (twelve) hours for 7 days  2. Seasonal allergic rhinitis due to pollen  Assessment & Plan:  Patient was advised to continue Flonase daily.  3. Dysfunction of both eustachian tubes  Assessment & Plan:  Patient was advised to continue Flonase daily.    Depression Screening and Follow-up Plan: Patient was screened for depression during today's encounter. They screened negative with a PHQ-2 score of 0.      History of Present Illness     Bilateral otalgia: Patient reports that over the past few days she has been experiencing bilateral otalgia.  Patient does report that the pain in the left ear is consistently worse than the right.  She also reports noting bilateral mastoid tenderness but denies hearing loss or discharge from the bilateral ears.  Patient does have a history of bilateral ETD and recurrent otitis media as a child.  She does report that she did have tympanostomy tubes as a child but she has continued to have recurrent ear infections as an adult and she was advised by an ENT specialist in the past that she will most likely need tympanostomy tubes again.  Patient is currently managed on Flonase daily for allergic rhinitis and bilateral ETD.        Review of Systems   Constitutional:  Negative for chills and fever.   HENT:  Positive for ear pain (bilateral). Negative  "for congestion, postnasal drip, rhinorrhea, sinus pressure, sinus pain and sore throat.         Bilateral ear congestion   Eyes:  Negative for pain and visual disturbance.   Respiratory:  Negative for cough, chest tightness, shortness of breath and wheezing.    Cardiovascular:  Negative for chest pain, palpitations and leg swelling.   Gastrointestinal:  Negative for abdominal pain, constipation, diarrhea, nausea and vomiting.   Endocrine: Negative for cold intolerance and heat intolerance.   Genitourinary:  Negative for decreased urine volume, dysuria and hematuria.   Musculoskeletal:  Negative for arthralgias, back pain and myalgias.   Skin:  Negative for color change and rash.   Allergic/Immunologic: Positive for environmental allergies.   Neurological:  Negative for dizziness, seizures, syncope, weakness, light-headedness, numbness and headaches.   Hematological:  Negative for adenopathy.   Psychiatric/Behavioral:  Negative for confusion. The patient is not nervous/anxious.    All other systems reviewed and are negative.      Objective     /62 (BP Location: Right arm, Patient Position: Sitting, Cuff Size: Adult)   Pulse 92   Temp 97.9 °F (36.6 °C) (Tympanic)   Ht 5' 1\" (1.549 m)   Wt 79.8 kg (176 lb)   SpO2 98%   BMI 33.25 kg/m²     Physical Exam  Vitals and nursing note reviewed.   Constitutional:       General: She is not in acute distress.     Appearance: Normal appearance. She is well-developed. She is not ill-appearing.   HENT:      Head: Normocephalic.      Right Ear: Hearing normal. A middle ear effusion (large) is present. Tympanic membrane is injected and erythematous.      Left Ear: Hearing normal. A middle ear effusion (large) is present. Tympanic membrane is injected and erythematous.   Eyes:      Conjunctiva/sclera: Conjunctivae normal.   Cardiovascular:      Rate and Rhythm: Normal rate and regular rhythm.      Pulses: Normal pulses.           Carotid pulses are 2+ on the right side and " 2+ on the left side.       Radial pulses are 2+ on the right side and 2+ on the left side.        Posterior tibial pulses are 2+ on the right side and 2+ on the left side.      Heart sounds: Normal heart sounds. No murmur heard.  Pulmonary:      Effort: Pulmonary effort is normal. No respiratory distress.      Breath sounds: Normal breath sounds. No decreased breath sounds, wheezing, rhonchi or rales.   Abdominal:      General: Abdomen is flat. Bowel sounds are normal. There is no distension.      Palpations: Abdomen is soft.      Tenderness: There is no abdominal tenderness. There is no guarding.   Musculoskeletal:         General: No swelling. Normal range of motion.      Cervical back: Normal range of motion and neck supple.      Right lower leg: No edema.      Left lower leg: No edema.   Skin:     General: Skin is warm and dry.      Capillary Refill: Capillary refill takes less than 2 seconds.   Neurological:      General: No focal deficit present.      Mental Status: She is alert and oriented to person, place, and time.   Psychiatric:         Mood and Affect: Mood normal.         Behavior: Behavior normal.         Thought Content: Thought content normal.         Judgment: Judgment normal.       Administrative Statements

## 2024-07-10 NOTE — ASSESSMENT & PLAN NOTE
7-day course of amoxicillin was ordered for treatment of bilateral otitis media.  Patient is currently breast-feeding so I will avoid the use of any steroids.  She was also advised that Tylenol can be used as needed to help with bilateral otalgia.

## 2024-07-30 DIAGNOSIS — E06.3 HYPOTHYROIDISM DUE TO HASHIMOTO'S THYROIDITIS: Primary | ICD-10-CM

## 2024-07-30 DIAGNOSIS — R53.83 OTHER FATIGUE: ICD-10-CM

## 2024-07-30 DIAGNOSIS — E03.8 HYPOTHYROIDISM DUE TO HASHIMOTO'S THYROIDITIS: Primary | ICD-10-CM

## 2024-08-01 ENCOUNTER — OFFICE VISIT (OUTPATIENT)
Dept: URGENT CARE | Facility: CLINIC | Age: 29
End: 2024-08-01
Payer: COMMERCIAL

## 2024-08-01 VITALS
OXYGEN SATURATION: 97 % | SYSTOLIC BLOOD PRESSURE: 120 MMHG | TEMPERATURE: 99 F | DIASTOLIC BLOOD PRESSURE: 82 MMHG | RESPIRATION RATE: 16 BRPM | HEART RATE: 68 BPM

## 2024-08-01 DIAGNOSIS — J02.9 SORE THROAT: ICD-10-CM

## 2024-08-01 DIAGNOSIS — J03.90 EXUDATIVE TONSILLITIS: Primary | ICD-10-CM

## 2024-08-01 LAB — S PYO AG THROAT QL: NEGATIVE

## 2024-08-01 PROCEDURE — 99213 OFFICE O/P EST LOW 20 MIN: CPT

## 2024-08-01 PROCEDURE — 87880 STREP A ASSAY W/OPTIC: CPT

## 2024-08-01 RX ORDER — PENICILLIN V POTASSIUM 500 MG/1
500 TABLET ORAL 2 TIMES DAILY
Qty: 20 TABLET | Refills: 0 | Status: SHIPPED | OUTPATIENT
Start: 2024-08-01 | End: 2024-08-11

## 2024-08-01 NOTE — PROGRESS NOTES
[facility]        NAME: Alejandro Lawler is a 29 y.o. female  : 1995    MRN: [id number]  DATE: 2024  TIME: 7:21 PM    Assessment and Plan   Exudative tonsillitis [J03.90]  1. Exudative tonsillitis  penicillin V potassium (VEETID) 500 mg tablet    POCT rapid strepA    Throat culture      2. Sore throat  POCT rapid strepA    Throat culture        Rapid strep negative.  Pending throat culture.    Patient Instructions       Follow up with PCP in 3-5 days.  Proceed to  ER if symptoms worsen.    If tests have been performed at [facility], our office will contact you with results if changes need to be made to the care plan discussed with you at the visit.  You can review your full results on [facility].    Chief Complaint     Chief Complaint   Patient presents with    Earache     Patient has bilat ear pain x10 days. Was given 7 day course of abx which she finished on  and has had not any relief          History of Present Illness       30 y/o F presents for sore throat, bilateral ear pain, nasal congestion and postnasal drip x 2 weeks.  PCP gave 500 mg amoxicillin 3 times daily.  Took as directed.  Patient mitts that did not help her symptoms at all.  Denies any fevers or chills.        Review of Systems   Review of Systems   Constitutional:  Negative for chills and fever.   HENT:  Positive for congestion, ear pain, postnasal drip and sore throat. Negative for rhinorrhea.    Respiratory:  Negative for cough.          Current Medications       Current Outpatient Medications:     penicillin V potassium (VEETID) 500 mg tablet, Take 1 tablet (500 mg total) by mouth 2 (two) times a day for 10 days, Disp: 20 tablet, Rfl: 0    fluticasone (FLONASE) 50 mcg/act nasal spray, 1 spray into each nostril daily, Disp: , Rfl:     levothyroxine 125 mcg tablet, TAKE 1 TABLET (125 MCG TOTAL) BY MOUTH DAILY IN THE EARLY MORNING, Disp: 90 tablet, Rfl: 1    metFORMIN (GLUCOPHAGE) 500 mg tablet, TAKE 1  TABLET BY MOUTH EVERY DAY WITH BREAKFAST, Disp: 90 tablet, Rfl: 1    Multiple Vitamin (MULTIVITAMIN ADULT PO), as directed Orally, Disp: , Rfl:     Prenatal Vit-Fe Fumarate-FA (PRENATAL VITAMIN) 27-0.8 MG TABS, Take by mouth, Disp: , Rfl:     triamcinolone (KENALOG) 0.1 % cream, Apply topically 2 (two) times a day, Disp: 45 g, Rfl: 1    Current Allergies     Allergies as of 08/01/2024    (No Known Allergies)            The following portions of the patient's history were reviewed and updated as appropriate: allergies, current medications, past family history, past medical history, past social history, past surgical history and problem list.     Past Medical History:   Diagnosis Date    Disease of thyroid gland 2012    Hashimoto's disease     Neck pain 02/15/2023    Varicella     Yeast dermatitis 08/16/2018       Past Surgical History:   Procedure Laterality Date    MYRINGOTOMY      NOSE SURGERY         Family History   Problem Relation Age of Onset    Hashimoto's thyroiditis Mother     Chronic bronchitis Father          Medications have been verified.        Objective   /82   Pulse 68   Temp 99 °F (37.2 °C)   Resp 16   SpO2 97%   No LMP recorded.       Physical Exam     Physical Exam  Vitals and nursing note reviewed.   Constitutional:       General: She is not in acute distress.     Appearance: She is not toxic-appearing.   HENT:      Head: Normocephalic and atraumatic.      Right Ear: Tympanic membrane, ear canal and external ear normal.      Left Ear: Tympanic membrane, ear canal and external ear normal.      Nose: Nose normal.      Mouth/Throat:      Mouth: Mucous membranes are moist.      Pharynx: Oropharyngeal exudate and posterior oropharyngeal erythema present.   Eyes:      Conjunctiva/sclera: Conjunctivae normal.   Pulmonary:      Effort: Pulmonary effort is normal.      Breath sounds: Normal breath sounds.   Lymphadenopathy:      Cervical: Cervical adenopathy present.   Neurological:      Mental  Status: She is alert.   Psychiatric:         Mood and Affect: Mood normal.         Behavior: Behavior normal.

## 2024-08-05 LAB — B-HEM STREP SPEC QL CULT: NEGATIVE

## 2024-08-09 ENCOUNTER — APPOINTMENT (OUTPATIENT)
Dept: LAB | Facility: CLINIC | Age: 29
End: 2024-08-09
Payer: COMMERCIAL

## 2024-08-09 DIAGNOSIS — Z13.220 SCREENING FOR LIPID DISORDERS: Primary | ICD-10-CM

## 2024-08-09 DIAGNOSIS — R63.5 WEIGHT GAIN: ICD-10-CM

## 2024-08-09 DIAGNOSIS — E88.819 INSULIN RESISTANCE: ICD-10-CM

## 2024-08-09 PROBLEM — H65.03 NON-RECURRENT ACUTE SEROUS OTITIS MEDIA OF BOTH EARS: Status: RESOLVED | Noted: 2024-07-10 | Resolved: 2024-08-09

## 2024-08-09 LAB
CHOLEST SERPL-MCNC: 155 MG/DL
HDLC SERPL-MCNC: 49 MG/DL
INSULIN SERPL-ACNC: 32.14 UIU/ML (ref 1.9–23)
LDLC SERPL CALC-MCNC: 78 MG/DL (ref 0–100)
NONHDLC SERPL-MCNC: 106 MG/DL
TRIGL SERPL-MCNC: 141 MG/DL

## 2024-08-09 PROCEDURE — 80061 LIPID PANEL: CPT

## 2024-08-09 PROCEDURE — 83525 ASSAY OF INSULIN: CPT

## 2024-08-09 PROCEDURE — 36415 COLL VENOUS BLD VENIPUNCTURE: CPT

## 2024-08-16 ENCOUNTER — HOSPITAL ENCOUNTER (EMERGENCY)
Facility: HOSPITAL | Age: 29
Discharge: HOME | End: 2024-08-17
Attending: EMERGENCY MEDICINE
Payer: COMMERCIAL

## 2024-08-16 ENCOUNTER — APPOINTMENT (EMERGENCY)
Dept: RADIOLOGY | Facility: HOSPITAL | Age: 29
End: 2024-08-16
Payer: COMMERCIAL

## 2024-08-16 DIAGNOSIS — R20.2 NUMBNESS AND TINGLING: Primary | ICD-10-CM

## 2024-08-16 DIAGNOSIS — R07.9 CHEST PAIN: ICD-10-CM

## 2024-08-16 DIAGNOSIS — R20.0 NUMBNESS AND TINGLING: Primary | ICD-10-CM

## 2024-08-16 LAB
BASOPHILS # BLD AUTO: 0.05 THOUSANDS/ÂΜL (ref 0–0.1)
BASOPHILS NFR BLD AUTO: 1 % (ref 0–1)
EOSINOPHIL # BLD AUTO: 0.13 THOUSAND/ÂΜL (ref 0–0.61)
EOSINOPHIL NFR BLD AUTO: 2 % (ref 0–6)
ERYTHROCYTE [DISTWIDTH] IN BLOOD BY AUTOMATED COUNT: 12.3 % (ref 11.6–15.1)
HCT VFR BLD AUTO: 40.9 % (ref 34.8–46.1)
HGB BLD-MCNC: 13.4 G/DL (ref 11.5–15.4)
IMM GRANULOCYTES # BLD AUTO: 0.01 THOUSAND/UL (ref 0–0.2)
IMM GRANULOCYTES NFR BLD AUTO: 0 % (ref 0–2)
INR PPP: 0.9 (ref 0.85–1.19)
LYMPHOCYTES # BLD AUTO: 3.32 THOUSANDS/ÂΜL (ref 0.6–4.47)
LYMPHOCYTES NFR BLD AUTO: 43 % (ref 14–44)
MCH RBC QN AUTO: 28.8 PG (ref 26.8–34.3)
MCHC RBC AUTO-ENTMCNC: 32.8 G/DL (ref 31.4–37.4)
MCV RBC AUTO: 88 FL (ref 82–98)
MONOCYTES # BLD AUTO: 0.5 THOUSAND/ÂΜL (ref 0.17–1.22)
MONOCYTES NFR BLD AUTO: 6 % (ref 4–12)
NEUTROPHILS # BLD AUTO: 3.76 THOUSANDS/ÂΜL (ref 1.85–7.62)
NEUTS SEG NFR BLD AUTO: 48 % (ref 43–75)
NRBC BLD AUTO-RTO: 0 /100 WBCS
PLATELET # BLD AUTO: 290 THOUSANDS/UL (ref 149–390)
PMV BLD AUTO: 9.6 FL (ref 8.9–12.7)
PROTHROMBIN TIME: 12.5 SECONDS (ref 12.3–15)
RBC # BLD AUTO: 4.66 MILLION/UL (ref 3.81–5.12)
WBC # BLD AUTO: 7.77 THOUSAND/UL (ref 4.31–10.16)

## 2024-08-16 PROCEDURE — 99285 EMERGENCY DEPT VISIT HI MDM: CPT

## 2024-08-16 PROCEDURE — 85025 COMPLETE CBC W/AUTO DIFF WBC: CPT | Performed by: EMERGENCY MEDICINE

## 2024-08-16 PROCEDURE — 71045 X-RAY EXAM CHEST 1 VIEW: CPT

## 2024-08-16 PROCEDURE — 85610 PROTHROMBIN TIME: CPT

## 2024-08-16 PROCEDURE — 84703 CHORIONIC GONADOTROPIN ASSAY: CPT

## 2024-08-16 PROCEDURE — 36415 COLL VENOUS BLD VENIPUNCTURE: CPT

## 2024-08-16 PROCEDURE — 93005 ELECTROCARDIOGRAM TRACING: CPT

## 2024-08-16 PROCEDURE — 99285 EMERGENCY DEPT VISIT HI MDM: CPT | Performed by: EMERGENCY MEDICINE

## 2024-08-16 PROCEDURE — 80053 COMPREHEN METABOLIC PANEL: CPT | Performed by: EMERGENCY MEDICINE

## 2024-08-16 PROCEDURE — 84484 ASSAY OF TROPONIN QUANT: CPT | Performed by: EMERGENCY MEDICINE

## 2024-08-17 ENCOUNTER — APPOINTMENT (EMERGENCY)
Dept: RADIOLOGY | Facility: HOSPITAL | Age: 29
End: 2024-08-17
Payer: COMMERCIAL

## 2024-08-17 VITALS
SYSTOLIC BLOOD PRESSURE: 125 MMHG | DIASTOLIC BLOOD PRESSURE: 64 MMHG | RESPIRATION RATE: 15 BRPM | HEART RATE: 78 BPM | TEMPERATURE: 98.5 F | OXYGEN SATURATION: 98 %

## 2024-08-17 LAB
ALBUMIN SERPL BCG-MCNC: 4.8 G/DL (ref 3.5–5)
ALP SERPL-CCNC: 50 U/L (ref 34–104)
ALT SERPL W P-5'-P-CCNC: 72 U/L (ref 7–52)
ANION GAP SERPL CALCULATED.3IONS-SCNC: 11 MMOL/L (ref 4–13)
AST SERPL W P-5'-P-CCNC: 36 U/L (ref 13–39)
ATRIAL RATE: 86 BPM
BILIRUB SERPL-MCNC: 0.31 MG/DL (ref 0.2–1)
BUN SERPL-MCNC: 11 MG/DL (ref 5–25)
CALCIUM SERPL-MCNC: 10 MG/DL (ref 8.4–10.2)
CARDIAC TROPONIN I PNL SERPL HS: <2 NG/L
CARDIAC TROPONIN I PNL SERPL HS: <2 NG/L
CHLORIDE SERPL-SCNC: 100 MMOL/L (ref 96–108)
CO2 SERPL-SCNC: 25 MMOL/L (ref 21–32)
CREAT SERPL-MCNC: 0.62 MG/DL (ref 0.6–1.3)
GFR SERPL CREATININE-BSD FRML MDRD: 120 ML/MIN/1.73SQ M
GLUCOSE SERPL-MCNC: 101 MG/DL (ref 65–140)
HCG SERPL QL: NEGATIVE
P AXIS: 35 DEGREES
POTASSIUM SERPL-SCNC: 4.1 MMOL/L (ref 3.5–5.3)
PR INTERVAL: 144 MS
PROT SERPL-MCNC: 8.1 G/DL (ref 6.4–8.4)
QRS AXIS: 53 DEGREES
QRSD INTERVAL: 80 MS
QT INTERVAL: 372 MS
QTC INTERVAL: 445 MS
SODIUM SERPL-SCNC: 136 MMOL/L (ref 135–147)
T WAVE AXIS: 32 DEGREES
VENTRICULAR RATE: 86 BPM

## 2024-08-17 PROCEDURE — 36415 COLL VENOUS BLD VENIPUNCTURE: CPT | Performed by: EMERGENCY MEDICINE

## 2024-08-17 PROCEDURE — 93010 ELECTROCARDIOGRAM REPORT: CPT | Performed by: INTERNAL MEDICINE

## 2024-08-17 PROCEDURE — 70496 CT ANGIOGRAPHY HEAD: CPT

## 2024-08-17 PROCEDURE — 84484 ASSAY OF TROPONIN QUANT: CPT | Performed by: EMERGENCY MEDICINE

## 2024-08-17 PROCEDURE — 70498 CT ANGIOGRAPHY NECK: CPT

## 2024-08-17 RX ADMIN — IOHEXOL 85 ML: 350 INJECTION, SOLUTION INTRAVENOUS at 02:19

## 2024-08-17 NOTE — ED PROVIDER NOTES
History  Chief Complaint   Patient presents with    Numbness     Mid sternal, reproducible, L face and  LUE, LLE numbness. No thinners, numbness started 45min ago     Alejandro Lawler is a 29 y.o. female who presents to the Emergency department with chest pain, left face, left arm, left leg numbness. Patient reports a past medical history of Hashimoto's thyroiditis.  She started late this evening with chest pressure in the center of her chest that does not radiate anywhere.  She also had sudden onset left face, left arm, left leg numbness and heaviness.  This is not happened to her before.  She is not on exogenous estrogen.  She does not have a headache.  She has no visual changes.  No gait changes.  She has no shortness of breath.  Pain is not pleuritic.  No dizziness.  No lightheadedness.           Prior to Admission Medications   Prescriptions Last Dose Informant Patient Reported? Taking?   Multiple Vitamin (MULTIVITAMIN ADULT PO)  Self Yes No   Sig: as directed Orally   Prenatal Vit-Fe Fumarate-FA (PRENATAL VITAMIN) 27-0.8 MG TABS  Self Yes No   Sig: Take by mouth   fluticasone (FLONASE) 50 mcg/act nasal spray   Yes No   Si spray into each nostril daily   levothyroxine 125 mcg tablet   No No   Sig: TAKE 1 TABLET (125 MCG TOTAL) BY MOUTH DAILY IN THE EARLY MORNING   metFORMIN (GLUCOPHAGE) 500 mg tablet   No No   Sig: TAKE 1 TABLET BY MOUTH EVERY DAY WITH BREAKFAST   triamcinolone (KENALOG) 0.1 % cream   No No   Sig: Apply topically 2 (two) times a day      Facility-Administered Medications: None       Past Medical History:   Diagnosis Date    Disease of thyroid gland     Hashimoto's disease     Neck pain 02/15/2023    Sleep difficulties     Varicella     Yeast dermatitis 2018       Past Surgical History:   Procedure Laterality Date    MYRINGOTOMY      NOSE SURGERY         Family History   Problem Relation Age of Onset    Hashimoto's thyroiditis Mother     Chronic bronchitis Father      I have reviewed  and agree with the history as documented.    E-Cigarette/Vaping    E-Cigarette Use Never User      E-Cigarette/Vaping Substances    Nicotine No     THC No     CBD No     Flavoring No     Other No     Unknown No      Social History     Tobacco Use    Smoking status: Never    Smokeless tobacco: Never   Vaping Use    Vaping status: Never Used   Substance Use Topics    Alcohol use: No    Drug use: No        Review of Systems   Constitutional:  Negative for chills and fever.   Eyes:  Negative for photophobia.   Respiratory:  Negative for cough, chest tightness and shortness of breath.    Cardiovascular:  Positive for chest pain. Negative for leg swelling.   Gastrointestinal:  Negative for abdominal pain, constipation, diarrhea, nausea and vomiting.   Genitourinary:  Negative for dysuria.   Neurological:  Positive for weakness and numbness. Negative for seizures, syncope, facial asymmetry, speech difficulty, light-headedness and headaches.       Physical Exam  ED Triage Vitals [08/16/24 2247]   Temperature Pulse Respirations Blood Pressure SpO2   98.5 °F (36.9 °C) 90 17 120/94 100 %      Temp src Heart Rate Source Patient Position - Orthostatic VS BP Location FiO2 (%)   -- -- -- -- --      Pain Score       10 - Worst Possible Pain             Orthostatic Vital Signs  Vitals:    08/16/24 2247 08/17/24 0130 08/17/24 0330   BP: 120/94 152/72 125/64   Pulse: 90 92 78       Physical Exam  Vitals reviewed.   Constitutional:       General: She is not in acute distress.     Appearance: Normal appearance. She is not ill-appearing, toxic-appearing or diaphoretic.   HENT:      Head: Normocephalic and atraumatic.      Nose: Nose normal.   Eyes:      General: No visual field deficit or scleral icterus.     Extraocular Movements: Extraocular movements intact.      Conjunctiva/sclera: Conjunctivae normal.      Pupils: Pupils are equal, round, and reactive to light.   Cardiovascular:      Rate and Rhythm: Normal rate and regular rhythm.       Pulses: Normal pulses.      Heart sounds: Normal heart sounds. No murmur heard.     No friction rub. No gallop.   Pulmonary:      Effort: Pulmonary effort is normal. No tachypnea, bradypnea, accessory muscle usage or respiratory distress.      Breath sounds: No stridor. No wheezing or rales.   Abdominal:      General: Abdomen is flat. There is no distension.      Palpations: Abdomen is soft.      Tenderness: There is no abdominal tenderness. There is no guarding.   Musculoskeletal:      Cervical back: Normal range of motion and neck supple. No rigidity or tenderness.      Right lower leg: No edema.      Left lower leg: No edema.   Skin:     General: Skin is warm and dry.      Capillary Refill: Capillary refill takes less than 2 seconds.   Neurological:      General: No focal deficit present.      Mental Status: She is alert and oriented to person, place, and time.      GCS: GCS eye subscore is 4. GCS verbal subscore is 5. GCS motor subscore is 6.      Cranial Nerves: Cranial nerves 2-12 are intact. No cranial nerve deficit, dysarthria or facial asymmetry.      Sensory: Sensation is intact. No sensory deficit.      Motor: Motor function is intact. No weakness, tremor or atrophy.      Coordination: Coordination is intact. Coordination normal. Heel to Shin Test normal.      Gait: Gait is intact. Gait and tandem walk normal.   Psychiatric:         Mood and Affect: Mood normal.         Behavior: Behavior normal. Behavior is cooperative.         Thought Content: Thought content normal.         Judgment: Judgment normal.         ED Medications  Medications   iohexol (OMNIPAQUE) 350 MG/ML injection (MULTI-DOSE) 85 mL (85 mL Intravenous Given 8/17/24 0219)       Diagnostic Studies  Results Reviewed       Procedure Component Value Units Date/Time    HS Troponin I 2hr [[id number]] Collected: 08/17/24 0147    Lab Status: Final result Specimen: Blood from Arm, Left Updated: 08/17/24 0217     hs TnI 2hr <2 ng/L      Delta 2hr  hsTnI --    HS Troponin 0hr (reflex protocol) [[id number]]  (Normal) Collected: 08/16/24 2323    Lab Status: Final result Specimen: Blood from Arm, Left Updated: 08/17/24 0010     hs TnI 0hr <2 ng/L     hCG, qualitative pregnancy [[id number]]  (Normal) Collected: 08/16/24 2323    Lab Status: Final result Specimen: Blood from Arm, Left Updated: 08/17/24 0007     Preg, Serum Negative    Comprehensive metabolic panel [[id number]]  (Abnormal) Collected: 08/16/24 2323    Lab Status: Final result Specimen: Blood from Arm, Left Updated: 08/17/24 0001     Sodium 136 mmol/L      Potassium 4.1 mmol/L      Chloride 100 mmol/L      CO2 25 mmol/L      ANION GAP 11 mmol/L      BUN 11 mg/dL      Creatinine 0.62 mg/dL      Glucose 101 mg/dL      Calcium 10.0 mg/dL      AST 36 U/L      ALT 72 U/L      Alkaline Phosphatase 50 U/L      Total Protein 8.1 g/dL      Albumin 4.8 g/dL      Total Bilirubin 0.31 mg/dL      eGFR 120 ml/min/1.73sq m     Narrative:      National Kidney Disease Foundation guidelines for Chronic Kidney Disease (CKD):     Stage 1 with normal or high GFR (GFR > 90 mL/min/1.73 square meters)    Stage 2 Mild CKD (GFR = 60-89 mL/min/1.73 square meters)    Stage 3A Moderate CKD (GFR = 45-59 mL/min/1.73 square meters)    Stage 3B Moderate CKD (GFR = 30-44 mL/min/1.73 square meters)    Stage 4 Severe CKD (GFR = 15-29 mL/min/1.73 square meters)    Stage 5 End Stage CKD (GFR <15 mL/min/1.73 square meters)  Note: GFR calculation is accurate only with a steady state creatinine    Protime-INR [[id number]]  (Normal) Collected: 08/16/24 2323    Lab Status: Final result Specimen: Blood from Arm, Left Updated: 08/16/24 2359     Protime 12.5 seconds      INR 0.90    Narrative:      INR Therapeutic Range    Indication                                             INR Range      Atrial Fibrillation                                               2.0-3.0  Hypercoagulable State                                    2.0.2.3  Left Ventricular  Asist Device                            2.0-3.0  Mechanical Heart Valve                                  -    Aortic(with afib, MI, embolism, HF, LA enlargement,    and/or coagulopathy)                                     2.0-3.0 (2.5-3.5)     Mitral                                                             2.5-3.5  Prosthetic/Bioprosthetic Heart Valve               2.0-3.0  Venous thromboembolism (VTE: VT, PE        2.0-3.0    CBC and differential [[id number]] Collected: 08/16/24 2323    Lab Status: Final result Specimen: Blood from Arm, Left Updated: 08/16/24 2340     WBC 7.77 Thousand/uL      RBC 4.66 Million/uL      Hemoglobin 13.4 g/dL      Hematocrit 40.9 %      MCV 88 fL      MCH 28.8 pg      MCHC 32.8 g/dL      RDW 12.3 %      MPV 9.6 fL      Platelets 290 Thousands/uL      nRBC 0 /100 WBCs      Segmented % 48 %      Immature Grans % 0 %      Lymphocytes % 43 %      Monocytes % 6 %      Eosinophils Relative 2 %      Basophils Relative 1 %      Absolute Neutrophils 3.76 Thousands/µL      Absolute Immature Grans 0.01 Thousand/uL      Absolute Lymphocytes 3.32 Thousands/µL      Absolute Monocytes 0.50 Thousand/µL      Eosinophils Absolute 0.13 Thousand/µL      Basophils Absolute 0.05 Thousands/µL                    CTA head and neck with and without contrast   Final Result by Analisa Shoemaker MD (08/17 0400)      CT Brain: No acute intracranial hemorrhage. No definite CT evidence of acute intracranial abnormality. Clinical correlation is necessary. If further evaluation is indicated, MRI could be performed, if there are no contraindications.      CT Angiography:  Unremarkable CTA neck and brain.      The tonsils and adenoids appear somewhat prominent. Clinical correlation recommended.      The study was marked in EPIC for immediate notification.      Workstation performed: [id number]         XR chest 1 view portable   ED Interpretation by Hema Tellez MD (08/17 0111)   No acute cardiopulmonary abnormality       Final Result by Mykel Dang MD (08/17 0418)      No acute cardiopulmonary disease.            Workstation performed: [id number]               Procedures  Procedures      ED Course  ED Course as of 08/17/24 0423   Sat Aug 17, 2024   0229 hs TnI 2hr: <2  No ACS   0230 EKG interpreted by me.  Normal sinus rate 86 normal axis.  No ST segment elevation or depressions.  QRS, ND, QT normal.   0318 Patient complaining of pain on the border of her L sternum. States she feels like it may be her breast due to decreased feeding from her baby. Nurse as chaperone for exam. No nodules palpated on exam. She has tenderness near where her 4-6th ribs meet her sternum on the left and she says this pain is the pain she is feeling when she describes her chest pain.                   Stroke Assessment       Row Name 08/17/24 0144             NIH Stroke Scale    Interval --      Level of Consciousness (1a.) 0      LOC Questions (1b.) 0      LOC Commands (1c.) 0      Best Gaze (2.) 0      Visual (3.) 0      Facial Palsy (4.) 0      Motor Arm, Left (5a.) 0      Motor Arm, Right (5b.) 0      Motor Leg, Left (6a.) 0      Motor Leg, Right (6b.) 0      Limb Ataxia (7.) 0      Sensory (8.) 0      Best Language (9.) 0      Dysarthria (10.) 0      Extinction and Inattention (11.) (Formerly Neglect) 0      Total 0                              SBIRT 22yo+      Flowsheet Row Most Recent Value   Initial Alcohol Screen: US AUDIT-C     1. How often do you have a drink containing alcohol? 0 Filed at: 08/16/2024 2246   2. How many drinks containing alcohol do you have on a typical day you are drinking?  0 Filed at: 08/16/2024 2246   3b. FEMALE Any Age, or MALE 65+: How often do you have 4 or more drinks on one occassion? 0 Filed at: 08/16/2024 2246   Audit-C Score 0 Filed at: 08/16/2024 2246   JIMMY: How many times in the past year have you...    Used an illegal drug or used a prescription medication for non-medical reasons? Never Filed at:  08/16/2024 2246                  Medical Decision Making  Alejandro Lawler is a 29 y.o. female who presents to the emergency department for numbness, chest pain    I will order appropriate testing to narrow my differential    Differential diagnosis includes but is not limited to: Stroke, ACS, costochondritis, anxiety.    I evaluated patient immediately as she arrived to the room.  NIH of 0.  Appropriate testing was ordered.     While in the emergency department the patient's numbness improved and now is only intermittent.  She was able to walk all the way to the CAT scan room and back without assistance.    Chest x-ray, lab work, cardiac enzymes, ECG do not demonstrate a cause of her weakness, numbness, or chest pain.    CTA head and neck do not reveal a cause of patient's weakness or numbness.  Immediate finding notification was for somewhat prominent adenoids and tonsils.  Patient has a surgery scheduled on September 6 of this year for these.  No signs or symptoms or complaint of airway obstruction in the department today.    Patient will be discharged with appropriate follow-up instructions as well as return precautions. Patient agreeable and understands. Her numbness and chest pain are improved. She has no weakness on exam.     Amount and/or Complexity of Data Reviewed  Labs: ordered. Decision-making details documented in ED Course.  Radiology: ordered and independent interpretation performed.    Risk  Prescription drug management.          Disposition  Final diagnoses:   Numbness and tingling   Chest pain     Time reflects when diagnosis was documented in both MDM as applicable and the Disposition within this note       Time User Action Codes Description Comment    8/17/2024  4:07 AM Hema Tellez Add [R20.0,  R20.2] Numbness and tingling     8/17/2024  4:08 AM Hema Tellez Add [R07.9] Chest pain           ED Disposition       ED Disposition   Discharge    Condition   Stable    Date/Time   Sat Aug 17, 2024 7825     Comment   Alejandro Lawler discharge to home/self care.                   Follow-up Information       Follow up With Specialties Details Why Contact Info    [facility] Call  As needed [address]  [facility] [address] [phone]      [facility] Call  To follow-up on enlarged tonsils. [address] [address]            Patient's Medications   Discharge Prescriptions    No medications on file     No discharge procedures on file.    PDMP Review       None             ED Provider  Attending physically available and evaluated Alejandro Lawler. I managed the patient along with the ED Attending.    Electronically Signed by           Hema Tellez MD  08/17/24 0420       Hema Tellez MD  08/17/24 0423

## 2024-08-17 NOTE — ED ATTENDING ATTESTATION
8/16/2024  I, Van Hagen MD, saw and evaluated the patient. I have discussed the patient with the resident/non-physician practitioner and agree with the resident's/non-physician practitioner's findings, Plan of Care, and MDM as documented in the resident's/non-physician practitioner's note, except where noted. All available labs and Radiology studies were reviewed.  I was present for key portions of any procedure(s) performed by the resident/non-physician practitioner and I was immediately available to provide assistance.       At this point I agree with the current assessment done in the Emergency Department.  I have conducted an independent evaluation of this patient a history and physical is as follows:    ED Course     Emergency Department Note- Alejandro Lawler 29 y.o. female MRN: [id number]    Unit/Bed#: ED 25 Encounter: [id number]    Alejandro Lawler is a 29 y.o. female who presents with   Chief Complaint   Patient presents with    Numbness     Mid sternal, reproducible, L face and  LUE, LLE numbness. No thinners, numbness started 45min ago         History of Present Illness   HPI:  Alejandro Lawler is a 29 y.o. female who presents for evaluation of:  Left upper extremity and left lower extremity numbness and tingling.  Patient has no history of prior neurological illness including no history of stroke.  She does not take any anticoagulants or antiplatelet medications.  She has no history of cardiac disease.  She had onset of symptoms 45 minutes prior to arrival.  She denies associated headache and motor weakness.    Review of Systems   Constitutional:  Negative for fatigue and fever.   HENT:  Negative for congestion and sore throat.    Respiratory:  Negative for cough and shortness of breath.    Cardiovascular:  Negative for chest pain and palpitations.   Gastrointestinal:  Negative for abdominal pain and nausea.   Genitourinary:  Negative for flank pain and frequency.   Neurological:  Negative for  light-headedness and headaches.   Psychiatric/Behavioral:  Negative for dysphoric mood and hallucinations.    All other systems reviewed and are negative.      Historical Information   Past Medical History:   Diagnosis Date    Disease of thyroid gland     Hashimoto's disease     Neck pain 02/15/2023    Sleep difficulties     Varicella     Yeast dermatitis 2018     Past Surgical History:   Procedure Laterality Date    MYRINGOTOMY      NOSE SURGERY       Social History   Social History     Substance and Sexual Activity   Alcohol Use No     Social History     Substance and Sexual Activity   Drug Use No     Social History     Tobacco Use   Smoking Status Never   Smokeless Tobacco Never     Family History:   Family History   Problem Relation Age of Onset    Hashimoto's thyroiditis Mother     Chronic bronchitis Father        Meds/Allergies   PTA meds:   Prior to Admission Medications   Prescriptions Last Dose Informant Patient Reported? Taking?   Multiple Vitamin (MULTIVITAMIN ADULT PO)  Self Yes No   Sig: as directed Orally   Prenatal Vit-Fe Fumarate-FA (PRENATAL VITAMIN) 27-0.8 MG TABS  Self Yes No   Sig: Take by mouth   fluticasone (FLONASE) 50 mcg/act nasal spray   Yes No   Si spray into each nostril daily   levothyroxine 125 mcg tablet   No No   Sig: TAKE 1 TABLET (125 MCG TOTAL) BY MOUTH DAILY IN THE EARLY MORNING   metFORMIN (GLUCOPHAGE) 500 mg tablet   No No   Sig: TAKE 1 TABLET BY MOUTH EVERY DAY WITH BREAKFAST   triamcinolone (KENALOG) 0.1 % cream   No No   Sig: Apply topically 2 (two) times a day      Facility-Administered Medications: None     No Known Allergies    Objective   First Vitals:   Blood Pressure: 120/94 (24)  Pulse: 90 (24)  Temperature: 98.5 °F (36.9 °C) (24)  Respirations: 17 (24)  SpO2: 100 % (24)    Current Vitals:   Blood Pressure: 120/94 (24)  Pulse: 90 (24)  Temperature: 98.5 °F (36.9 °C) (24  )  Respirations: 17 (24)  SpO2: 100 % (24)    No intake or output data in the 24 hours ending 24 0139    Invasive Devices       Peripheral Intravenous Line  Duration             Peripheral IV 24 Left Antecubital <1 day                    Physical Exam  Vitals and nursing note reviewed.   Constitutional:       General: She is not in acute distress.     Appearance: Normal appearance. She is well-developed.   HENT:      Head: Normocephalic and atraumatic.      Right Ear: External ear normal.      Left Ear: External ear normal.      Nose: Nose normal.      Mouth/Throat:      Pharynx: No oropharyngeal exudate.   Eyes:      Conjunctiva/sclera: Conjunctivae normal.      Pupils: Pupils are equal, round, and reactive to light.   Cardiovascular:      Rate and Rhythm: Normal rate and regular rhythm.   Pulmonary:      Effort: Pulmonary effort is normal. No respiratory distress.   Abdominal:      General: Abdomen is flat. There is no distension.      Palpations: Abdomen is soft.   Musculoskeletal:         General: No deformity. Normal range of motion.      Cervical back: Normal range of motion and neck supple.   Skin:     General: Skin is warm and dry.      Capillary Refill: Capillary refill takes less than 2 seconds.   Neurological:      General: No focal deficit present.      Mental Status: She is alert and oriented to person, place, and time. Mental status is at baseline.      Coordination: Coordination normal.   Psychiatric:         Mood and Affect: Mood normal.         Behavior: Behavior normal.         Thought Content: Thought content normal.         Judgment: Judgment normal.           Medical Decision Makin.  Left upper extremity and left lower extremity paresthesias: CT scan head CT head and neck angiogram and rule out stroke.  ECG rule out arrhythmia; CBC rule out leukocytosis and anemia; complete metabolic profile rule out electrolyte disturbance, uremia, and disorders of  glucose homeostasis.  X-ray rule out cardiomegaly.  Patient's NIH stroke scale is 0.    Recent Results (from the past 36 hour(s))   ECG 12 lead    Collection Time: 08/16/24 10:46 PM   Result Value Ref Range    Ventricular Rate 86 BPM    Atrial Rate 86 BPM    VT Interval 144 ms    QRSD Interval 80 ms    QT Interval 372 ms    QTC Interval 445 ms    P Axis 35 degrees    QRS Axis 53 degrees    T Wave Axis 32 degrees   CBC and differential    Collection Time: 08/16/24 11:23 PM   Result Value Ref Range    WBC 7.77 4.31 - 10.16 Thousand/uL    RBC 4.66 3.81 - 5.12 Million/uL    Hemoglobin 13.4 11.5 - 15.4 g/dL    Hematocrit 40.9 34.8 - 46.1 %    MCV 88 82 - 98 fL    MCH 28.8 26.8 - 34.3 pg    MCHC 32.8 31.4 - 37.4 g/dL    RDW 12.3 11.6 - 15.1 %    MPV 9.6 8.9 - 12.7 fL    Platelets 290 149 - 390 Thousands/uL    nRBC 0 /100 WBCs    Segmented % 48 43 - 75 %    Immature Grans % 0 0 - 2 %    Lymphocytes % 43 14 - 44 %    Monocytes % 6 4 - 12 %    Eosinophils Relative 2 0 - 6 %    Basophils Relative 1 0 - 1 %    Absolute Neutrophils 3.76 1.85 - 7.62 Thousands/µL    Absolute Immature Grans 0.01 0.00 - 0.20 Thousand/uL    Absolute Lymphocytes 3.32 0.60 - 4.47 Thousands/µL    Absolute Monocytes 0.50 0.17 - 1.22 Thousand/µL    Eosinophils Absolute 0.13 0.00 - 0.61 Thousand/µL    Basophils Absolute 0.05 0.00 - 0.10 Thousands/µL   Comprehensive metabolic panel    Collection Time: 08/16/24 11:23 PM   Result Value Ref Range    Sodium 136 135 - 147 mmol/L    Potassium 4.1 3.5 - 5.3 mmol/L    Chloride 100 96 - 108 mmol/L    CO2 25 21 - 32 mmol/L    ANION GAP 11 4 - 13 mmol/L    BUN 11 5 - 25 mg/dL    Creatinine 0.62 0.60 - 1.30 mg/dL    Glucose 101 65 - 140 mg/dL    Calcium 10.0 8.4 - 10.2 mg/dL    AST 36 13 - 39 U/L    ALT 72 (H) 7 - 52 U/L    Alkaline Phosphatase 50 34 - 104 U/L    Total Protein 8.1 6.4 - 8.4 g/dL    Albumin 4.8 3.5 - 5.0 g/dL    Total Bilirubin 0.31 0.20 - 1.00 mg/dL    eGFR 120 ml/min/1.73sq m   HS Troponin 0hr  "(reflex protocol)    Collection Time: 08/16/24 11:23 PM   Result Value Ref Range    hs TnI 0hr <2 \"Refer to ACS Flowchart\"- see link ng/L   hCG, qualitative pregnancy    Collection Time: 08/16/24 11:23 PM   Result Value Ref Range    Preg, Serum Negative Negative   Protime-INR    Collection Time: 08/16/24 11:23 PM   Result Value Ref Range    Protime 12.5 12.3 - 15.0 seconds    INR 0.90 0.85 - 1.19     XR chest 1 view portable   ED Interpretation   No acute cardiopulmonary abnormality      CTA head and neck with and without contrast    (Results Pending)         Portions of the record may have been created with voice recognition software. Occasional wrong word or \"sound a like\" substitutions may have occurred due to the inherent limitations of voice recognition software.  Read the chart carefully and recognize, using context, where substitutions have occurred.        Critical Care Time  Procedures      "

## 2024-08-17 NOTE — DISCHARGE INSTRUCTIONS
Alejandro Lawler was seen in the emergency department today for chest pain, numbness.    Please follow-up with your family doctor as needed.      Please also follow-up with ENT for your tonsils    Please return to the emergency department if you experience any chest pain, shortness of breath, lightheadedness, passing out, weakness, changes in your balance or vision, fever, chills, or any other symptoms that are concerning to you

## 2024-08-20 ENCOUNTER — OFFICE VISIT (OUTPATIENT)
Dept: OBGYN CLINIC | Facility: CLINIC | Age: 29
End: 2024-08-20
Payer: COMMERCIAL

## 2024-08-20 VITALS
HEIGHT: 61 IN | DIASTOLIC BLOOD PRESSURE: 67 MMHG | BODY MASS INDEX: 33.23 KG/M2 | SYSTOLIC BLOOD PRESSURE: 122 MMHG | WEIGHT: 176 LBS

## 2024-08-20 DIAGNOSIS — M79.641 BILATERAL HAND PAIN: Primary | ICD-10-CM

## 2024-08-20 DIAGNOSIS — M79.642 BILATERAL HAND PAIN: Primary | ICD-10-CM

## 2024-08-20 PROCEDURE — 99213 OFFICE O/P EST LOW 20 MIN: CPT | Performed by: SURGERY

## 2024-08-20 NOTE — PROGRESS NOTES
Assessment    Bilateral carpal and Left cubital tunnel syndrome   Bilateral FCR tendonitis and multiple trigger fingers  Chronic neck pain      Plan    Discussed multiple areas of inflammation. Injection not warranted at this time due to wide area of symptoms  Reccommended obtaining labs to evaluate for inflammatory conditions  F/U 3-4 weeks after labs      Subjective     HPI    Patient ID:  Alejandro Lawler is a right hand dominant 29 y.o. female here for Follow up of the bilateral upper extremities.  She states the FCR injection did help for a few weeks. She states she has no pain now but her hands are numb. She is inquiring about a Carpal tunnel injections as they have worked well in the past      The following portions of the patient's history were reviewed and updated as appropriate: allergies, current medications, past family history, past medical history, past social history, past surgical history, and problem list.    Review of Systems     Objective    Imaging:  None     Physical Exam     Vitals:    08/20/24 1334   BP: 122/67       General appearance:  NAD   Cardiac:  Regular rate  Lungs:  Unlabored breathing  Abdomen:  Non-distended    Orthopedic Examination:  Bilateral upper extremity    Inspection: No open wounds or erythema.  No ecchymosis or swelling.  No muscle wasting.    Palpation: Tender to palpation Right A1 Index, Middle with nodules noted  Left A1 Index, middle, ring, small, thumb nodules noted  Mild tenderness FCR right wrist.    Range-of-motion: Normal elbow wrist finger range of motion.  No palpable subluxation of the ulnar nerve with elbow range of motion.    Strength: 5/5 wrist flexion extension, , intrinsics, 4/5 thumb opposition, APB bilaterally.    Sensation: Intact to light touch median radial ulnar nerve distribution bilaterally      Palpable radial pulse bilaterally  The upper extremities are warm and well-perfused.      Procedures

## 2024-08-23 ENCOUNTER — PATIENT MESSAGE (OUTPATIENT)
Dept: FAMILY MEDICINE CLINIC | Facility: CLINIC | Age: 29
End: 2024-08-23

## 2024-08-23 DIAGNOSIS — E06.3 HYPOTHYROIDISM DUE TO HASHIMOTO'S THYROIDITIS: Primary | ICD-10-CM

## 2024-08-23 DIAGNOSIS — E03.8 HYPOTHYROIDISM DUE TO HASHIMOTO'S THYROIDITIS: Primary | ICD-10-CM

## 2024-08-27 ENCOUNTER — APPOINTMENT (OUTPATIENT)
Dept: LAB | Facility: CLINIC | Age: 29
End: 2024-08-27
Payer: COMMERCIAL

## 2024-08-27 DIAGNOSIS — E03.8 HYPOTHYROIDISM DUE TO HASHIMOTO'S THYROIDITIS: ICD-10-CM

## 2024-08-27 DIAGNOSIS — M79.641 BILATERAL HAND PAIN: ICD-10-CM

## 2024-08-27 DIAGNOSIS — M79.642 BILATERAL HAND PAIN: ICD-10-CM

## 2024-08-27 DIAGNOSIS — E06.3 HYPOTHYROIDISM DUE TO HASHIMOTO'S THYROIDITIS: ICD-10-CM

## 2024-08-27 LAB
ANA SER QL IA: NEGATIVE
CRP SERPL QL: <1 MG/L
ERYTHROCYTE [SEDIMENTATION RATE] IN BLOOD: 14 MM/HOUR (ref 0–19)
T4 SERPL-MCNC: 5.91 UG/DL (ref 6.09–12.23)
TSH SERPL DL<=0.05 MIU/L-ACNC: 5.93 UIU/ML (ref 0.45–4.5)

## 2024-08-27 PROCEDURE — 84443 ASSAY THYROID STIM HORMONE: CPT

## 2024-08-27 PROCEDURE — 85652 RBC SED RATE AUTOMATED: CPT

## 2024-08-27 PROCEDURE — 86200 CCP ANTIBODY: CPT

## 2024-08-27 PROCEDURE — 84436 ASSAY OF TOTAL THYROXINE: CPT

## 2024-08-27 PROCEDURE — 86430 RHEUMATOID FACTOR TEST QUAL: CPT

## 2024-08-27 PROCEDURE — 36415 COLL VENOUS BLD VENIPUNCTURE: CPT

## 2024-08-27 PROCEDURE — 86038 ANTINUCLEAR ANTIBODIES: CPT

## 2024-08-27 PROCEDURE — 86140 C-REACTIVE PROTEIN: CPT

## 2024-08-28 DIAGNOSIS — E06.3 HYPOTHYROIDISM DUE TO HASHIMOTO'S THYROIDITIS: Primary | ICD-10-CM

## 2024-08-28 DIAGNOSIS — E03.8 HYPOTHYROIDISM DUE TO HASHIMOTO'S THYROIDITIS: Primary | ICD-10-CM

## 2024-08-28 LAB — RHEUMATOID FACT SER QL LA: NEGATIVE

## 2024-08-28 RX ORDER — LEVOTHYROXINE SODIUM 150 UG/1
150 TABLET ORAL
Qty: 100 TABLET | Refills: 1 | Status: SHIPPED | OUTPATIENT
Start: 2024-08-28

## 2024-08-30 LAB — CCP AB SER IA-ACNC: 0.6

## 2024-09-05 NOTE — PRE-PROCEDURE INSTRUCTIONS
Pre-Surgery Instructions:   Medication Instructions    fluticasone (FLONASE) 50 mcg/act nasal spray Uses PRN- OK to take day of surgery    levothyroxine 150 mcg tablet Take day of surgery.    Multiple Vitamin (MULTIVITAMIN ADULT PO) Hold from now till after procedure       Medication instructions for day surgery reviewed with caregiver(s). Please use only a sip of water to take your instructed morning medications (if any). Avoid all over the counter vitamins, supplements and NSAIDS for one week prior to surgery per anesthesia guidelines. Tylenol is ok to take as needed.     You will receive a call one business day prior to surgery with an arrival time and hospital directions. If surgery is scheduled on a Monday, the hospital will be calling you on the Friday prior to your surgery. If you have not heard from anyone by 8pm, please call the hospital supervisor through the hospital  at [phone]. (Juan [phone]).    Stop all solid food/candy at midnight regardless of surgical time     If currently formula fed, formula can be continued up to 6 hours prior to scheduled arrival time at hospital.    If currently breast milk fed, breast milk can be continued up to 4 hours prior to scheduled arrival time at hospital.    Clear liquids are encouraged to be continued up to 2 hours prior to scheduled arrival time at hospital. Clear liquids include water, clear apple juice (no pulp), Pedialyte, and Gatorade. For infants under 6 months, Pedialyte is the recommended clear liquid of choice.     Follow the pre-surgery showering instructions as listed in the “My Surgical Experience Booklet” or otherwise provided by your surgeon's office. If you were not given any bathing recommendations, please bathe the patient the night prior to surgery and the morning of surgery with an antibacterial soap, such as Dial. Do not apply any lotions, creams, including makeup, cologne, deodorant, or perfumes after showering on the day  of your surgery.     No contact lenses, eye make-up, or artificial eyelashes. Remove nail polish, including gel polish, and any artificial, gel, or acrylic nails if possible. Remove all jewelry including rings and body piercing jewelry.     Dress the patient in clean, comfortable clothing that is easy to take on and off day of surgery.    Keep any valuables, jewelry, piercings at home. Please bring any specially ordered equipment (sling, braces) if indicated. Patient may bring a small security item, such as stuffed animal/blanket with them to the hospital.     Arrange for a responsible person to drive patient to and from the hospital on the day of surgery. Visitor Guidelines discussed.     Call the surgeon's office with any new illnesses, exposures, or additional questions prior to surgery.    Please reference your “My Surgical Experience Booklet” for additional information to prepare for the upcoming surgery.

## 2024-09-06 ENCOUNTER — HOSPITAL ENCOUNTER (OUTPATIENT)
Facility: HOSPITAL | Age: 29
Setting detail: OUTPATIENT SURGERY
Discharge: HOME | End: 2024-09-07
Attending: OTOLARYNGOLOGY | Admitting: OTOLARYNGOLOGY
Payer: COMMERCIAL

## 2024-09-06 ENCOUNTER — ANESTHESIA EVENT (OUTPATIENT)
Dept: PERIOP | Facility: HOSPITAL | Age: 29
End: 2024-09-06
Payer: COMMERCIAL

## 2024-09-06 ENCOUNTER — ANESTHESIA (OUTPATIENT)
Dept: PERIOP | Facility: HOSPITAL | Age: 29
End: 2024-09-06
Payer: COMMERCIAL

## 2024-09-06 DIAGNOSIS — J35.01 CHRONIC TONSILLITIS: ICD-10-CM

## 2024-09-06 DIAGNOSIS — R06.83 SNORING: ICD-10-CM

## 2024-09-06 DIAGNOSIS — Z90.89 S/P TONSILLECTOMY: Primary | ICD-10-CM

## 2024-09-06 LAB
ABO GROUP BLD: NORMAL
BLD GP AB SCN SERPL QL: NEGATIVE
EXT PREGNANCY TEST URINE: NEGATIVE
EXT. CONTROL: NORMAL
RH BLD: NEGATIVE
SPECIMEN EXPIRATION DATE: NORMAL

## 2024-09-06 PROCEDURE — 88342 IMHCHEM/IMCYTCHM 1ST ANTB: CPT | Performed by: STUDENT IN AN ORGANIZED HEALTH CARE EDUCATION/TRAINING PROGRAM

## 2024-09-06 PROCEDURE — 88307 TISSUE EXAM BY PATHOLOGIST: CPT | Performed by: STUDENT IN AN ORGANIZED HEALTH CARE EDUCATION/TRAINING PROGRAM

## 2024-09-06 PROCEDURE — 81025 URINE PREGNANCY TEST: CPT | Performed by: OTOLARYNGOLOGY

## 2024-09-06 PROCEDURE — 88341 IMHCHEM/IMCYTCHM EA ADD ANTB: CPT | Performed by: STUDENT IN AN ORGANIZED HEALTH CARE EDUCATION/TRAINING PROGRAM

## 2024-09-06 PROCEDURE — 42821 REMOVE TONSILS AND ADENOIDS: CPT | Performed by: OTOLARYNGOLOGY

## 2024-09-06 PROCEDURE — 88304 TISSUE EXAM BY PATHOLOGIST: CPT | Performed by: STUDENT IN AN ORGANIZED HEALTH CARE EDUCATION/TRAINING PROGRAM

## 2024-09-06 PROCEDURE — 88364 INSITU HYBRIDIZATION (FISH): CPT | Performed by: STUDENT IN AN ORGANIZED HEALTH CARE EDUCATION/TRAINING PROGRAM

## 2024-09-06 PROCEDURE — 88360 TUMOR IMMUNOHISTOCHEM/MANUAL: CPT | Performed by: STUDENT IN AN ORGANIZED HEALTH CARE EDUCATION/TRAINING PROGRAM

## 2024-09-06 PROCEDURE — 86850 RBC ANTIBODY SCREEN: CPT | Performed by: OTOLARYNGOLOGY

## 2024-09-06 PROCEDURE — 86901 BLOOD TYPING SEROLOGIC RH(D): CPT | Performed by: OTOLARYNGOLOGY

## 2024-09-06 PROCEDURE — 88365 INSITU HYBRIDIZATION (FISH): CPT | Performed by: STUDENT IN AN ORGANIZED HEALTH CARE EDUCATION/TRAINING PROGRAM

## 2024-09-06 PROCEDURE — 86900 BLOOD TYPING SEROLOGIC ABO: CPT | Performed by: OTOLARYNGOLOGY

## 2024-09-06 RX ORDER — DEXAMETHASONE SODIUM PHOSPHATE 10 MG/ML
10 INJECTION, SOLUTION INTRAMUSCULAR; INTRAVENOUS ONCE
Status: DISCONTINUED | OUTPATIENT
Start: 2024-09-06 | End: 2024-09-06

## 2024-09-06 RX ORDER — ACETAMINOPHEN 160 MG/5ML
640 SUSPENSION ORAL EVERY 6 HOURS PRN
Status: DISCONTINUED | OUTPATIENT
Start: 2024-09-06 | End: 2024-09-07 | Stop reason: HOSPADM

## 2024-09-06 RX ORDER — MAGNESIUM HYDROXIDE 1200 MG/15ML
LIQUID ORAL AS NEEDED
Status: DISCONTINUED | OUTPATIENT
Start: 2024-09-06 | End: 2024-09-06 | Stop reason: HOSPADM

## 2024-09-06 RX ORDER — FENTANYL CITRATE 50 UG/ML
INJECTION, SOLUTION INTRAMUSCULAR; INTRAVENOUS AS NEEDED
Status: DISCONTINUED | OUTPATIENT
Start: 2024-09-06 | End: 2024-09-06

## 2024-09-06 RX ORDER — ROCURONIUM BROMIDE 10 MG/ML
INJECTION, SOLUTION INTRAVENOUS AS NEEDED
Status: DISCONTINUED | OUTPATIENT
Start: 2024-09-06 | End: 2024-09-06

## 2024-09-06 RX ORDER — FENTANYL CITRATE/PF 50 MCG/ML
25 SYRINGE (ML) INJECTION
Status: COMPLETED | OUTPATIENT
Start: 2024-09-06 | End: 2024-09-06

## 2024-09-06 RX ORDER — MIDAZOLAM HYDROCHLORIDE 2 MG/2ML
INJECTION, SOLUTION INTRAMUSCULAR; INTRAVENOUS AS NEEDED
Status: DISCONTINUED | OUTPATIENT
Start: 2024-09-06 | End: 2024-09-06

## 2024-09-06 RX ORDER — LIDOCAINE HYDROCHLORIDE 20 MG/ML
INJECTION, SOLUTION EPIDURAL; INFILTRATION; INTRACAUDAL; PERINEURAL AS NEEDED
Status: DISCONTINUED | OUTPATIENT
Start: 2024-09-06 | End: 2024-09-06

## 2024-09-06 RX ORDER — DEXAMETHASONE SODIUM PHOSPHATE 10 MG/ML
10 INJECTION, SOLUTION INTRAMUSCULAR; INTRAVENOUS ONCE
Status: DISCONTINUED | OUTPATIENT
Start: 2024-09-07 | End: 2024-09-07

## 2024-09-06 RX ORDER — DEXAMETHASONE SODIUM PHOSPHATE 10 MG/ML
10 INJECTION, SOLUTION INTRAMUSCULAR; INTRAVENOUS ONCE
Status: COMPLETED | OUTPATIENT
Start: 2024-09-06 | End: 2024-09-06

## 2024-09-06 RX ORDER — PROPOFOL 10 MG/ML
INJECTION, EMULSION INTRAVENOUS AS NEEDED
Status: DISCONTINUED | OUTPATIENT
Start: 2024-09-06 | End: 2024-09-06

## 2024-09-06 RX ORDER — ONDANSETRON 2 MG/ML
4 INJECTION INTRAMUSCULAR; INTRAVENOUS ONCE AS NEEDED
Status: DISCONTINUED | OUTPATIENT
Start: 2024-09-06 | End: 2024-09-06 | Stop reason: HOSPADM

## 2024-09-06 RX ORDER — ACETAMINOPHEN 160 MG/5ML
640 SUSPENSION ORAL EVERY 4 HOURS PRN
Status: CANCELLED | OUTPATIENT
Start: 2024-09-06

## 2024-09-06 RX ORDER — OXYCODONE AND ACETAMINOPHEN 5; 325 MG/1; MG/1
1 TABLET ORAL EVERY 4 HOURS PRN
Status: DISCONTINUED | OUTPATIENT
Start: 2024-09-06 | End: 2024-09-07 | Stop reason: HOSPADM

## 2024-09-06 RX ORDER — ONDANSETRON 2 MG/ML
INJECTION INTRAMUSCULAR; INTRAVENOUS AS NEEDED
Status: DISCONTINUED | OUTPATIENT
Start: 2024-09-06 | End: 2024-09-06

## 2024-09-06 RX ORDER — DEXAMETHASONE SODIUM PHOSPHATE 10 MG/ML
INJECTION, SOLUTION INTRAMUSCULAR; INTRAVENOUS AS NEEDED
Status: DISCONTINUED | OUTPATIENT
Start: 2024-09-06 | End: 2024-09-06

## 2024-09-06 RX ORDER — SODIUM CHLORIDE, SODIUM LACTATE, POTASSIUM CHLORIDE, CALCIUM CHLORIDE 600; 310; 30; 20 MG/100ML; MG/100ML; MG/100ML; MG/100ML
100 INJECTION, SOLUTION INTRAVENOUS CONTINUOUS
Status: DISCONTINUED | OUTPATIENT
Start: 2024-09-06 | End: 2024-09-07 | Stop reason: HOSPADM

## 2024-09-06 RX ORDER — SODIUM CHLORIDE, SODIUM LACTATE, POTASSIUM CHLORIDE, CALCIUM CHLORIDE 600; 310; 30; 20 MG/100ML; MG/100ML; MG/100ML; MG/100ML
INJECTION, SOLUTION INTRAVENOUS CONTINUOUS PRN
Status: DISCONTINUED | OUTPATIENT
Start: 2024-09-06 | End: 2024-09-06

## 2024-09-06 RX ORDER — OXYMETAZOLINE HYDROCHLORIDE 0.05 G/100ML
SPRAY NASAL AS NEEDED
Status: DISCONTINUED | OUTPATIENT
Start: 2024-09-06 | End: 2024-09-06 | Stop reason: HOSPADM

## 2024-09-06 RX ORDER — HYDROMORPHONE HCL IN WATER/PF 6 MG/30 ML
0.2 PATIENT CONTROLLED ANALGESIA SYRINGE INTRAVENOUS
Status: DISCONTINUED | OUTPATIENT
Start: 2024-09-06 | End: 2024-09-06 | Stop reason: HOSPADM

## 2024-09-06 RX ORDER — OXYCODONE AND ACETAMINOPHEN 5; 325 MG/1; MG/1
2 TABLET ORAL EVERY 4 HOURS PRN
Status: DISCONTINUED | OUTPATIENT
Start: 2024-09-06 | End: 2024-09-06

## 2024-09-06 RX ADMIN — DEXAMETHASONE SODIUM PHOSPHATE 10 MG: 10 INJECTION, SOLUTION INTRAMUSCULAR; INTRAVENOUS at 12:50

## 2024-09-06 RX ADMIN — HYDROMORPHONE HYDROCHLORIDE 0.2 MG: 0.2 INJECTION, SOLUTION INTRAMUSCULAR; INTRAVENOUS; SUBCUTANEOUS at 16:10

## 2024-09-06 RX ADMIN — FENTANYL CITRATE 50 MCG: 50 INJECTION INTRAMUSCULAR; INTRAVENOUS at 12:41

## 2024-09-06 RX ADMIN — ONDANSETRON 4 MG: 2 INJECTION INTRAMUSCULAR; INTRAVENOUS at 13:55

## 2024-09-06 RX ADMIN — FENTANYL CITRATE 50 MCG: 50 INJECTION INTRAMUSCULAR; INTRAVENOUS at 13:09

## 2024-09-06 RX ADMIN — DEXMEDETOMIDINE 12 MCG: 100 INJECTION, SOLUTION INTRAVENOUS at 12:54

## 2024-09-06 RX ADMIN — OXYCODONE HYDROCHLORIDE AND ACETAMINOPHEN 2 TABLET: 5; 325 TABLET ORAL at 19:35

## 2024-09-06 RX ADMIN — FENTANYL CITRATE 25 MCG: 50 INJECTION INTRAMUSCULAR; INTRAVENOUS at 15:19

## 2024-09-06 RX ADMIN — DEXAMETHASONE SODIUM PHOSPHATE 10 MG: 10 INJECTION, SOLUTION INTRAMUSCULAR; INTRAVENOUS at 18:24

## 2024-09-06 RX ADMIN — OXYCODONE HYDROCHLORIDE AND ACETAMINOPHEN 1 TABLET: 5; 325 TABLET ORAL at 23:22

## 2024-09-06 RX ADMIN — LIDOCAINE HYDROCHLORIDE 80 MG: 20 INJECTION, SOLUTION EPIDURAL; INFILTRATION; INTRACAUDAL at 12:41

## 2024-09-06 RX ADMIN — PROPOFOL 60 MG: 10 INJECTION, EMULSION INTRAVENOUS at 12:43

## 2024-09-06 RX ADMIN — FENTANYL CITRATE 25 MCG: 50 INJECTION INTRAMUSCULAR; INTRAVENOUS at 15:38

## 2024-09-06 RX ADMIN — PROPOFOL 140 MG: 10 INJECTION, EMULSION INTRAVENOUS at 12:41

## 2024-09-06 RX ADMIN — HYDROMORPHONE HYDROCHLORIDE 0.2 MG: 0.2 INJECTION, SOLUTION INTRAMUSCULAR; INTRAVENOUS; SUBCUTANEOUS at 16:03

## 2024-09-06 RX ADMIN — PROPOFOL 50 MCG/KG/MIN: 10 INJECTION, EMULSION INTRAVENOUS at 12:55

## 2024-09-06 RX ADMIN — FENTANYL CITRATE 50 MCG: 50 INJECTION INTRAMUSCULAR; INTRAVENOUS at 13:33

## 2024-09-06 RX ADMIN — DEXMEDETOMIDINE 8 MCG: 100 INJECTION, SOLUTION INTRAVENOUS at 12:48

## 2024-09-06 RX ADMIN — ROCURONIUM BROMIDE 30 MG: 10 INJECTION INTRAVENOUS at 12:42

## 2024-09-06 RX ADMIN — MIDAZOLAM 2 MG: 1 INJECTION INTRAMUSCULAR; INTRAVENOUS at 12:36

## 2024-09-06 RX ADMIN — FENTANYL CITRATE 25 MCG: 50 INJECTION INTRAMUSCULAR; INTRAVENOUS at 15:45

## 2024-09-06 RX ADMIN — FENTANYL CITRATE 25 MCG: 50 INJECTION INTRAMUSCULAR; INTRAVENOUS at 15:50

## 2024-09-06 RX ADMIN — FENTANYL CITRATE 50 MCG: 50 INJECTION INTRAMUSCULAR; INTRAVENOUS at 13:42

## 2024-09-06 RX ADMIN — SODIUM CHLORIDE, SODIUM LACTATE, POTASSIUM CHLORIDE, AND CALCIUM CHLORIDE: .6; .31; .03; .02 INJECTION, SOLUTION INTRAVENOUS at 12:37

## 2024-09-06 RX ADMIN — SODIUM CHLORIDE, SODIUM LACTATE, POTASSIUM CHLORIDE, AND CALCIUM CHLORIDE: .6; .31; .03; .02 INJECTION, SOLUTION INTRAVENOUS at 13:30

## 2024-09-06 RX ADMIN — SUGAMMADEX 160 MG: 100 INJECTION, SOLUTION INTRAVENOUS at 14:45

## 2024-09-06 NOTE — H&P
Surgery Pre-op note/Updated History and Physical    Date of service: 9/6/202411:24 AM      No changes from most recent clinic H&P note. Patient to OR for tonsillectomy and adenoidectomy.      Pmh: Reviewed  Pshx: Reviewed  Social history: Reviewed  Medications: Reviewed  ROS: as above      Vitals:    09/06/24 1102   BP: 113/62   Pulse: 85   Resp: 18   Temp: 97.7 °F (36.5 °C)   SpO2: 98%       ENT: unchanged from previous, oropharynx clear  Chest/Heart/Lungs: Breathing, perfused, unremarkable  Abd: Unremarkable  Ext: Unremarkable        The procedure was discussed with the patient, including risks, benefits, and alternatives and all questions were answered. Consent signed and in the chart.    Patricia Tran M.D.  PGY-1  Otolaryngology, Head and Neck Surgery    9/6/2024 11:24 AM

## 2024-09-06 NOTE — ANESTHESIA PREPROCEDURE EVALUATION
Procedure:  TONSILLECTOMY & ADENOIDECTOMY (Throat)    Relevant Problems   ENDO   (+) Hypothyroidism due to Hashimoto's thyroiditis        Physical Exam    Airway    Mallampati score: I  TM Distance: >3 FB  Neck ROM: full     Dental   No notable dental hx     Cardiovascular  Cardiovascular exam normal    Pulmonary  Pulmonary exam normal     Other Findings  post-pubertal.      Anesthesia Plan  ASA Score- 2     Anesthesia Type- general with ASA Monitors.         Additional Monitors:     Airway Plan: ETT.           Plan Factors-Exercise tolerance (METS): >4 METS.    Chart reviewed.   Existing labs reviewed. Patient summary reviewed.    Patient is not a current smoker.              Induction- intravenous.    Postoperative Plan- Plan for postoperative opioid use. Planned trial extubation    Perioperative Resuscitation Plan - Level 1 - Full Code.       Informed Consent- Anesthetic plan and risks discussed with patient.  I personally reviewed this patient with the CRNA. Discussed and agreed on the Anesthesia Plan with the CRNA..

## 2024-09-06 NOTE — DISCHARGE INSTR - AVS FIRST PAGE
Tonsillectomy and Adenoidectomy Postoperative Instructions    What to expect:  -Pink or blood streaked saliva during the first 24 hours  -Patient may refuse to eat or drink anything by mouth.  Limited food intake is acceptable in the first 2-3 days as long as he or she is drinking plenty of fluids (urine remains light yellow or clear).  Offer sips of liquid (water, juice, Gatorade, Pedialyte) every hour.  -Bad breath  -White/Yellow/Gray coating in the back of the throat  -Pain with swallowing/talking  -Ear pain    What to Avoid x 2 weeks:  -Do Not eat foods with sharp edges or crunchy coatings for 2 weeks following surgery; Stick with soft/mushy foods (pasta, mashed potatoes, baked chicken, cooked vegetables, pudding, etc.)  -Do Not drink fluids with red dye since it can look like blood  -Do Not eat or drink anything that is hot or acidic (orange juice, soda, etc.)  -Do Not gargle  -Do Not strain or lift anything heavy  -Do Not take aspirin or blood thinners until instructed to do so by your doctor    When to call the doctor or go to Emergency Room:  -Bright red blood coming from the mouth or nose  -Coughing up dark blood or blood clots  -Shortness of breath  -Persistent nausea/vomiting  -Temperature above 101 F  -Feeling faint or dizzy  -Decreased urine output compared to before surgery     Follow up with your doctor in 2-3 weeks, or as instructed.  -[facility]:  [phone]  -[facility]:  [phone]  -[facility]:  [phone]  -[facility]:  [phone]     Medications    Initially, Acetaminophen (Tylenol)  20 mL (of 160mg/5mL) by mouth every 6 hours  (10mg/kg/dose)    Starting on day 3, use alternating doses of Acetaminophen (Tylenol) and Ibuprofen (Motrin) every 3 hours.     Example:  9:00 am 12:00 pm 3:00 pm 6:00 pm 9:00 pm 12:00 am 3:00 am 6:00 am 9:00 am   Tylenol Motrin Tylenol Motrin Tylenol Motrin Tylenol Motrin Tylenol       Acetaminophen  (Tylenol)  20 mL (of 160mg/5mL) by mouth every 6 hours  (10mg/kg/dose)    Alternating with:    Ibuprofen (Motrin)  20 mL (of 100mg/5mL) by mouth every 6 hours  (5-10mg/kg/dose, not to exceed 40 mg/kg/day)      Use ONLY as needed for breakthrough pain (>12 years old):    Hydrocodone/Acetaminophen    1-2 tablets (of 325-5mg) by mouth every 4 hours as needed  (0.1mg/kg/dose hydrocodone)      NOTE: Do not exceed more than 2 grams of Acetaminophen in 24 hours if under 12 years old, or 3-4 grams of Acetaminophen in 24 hours if over 12 years old.  Do not take more than 1 medication containing acetaminophen (Tylenol) at the same time.

## 2024-09-06 NOTE — OP NOTE
OPERATIVE REPORT  PATIENT NAME: Alejandro Lawler    :  1995  MRN: [id number]  Pt Location: AN OR ROOM 02    SURGERY DATE: 2024    Surgeons and Role:     * Cortes Gonzalez MD - Primary     * Patricia Tran MD - Assisting    Preop Diagnosis:  Snoring [R06.83]  Chronic tonsillitis [J35.01]    Post-Op Diagnosis Codes:     * Snoring [R06.83]     * Chronic tonsillitis [J35.01]    Procedure(s):  Bilateral - TONSILLECTOMY & ADENOIDECTOMY    Specimen(s):  ID Type Source Tests Collected by Time Destination   1 : RIGHT TONSIL Tissue Tonsil TISSUE EXAM Cortes Gonzalez MD 2024 9472    2 : LEFT TONSIL Tissue Tonsil TISSUE EXAM Cortes Gonzalez MD 2024 1350        Estimated Blood Loss:   Minimal    Drains:  * No LDAs found *    Anesthesia Type:   General    Operative Indications:  Snoring [R06.83]  Chronic tonsillitis [J35.01]      Operative Findings:   Inflamed tonsils bilaterally      Complications:   None    Procedure and Technique:The patient was positively identified and transferred onto the operating table in the supine position. Appropriate monitoring devices were put in place, anesthesia was induced and the patient was intubated without difficulty.  Before proceeding further, the time-out procedure was completed.          The operating room table was then turned 90 degrees, and a shoulder roll was placed. Before proceeding further, a separate time out was taken before starting surgery at a second anatomic site. A McIvor oral gag was introduced opened and suspended from the edge of the Hinojosa stand. Palpation of the hard palate revealed no submucosal cleft. Red rubber tubes were passed through bilateral nasal cavities and used to retract the soft palate bilaterally. The right tonsil was grasped, retracted medially and dissected free of the surrounding tissue using the bovie  wand. In a similar fashion, the left tonsil was removed, and hemostasis was accomplished in bilateral tonsillar fossae using  the coagulation function of the bovie  wand.    Attention was directed to the nasopharynx, where enlarged adenoids were evident.  Adenoid tissue was removed, and hemostasis was accomplished using the suction cautery    Considerable blleding was encountered on both sides.  This was addressed with franc seal as well as suction cautery on the right side.  On the left side bleeding was brisk and therefore in addition to floseal , two 2-0 silk stitches were placed.  In addition the tonsillar pillars on the left side were approximated with 2-0 vicryl.  The McIvor oral gag was let down for a minute and reopened. Good hemostasis was noted. . The red rubber tubes and the McIvor oral gag were then removed. Anesthesia was reversed. The patient was awakened, extubated and taken to the recovery room in stable condition. All counts were correct at the end of the case, and no complications were encountered.     I was present for the entire procedure.    Patient Disposition:  PACU              SIGNATURE: Cortes Gonzalez MD  DATE: September 6, 2024  TIME: 3:03 PM

## 2024-09-06 NOTE — ANESTHESIA POSTPROCEDURE EVALUATION
Post-Op Assessment Note    CV Status:  Stable    Pain management: adequate       Mental Status:  Sleepy   Hydration Status:  Euvolemic   PONV Controlled:  Controlled   Airway Patency:  Patent     Post Op Vitals Reviewed: Yes    No anethesia notable event occurred.    Staff: CRNA               BP   126/75   Temp   97   Pulse  74   Resp      SpO2   95 RA

## 2024-09-07 VITALS
OXYGEN SATURATION: 97 % | DIASTOLIC BLOOD PRESSURE: 78 MMHG | RESPIRATION RATE: 16 BRPM | HEIGHT: 61 IN | SYSTOLIC BLOOD PRESSURE: 116 MMHG | WEIGHT: 176 LBS | TEMPERATURE: 99 F | BODY MASS INDEX: 33.23 KG/M2 | HEART RATE: 92 BPM

## 2024-09-07 PROBLEM — Z90.89 S/P TONSILLECTOMY: Status: ACTIVE | Noted: 2024-09-07

## 2024-09-07 LAB
ERYTHROCYTE [DISTWIDTH] IN BLOOD BY AUTOMATED COUNT: 12.7 % (ref 11.6–15.1)
HCT VFR BLD AUTO: 34.1 % (ref 34.8–46.1)
HGB BLD-MCNC: 10.9 G/DL (ref 11.5–15.4)
MCH RBC QN AUTO: 28.7 PG (ref 26.8–34.3)
MCHC RBC AUTO-ENTMCNC: 32 G/DL (ref 31.4–37.4)
MCV RBC AUTO: 90 FL (ref 82–98)
PLATELET # BLD AUTO: 242 THOUSANDS/UL (ref 149–390)
PMV BLD AUTO: 9.8 FL (ref 8.9–12.7)
RBC # BLD AUTO: 3.8 MILLION/UL (ref 3.81–5.12)
WBC # BLD AUTO: 8.54 THOUSAND/UL (ref 4.31–10.16)

## 2024-09-07 PROCEDURE — 85027 COMPLETE CBC AUTOMATED: CPT

## 2024-09-07 RX ORDER — IBUPROFEN 100 MG/5ML
20 SUSPENSION, ORAL (FINAL DOSE FORM) ORAL EVERY 6 HOURS PRN
Qty: 473 ML | Refills: 1 | Status: SHIPPED | OUTPATIENT
Start: 2024-09-07 | End: 2024-09-15

## 2024-09-07 RX ORDER — SODIUM CHLORIDE, SODIUM GLUCONATE, SODIUM ACETATE, POTASSIUM CHLORIDE, MAGNESIUM CHLORIDE, SODIUM PHOSPHATE, DIBASIC, AND POTASSIUM PHOSPHATE .53; .5; .37; .037; .03; .012; .00082 G/100ML; G/100ML; G/100ML; G/100ML; G/100ML; G/100ML; G/100ML
1000 INJECTION, SOLUTION INTRAVENOUS ONCE
Status: COMPLETED | OUTPATIENT
Start: 2024-09-07 | End: 2024-09-07

## 2024-09-07 RX ORDER — ONDANSETRON 4 MG/1
4 TABLET, ORALLY DISINTEGRATING ORAL EVERY 6 HOURS PRN
Status: DISCONTINUED | OUTPATIENT
Start: 2024-09-07 | End: 2024-09-07 | Stop reason: HOSPADM

## 2024-09-07 RX ORDER — ACETAMINOPHEN 160 MG/5ML
640 SUSPENSION ORAL EVERY 4 HOURS PRN
Qty: 473 ML | Refills: 1 | Status: SHIPPED | OUTPATIENT
Start: 2024-09-07 | End: 2024-09-15

## 2024-09-07 RX ORDER — DEXAMETHASONE SODIUM PHOSPHATE 10 MG/ML
10 INJECTION, SOLUTION INTRAMUSCULAR; INTRAVENOUS ONCE
Status: COMPLETED | OUTPATIENT
Start: 2024-09-07 | End: 2024-09-07

## 2024-09-07 RX ADMIN — SODIUM CHLORIDE, SODIUM LACTATE, POTASSIUM CHLORIDE, AND CALCIUM CHLORIDE 100 ML/HR: .6; .31; .03; .02 INJECTION, SOLUTION INTRAVENOUS at 05:48

## 2024-09-07 RX ADMIN — DEXAMETHASONE SODIUM PHOSPHATE 10 MG: 10 INJECTION INTRAMUSCULAR; INTRAVENOUS at 08:45

## 2024-09-07 RX ADMIN — SODIUM CHLORIDE, SODIUM GLUCONATE, SODIUM ACETATE, POTASSIUM CHLORIDE, MAGNESIUM CHLORIDE, SODIUM PHOSPHATE, DIBASIC, AND POTASSIUM PHOSPHATE 1000 ML: .53; .5; .37; .037; .03; .012; .00082 INJECTION, SOLUTION INTRAVENOUS at 09:09

## 2024-09-07 RX ADMIN — ACETAMINOPHEN 640 MG: 650 SUSPENSION ORAL at 09:07

## 2024-09-07 RX ADMIN — ONDANSETRON 4 MG: 4 TABLET, ORALLY DISINTEGRATING ORAL at 08:55

## 2024-09-07 RX ADMIN — OXYCODONE HYDROCHLORIDE AND ACETAMINOPHEN 1 TABLET: 5; 325 TABLET ORAL at 05:42

## 2024-09-07 NOTE — PROGRESS NOTES
"OTOLARYNGOLOGY PROGRESS NOTE    Date of Service: 9/7/2024 6:45 AM    HPI  Patient is a 29 y.o. female s/p T&A POD 1    Overnight Events: no acute events overnight, progressing as expected postoperatively. Eating and drinking well, no desaturations overnight     PHYSICAL EXAM:  Vitals:    09/07/24 0354   BP: 109/75   Pulse: 91   Resp: 17   Temp: 98.1 °F (36.7 °C)   SpO2: 97%        General: No acute distress  HEENT: healing tonsillar fossa   Neurology: No focal deficits  Lungs: Breathing easy, unlabored and even on room air  Cardio: well perfused        Intake/Output Summary (Last 24 hours) at 9/7/2024 0645  Last data filed at 9/7/2024 0549  Gross per 24 hour   Intake 2920 ml   Output 3100 ml   Net -180 ml         LABORATORY    Recent Labs     09/07/24  0446   WBC 8.54   HGB 10.9*   HCT 34.1*          No results for input(s): \"NA\", \"K\", \"CL\", \"BUN\", \"CREAT\", \"PHOS\", \"MG\" in the last 72 hours.    Invalid input(s): \"TCO2\", \"GLU\", \"CA\", \"CAIONIZ\"    Invalid input(s): \"PTPAT\", \"PTINR\", \"APTTMNNM\", \"APTTPAT\"      Patient Active Problem List   Diagnosis    Hypothyroidism due to Hashimoto's thyroiditis    Bilateral carpal tunnel syndrome    Cubital tunnel syndrome on right    HPV in female    Cervical radiculopathy    Dysfunction of both eustachian tubes    ASNHL (asymmetrical sensorineural hearing loss)    DNS (deviated nasal septum)    AR (allergic rhinitis)    Class 1 obesity due to excess calories without serious comorbidity with body mass index (BMI) of 32.0 to 32.9 in adult    Encounter for screening mammogram for malignant neoplasm of breast    Heat rash    Chronic tonsillitis    Snoring         ASSESSMENT  Patient is a 29 y.o. female w/ acute and chronic problems as above, who is POD 1 s/p T&A doing well postoperatively with no acute events overnight     PLAN  - ok to dc following eval from Dr. Gonzalez  - decadron 10mg x1  - reviewed postoperative instructions including PO hydration, pain control, risk of " bleeding   - follow up in office at previously scheduled postop appointment     Marlee Hua MD PGY-2  [facility] Otolaryngology - Head and Neck Surgery  Available on Epic Secure Chat.  Please contact ENT Resident [employer] chat Role for any questions or concerns.

## 2024-09-07 NOTE — PROGRESS NOTES
Post op day one following tonsillectomy    No bleeding overnight      Pt awake and alert , c/o considerable pain and odynophagia      Imp: stable post op day one, following adenotonsillectomy     Plan:     ok to discharge today after she receives an additional liter of IV fluid as well as 8mg more iv decadron.     Expressed to the patient my concern about potential post op bleeding given the more than usual amount of intra operative bleeding during the procedure. I  Instructed carefully to follow clear liquid and soft diet instructions.  I will follow up with patient later today and through the next few days  via text message and phone    She will be seen next week in my office

## 2024-09-07 NOTE — PLAN OF CARE
Problem: PAIN - ADULT  Goal: Verbalizes/displays adequate comfort level or baseline comfort level  Description: Interventions:  - Encourage patient to monitor pain and request assistance  - Assess pain using appropriate pain scale  - Administer analgesics based on type and severity of pain and evaluate response  - Implement non-pharmacological measures as appropriate and evaluate response  - Consider cultural and social influences on pain and pain management  - Notify physician/advanced practitioner if interventions unsuccessful or patient reports new pain  Outcome: Adequate for Discharge     Problem: INFECTION - ADULT  Goal: Absence or prevention of progression during hospitalization  Description: INTERVENTIONS:  - Assess and monitor for signs and symptoms of infection  - Monitor lab/diagnostic results  - Monitor all insertion sites, i.e. indwelling lines, tubes, and drains  - Monitor endotracheal if appropriate and nasal secretions for changes in amount and color  - [place] appropriate cooling/warming therapies per order  - Administer medications as ordered  - Instruct and encourage patient and family to use good hand hygiene technique  - Identify and instruct in appropriate isolation precautions for identified infection/condition  Outcome: Adequate for Discharge     Problem: SAFETY ADULT  Goal: Patient will remain free of falls  Description: INTERVENTIONS:  - Educate patient/family on patient safety including physical limitations  - Instruct patient to call for assistance with activity   - Consult OT/PT to assist with strengthening/mobility   - Keep Call bell within reach  - Keep bed low and locked with side rails adjusted as appropriate  - Keep care items and personal belongings within reach  - Initiate and maintain comfort rounds  - Make Fall Risk Sign visible to staff  - Apply yellow socks and bracelet for high fall risk patients  - Consider moving patient to room near nurses station  Outcome: Adequate for  Discharge     Problem: DISCHARGE PLANNING  Goal: Discharge to home or other facility with appropriate resources  Description: INTERVENTIONS:  - Identify barriers to discharge w/patient and caregiver  - Arrange for needed discharge resources and transportation as appropriate  - Identify discharge learning needs (meds, wound care, etc.)  - Refer to Case Management Department for coordinating discharge planning if the patient needs post-hospital services based on physician/advanced practitioner order or complex needs related to functional status, cognitive ability, or social support system  Outcome: Adequate for Discharge     Problem: Knowledge Deficit  Goal: Patient/family/caregiver demonstrates understanding of disease process, treatment plan, medications, and discharge instructions  Description: Complete learning assessment and assess knowledge base.  Interventions:  - Provide teaching at level of understanding  - Provide teaching via preferred learning methods  Outcome: Adequate for Discharge

## 2024-09-07 NOTE — PLAN OF CARE
Problem: PAIN - ADULT  Goal: Verbalizes/displays adequate comfort level or baseline comfort level  Description: Interventions:  - Encourage patient to monitor pain and request assistance  - Assess pain using appropriate pain scale  - Administer analgesics based on type and severity of pain and evaluate response  - Implement non-pharmacological measures as appropriate and evaluate response  - Consider cultural and social influences on pain and pain management  - Notify physician/advanced practitioner if interventions unsuccessful or patient reports new pain  Outcome: Progressing     Problem: INFECTION - ADULT  Goal: Absence or prevention of progression during hospitalization  Description: INTERVENTIONS:  - Assess and monitor for signs and symptoms of infection  - Monitor lab/diagnostic results  - Monitor all insertion sites, i.e. indwelling lines, tubes, and drains  - Monitor endotracheal if appropriate and nasal secretions for changes in amount and color  - [place] appropriate cooling/warming therapies per order  - Administer medications as ordered  - Instruct and encourage patient and family to use good hand hygiene technique  - Identify and instruct in appropriate isolation precautions for identified infection/condition  Outcome: Progressing     Problem: SAFETY ADULT  Goal: Patient will remain free of falls  Description: INTERVENTIONS:  - Educate patient/family on patient safety including physical limitations  - Instruct patient to call for assistance with activity   - Consult OT/PT to assist with strengthening/mobility   - Keep Call bell within reach  - Keep bed low and locked with side rails adjusted as appropriate  - Keep care items and personal belongings within reach  - Initiate and maintain comfort rounds  - Make Fall Risk Sign visible to staff  - Offer Toileting every 2 Hours, in advance of need  - Initiate/Maintain bed alarm  - Obtain necessary fall risk management equipment  - Apply yellow socks and  bracelet for high fall risk patients  - Consider moving patient to room near nurses station  Outcome: Progressing     Problem: DISCHARGE PLANNING  Goal: Discharge to home or other facility with appropriate resources  Description: INTERVENTIONS:  - Identify barriers to discharge w/patient and caregiver  - Arrange for needed discharge resources and transportation as appropriate  - Identify discharge learning needs (meds, wound care, etc.)  - Arrange for interpretive services to assist at discharge as needed  - Refer to Case Management Department for coordinating discharge planning if the patient needs post-hospital services based on physician/advanced practitioner order or complex needs related to functional status, cognitive ability, or social support system  Outcome: Progressing     Problem: Knowledge Deficit  Goal: Patient/family/caregiver demonstrates understanding of disease process, treatment plan, medications, and discharge instructions  Description: Complete learning assessment and assess knowledge base.  Interventions:  - Provide teaching at level of understanding  - Provide teaching via preferred learning methods  Outcome: Progressing

## 2024-09-13 PROCEDURE — 88365 INSITU HYBRIDIZATION (FISH): CPT | Performed by: STUDENT IN AN ORGANIZED HEALTH CARE EDUCATION/TRAINING PROGRAM

## 2024-09-13 PROCEDURE — 88304 TISSUE EXAM BY PATHOLOGIST: CPT | Performed by: STUDENT IN AN ORGANIZED HEALTH CARE EDUCATION/TRAINING PROGRAM

## 2024-09-13 PROCEDURE — 88364 INSITU HYBRIDIZATION (FISH): CPT | Performed by: STUDENT IN AN ORGANIZED HEALTH CARE EDUCATION/TRAINING PROGRAM

## 2024-09-13 PROCEDURE — 88360 TUMOR IMMUNOHISTOCHEM/MANUAL: CPT | Performed by: STUDENT IN AN ORGANIZED HEALTH CARE EDUCATION/TRAINING PROGRAM

## 2024-09-13 PROCEDURE — 88341 IMHCHEM/IMCYTCHM EA ADD ANTB: CPT | Performed by: STUDENT IN AN ORGANIZED HEALTH CARE EDUCATION/TRAINING PROGRAM

## 2024-09-13 PROCEDURE — 88342 IMHCHEM/IMCYTCHM 1ST ANTB: CPT | Performed by: STUDENT IN AN ORGANIZED HEALTH CARE EDUCATION/TRAINING PROGRAM

## 2024-09-13 PROCEDURE — 88307 TISSUE EXAM BY PATHOLOGIST: CPT | Performed by: STUDENT IN AN ORGANIZED HEALTH CARE EDUCATION/TRAINING PROGRAM

## 2024-09-15 ENCOUNTER — HOSPITAL ENCOUNTER (OUTPATIENT)
Facility: HOSPITAL | Age: 29
Setting detail: OBSERVATION
Discharge: HOME | End: 2024-09-15
Attending: EMERGENCY MEDICINE | Admitting: OTOLARYNGOLOGY
Payer: COMMERCIAL

## 2024-09-15 VITALS
HEART RATE: 90 BPM | TEMPERATURE: 98 F | RESPIRATION RATE: 15 BRPM | DIASTOLIC BLOOD PRESSURE: 74 MMHG | SYSTOLIC BLOOD PRESSURE: 119 MMHG | OXYGEN SATURATION: 99 %

## 2024-09-15 DIAGNOSIS — Z90.89 S/P TONSILLECTOMY: Primary | ICD-10-CM

## 2024-09-15 DIAGNOSIS — J95.830 POST-TONSILLECTOMY HEMORRHAGE: ICD-10-CM

## 2024-09-15 DIAGNOSIS — G89.18 POST-OP PAIN: ICD-10-CM

## 2024-09-15 PROBLEM — Z98.890 AT RISK FOR BLEEDING ASSOCIATED WITH TONSILLECTOMY AND ADENOIDECTOMY: Status: ACTIVE | Noted: 2024-09-15

## 2024-09-15 PROBLEM — Z91.89 AT RISK FOR BLEEDING ASSOCIATED WITH TONSILLECTOMY AND ADENOIDECTOMY: Status: ACTIVE | Noted: 2024-09-15

## 2024-09-15 LAB
ABO GROUP BLD: NORMAL
ALBUMIN SERPL BCG-MCNC: 4.3 G/DL (ref 3.5–5)
ALP SERPL-CCNC: 59 U/L (ref 34–104)
ALT SERPL W P-5'-P-CCNC: 46 U/L (ref 7–52)
ANION GAP SERPL CALCULATED.3IONS-SCNC: 10 MMOL/L (ref 4–13)
AST SERPL W P-5'-P-CCNC: 25 U/L (ref 13–39)
BASOPHILS # BLD AUTO: 0.02 THOUSANDS/ΜL (ref 0–0.1)
BASOPHILS NFR BLD AUTO: 0 % (ref 0–1)
BILIRUB SERPL-MCNC: 0.4 MG/DL (ref 0.2–1)
BLD GP AB SCN SERPL QL: NEGATIVE
BUN SERPL-MCNC: 8 MG/DL (ref 5–25)
CALCIUM SERPL-MCNC: 9.1 MG/DL (ref 8.4–10.2)
CHLORIDE SERPL-SCNC: 97 MMOL/L (ref 96–108)
CO2 SERPL-SCNC: 28 MMOL/L (ref 21–32)
CREAT SERPL-MCNC: 0.6 MG/DL (ref 0.6–1.3)
EOSINOPHIL # BLD AUTO: 0.03 THOUSAND/ΜL (ref 0–0.61)
EOSINOPHIL NFR BLD AUTO: 1 % (ref 0–6)
ERYTHROCYTE [DISTWIDTH] IN BLOOD BY AUTOMATED COUNT: 12.3 % (ref 11.6–15.1)
GFR SERPL CREATININE-BSD FRML MDRD: 121 ML/MIN/1.73SQ M
GLUCOSE SERPL-MCNC: 114 MG/DL (ref 65–140)
HCT VFR BLD AUTO: 37.8 % (ref 34.8–46.1)
HGB BLD-MCNC: 12.3 G/DL (ref 11.5–15.4)
IMM GRANULOCYTES # BLD AUTO: 0.02 THOUSAND/UL (ref 0–0.2)
IMM GRANULOCYTES NFR BLD AUTO: 0 % (ref 0–2)
LYMPHOCYTES # BLD AUTO: 1.76 THOUSANDS/ΜL (ref 0.6–4.47)
LYMPHOCYTES NFR BLD AUTO: 28 % (ref 14–44)
MCH RBC QN AUTO: 28.3 PG (ref 26.8–34.3)
MCHC RBC AUTO-ENTMCNC: 32.5 G/DL (ref 31.4–37.4)
MCV RBC AUTO: 87 FL (ref 82–98)
MONOCYTES # BLD AUTO: 0.83 THOUSAND/ΜL (ref 0.17–1.22)
MONOCYTES NFR BLD AUTO: 13 % (ref 4–12)
NEUTROPHILS # BLD AUTO: 3.59 THOUSANDS/ΜL (ref 1.85–7.62)
NEUTS SEG NFR BLD AUTO: 58 % (ref 43–75)
NRBC BLD AUTO-RTO: 0 /100 WBCS
PLATELET # BLD AUTO: 272 THOUSANDS/UL (ref 149–390)
PMV BLD AUTO: 8.8 FL (ref 8.9–12.7)
POTASSIUM SERPL-SCNC: 3.6 MMOL/L (ref 3.5–5.3)
PROT SERPL-MCNC: 7.5 G/DL (ref 6.4–8.4)
RBC # BLD AUTO: 4.35 MILLION/UL (ref 3.81–5.12)
RH BLD: NEGATIVE
SODIUM SERPL-SCNC: 135 MMOL/L (ref 135–147)
SPECIMEN EXPIRATION DATE: NORMAL
WBC # BLD AUTO: 6.25 THOUSAND/UL (ref 4.31–10.16)

## 2024-09-15 PROCEDURE — 99221 1ST HOSP IP/OBS SF/LOW 40: CPT | Performed by: OTOLARYNGOLOGY

## 2024-09-15 PROCEDURE — 85025 COMPLETE CBC W/AUTO DIFF WBC: CPT

## 2024-09-15 PROCEDURE — 86901 BLOOD TYPING SEROLOGIC RH(D): CPT

## 2024-09-15 PROCEDURE — 86850 RBC ANTIBODY SCREEN: CPT

## 2024-09-15 PROCEDURE — 99283 EMERGENCY DEPT VISIT LOW MDM: CPT

## 2024-09-15 PROCEDURE — 80053 COMPREHEN METABOLIC PANEL: CPT

## 2024-09-15 PROCEDURE — 36415 COLL VENOUS BLD VENIPUNCTURE: CPT

## 2024-09-15 PROCEDURE — 86900 BLOOD TYPING SEROLOGIC ABO: CPT

## 2024-09-15 PROCEDURE — 99285 EMERGENCY DEPT VISIT HI MDM: CPT | Performed by: EMERGENCY MEDICINE

## 2024-09-15 RX ORDER — ACETAMINOPHEN 160 MG/5ML
650 SUSPENSION ORAL EVERY 6 HOURS PRN
Status: DISCONTINUED | OUTPATIENT
Start: 2024-09-15 | End: 2024-09-15 | Stop reason: HOSPADM

## 2024-09-15 RX ORDER — OXYCODONE HYDROCHLORIDE 5 MG/1
5 TABLET ORAL EVERY 4 HOURS PRN
Status: DISCONTINUED | OUTPATIENT
Start: 2024-09-15 | End: 2024-09-15 | Stop reason: HOSPADM

## 2024-09-15 RX ORDER — TRANEXAMIC ACID 100 MG/ML
1000 INJECTION, SOLUTION INTRAVENOUS ONCE
Status: COMPLETED | OUTPATIENT
Start: 2024-09-15 | End: 2024-09-15

## 2024-09-15 RX ORDER — DEXTROSE MONOHYDRATE, SODIUM CHLORIDE, SODIUM LACTATE, POTASSIUM CHLORIDE, CALCIUM CHLORIDE 5; 600; 310; 179; 20 G/100ML; MG/100ML; MG/100ML; MG/100ML; MG/100ML
100 INJECTION, SOLUTION INTRAVENOUS CONTINUOUS
Status: DISCONTINUED | OUTPATIENT
Start: 2024-09-15 | End: 2024-09-15 | Stop reason: HOSPADM

## 2024-09-15 RX ADMIN — POTASSIUM CHLORIDE 100 ML/HR: 2 INJECTION, SOLUTION, CONCENTRATE INTRAVENOUS at 02:23

## 2024-09-15 RX ADMIN — TRANEXAMIC ACID 1000 MG: 1 INJECTION, SOLUTION INTRAVENOUS at 01:10

## 2024-09-15 RX ADMIN — POTASSIUM CHLORIDE 100 ML/HR: 2 INJECTION, SOLUTION, CONCENTRATE INTRAVENOUS at 11:42

## 2024-09-15 RX ADMIN — OXYCODONE HYDROCHLORIDE 5 MG: 5 TABLET ORAL at 10:55

## 2024-09-15 NOTE — ED ATTENDING ATTESTATION
9/15/2024  I, Russell Last MD, saw and evaluated the patient. I have discussed the patient with the resident/non-physician practitioner and agree with the resident's/non-physician practitioner's findings, Plan of Care, and MDM as documented in the resident's/non-physician practitioner's note, except where noted. All available labs and Radiology studies were reviewed.  I was present for key portions of any procedure(s) performed by the resident/non-physician practitioner and I was immediately available to provide assistance.       At this point I agree with the current assessment done in the Emergency Department.  I have conducted an independent evaluation of this patient a history and physical is as follows:    29-year-old female status post tonsillectomy with Dr. Gonzalez on 9/6/2024 presents to the emergency department for evaluation of post tonsillectomy bleeding.  This episode of bleeding started tonight around 11 PM.  She also endorses subjective fevers and chills as well as generalized weakness.  Does have some nausea, but no vomiting.  She has kids at home with viral symptoms.    On exam, patient was comfortably in bed in no acute distress, head is normocephalic atraumatic, pupils equal round reactive, heart is regular rate and rhythm with intact distal pulses, no increased work of breathing, respiratory distress, or stridor.  Coagulated blood in the posterior oropharynx.  Uvula is midline.    29-year-old female with post tonsillectomy bleeding, ENT at bedside who agrees with nebulized TXA and admission to the hospital for observation.        ED Course         Critical Care Time  Procedures

## 2024-09-15 NOTE — CONSULTS
OTOLARYNGOLOGY CONSULT    Date of Service: 9/15/2024    Reason for consult: Post-tonsillectomy bleeding     ASSESSMENT/PLAN:  Alejandro Lawler is a 29 y.o. female who we are consulted on for tonsil bleeding. Clot from right tonsillar fossa suctioned free. No further bleeding was noted. 6 hours observation was uneventful without repeat bleeding.     -continue IVF  -oxy and tylenol for pain control (NO motrin)   -10 mg decadron 1x dose for pain   -soft diet   -ok to d/c per ENT when tolerating soft diet     HPI  This patient is a 30 yo F with hx of chronic tonsillitis who presented POD9 from tonsillectomy. Intra-op she was noted to have significant bleeding from the left tonsil requiring placement of a stitch for hemostasis. She reports that she tasted blood in her mouth and she felt weak. She called 911 and was transferred to the  ED. Reports spitting up 1 cup of blood mixed with spit. Denies active bleeding at this time. States she has significant pain.     CURRENT HOSPITAL MEDICATIONS  Current Facility-Administered Medications   Medication Dose Route Frequency Provider Last Rate Last Admin    acetaminophen (TYLENOL) oral suspension 650 mg  650 mg Oral Q6H PRN Blossom Bonner MD        dextrose 5% in lactated Ringer's with KCl 20 mEq/L infusion  100 mL/hr Intravenous Continuous Blossom Bonner  mL/hr at 09/15/24 0223 100 mL/hr at 09/15/24 0223    oxyCODONE (ROXICODONE) IR tablet 5 mg  5 mg Oral Q4H PRN Blossom Bonner MD           REVIEW OF SYSTEMS  As above    HISTORIES  PMH:  Past Medical History:   Diagnosis Date    Acid reflux     Disease of thyroid gland     Hashimoto's disease     Neck pain 02/15/2023    Sleep difficulties     Varicella     Yeast dermatitis 2018       PSH:  Past Surgical History:   Procedure Laterality Date     SECTION      MYRINGOTOMY      NOSE SURGERY      ID TONSILLECTOMY & ADENOIDECTOMY AGE 10/> Bilateral 2024    Procedure: TONSILLECTOMY &  ADENOIDECTOMY;  Surgeon: Cortes Gonzalez MD;  Location: [facility];  Service: ENT       SocHx:  Social History     Tobacco Use    Smoking status: Never    Smokeless tobacco: Never   Vaping Use    Vaping status: Never Used   Substance Use Topics    Alcohol use: Never    Drug use: Never       FH:  Family History   Problem Relation Age of Onset    Hashimoto's thyroiditis Mother     Chronic bronchitis Father        ALLERGIES:  No Known Allergies    PHYSICAL EXAM  Visit Vitals  /66   Pulse 88   Temp 98 °F (36.7 °C) (Oral)   Resp 16   SpO2 96%   OB Status Having periods   Smoking Status Never       General: NAD  Ears:  External ears normal in appearance   Nose:  External appearance normal, no septal deviation, anterior bloody crusting  Oral cavity:  large right tonsillar fossa clot without active bleeding, blood tinged tongue and posterior pharyngeal wall   Neck: Trachea is midline; no thyroid nodules, Salivary glands symmetrical, no masses/abnormality on palpation  Lymph:  No cervical lymphadenopathy  Skin:  No obvious facial lesions  Neuro: Motor and sensory grossly intact. Face symmetrical, no obvious cranial nerve palsies,motor and sensory grossly intact, no focal deficits.   Lungs:  Normal work of breathing, symmetrical chest expansion  Vascular: Well perfused      LABORATORY  Reviewed    PROCEDURES  None    RADIOLOGY  None    Patient Active Problem List    Diagnosis Date Noted    S/P tonsillectomy 09/07/2024    Chronic tonsillitis 09/06/2024    Snoring 09/06/2024    ASNHL (asymmetrical sensorineural hearing loss) 06/04/2024    DNS (deviated nasal septum) 06/04/2024    AR (allergic rhinitis) 06/04/2024    Class 1 obesity due to excess calories without serious comorbidity with body mass index (BMI) of 32.0 to 32.9 in adult 06/04/2024    Encounter for screening mammogram for malignant neoplasm of breast 06/04/2024    Heat rash 06/04/2024    Cervical radiculopathy 04/05/2024    Dysfunction of both eustachian  tubes 04/05/2024    Bilateral carpal tunnel syndrome 02/15/2023    Cubital tunnel syndrome on right 02/15/2023    HPV in female 01/11/2023    Hypothyroidism due to Hashimoto's thyroiditis 11/15/2018       Blossom Bonner, PGY3  Otolaryngology- Head and Neck Surgery  Please contact [employer] ENT resident role

## 2024-09-15 NOTE — QUICK NOTE
Quick Note:    Patient tolerating PO. Ok for d/c.     Follow-up with Dr. Gonzalez as scheduled.       Blossom Bonner, PGY3  Otolaryngology- Head and Neck Surgery  Please contact [employer] [facility] ENT resident role

## 2024-09-15 NOTE — DISCHARGE INSTR - AVS FIRST PAGE
Please reference prior instructions provided by Dr. Gonzalez  Soft diet as tolerated, maintain hydration    Follow-up with Dr. Gonzalez as scheduled.

## 2024-09-15 NOTE — PLAN OF CARE
Problem: PAIN - ADULT  Goal: Verbalizes/displays adequate comfort level or baseline comfort level  Description: Interventions:  - Encourage patient to monitor pain and request assistance  - Assess pain using appropriate pain scale  - Administer analgesics based on type and severity of pain and evaluate response  - Implement non-pharmacological measures as appropriate and evaluate response  - Consider cultural and social influences on pain and pain management  - Notify physician/advanced practitioner if interventions unsuccessful or patient reports new pain  Outcome: Progressing     Problem: INFECTION - ADULT  Goal: Absence or prevention of progression during hospitalization  Description: INTERVENTIONS:  - Assess and monitor for signs and symptoms of infection  - Monitor lab/diagnostic results  - Monitor all insertion sites, i.e. indwelling lines, tubes, and drains  - Monitor endotracheal if appropriate and nasal secretions for changes in amount and color  - [place] appropriate cooling/warming therapies per order  - Administer medications as ordered  - Instruct and encourage patient and family to use good hand hygiene technique  - Identify and instruct in appropriate isolation precautions for identified infection/condition  Outcome: Progressing  Goal: Absence of fever/infection during neutropenic period  Description: INTERVENTIONS:  - Monitor WBC    Outcome: Progressing     Problem: SAFETY ADULT  Goal: Patient will remain free of falls  Description: INTERVENTIONS:  - Educate patient/family on patient safety including physical limitations  - Instruct patient to call for assistance with activity   - Consult OT/PT to assist with strengthening/mobility   - Keep Call bell within reach  - Keep bed low and locked with side rails adjusted as appropriate  - Keep care items and personal belongings within reach  - Initiate and maintain comfort rounds  - Make Fall Risk Sign visible to staff  - Offer Toileting every 2 Hours,  in advance of need  - Initiate/Maintain bed alarm  - Apply yellow socks and bracelet for high fall risk patients  - Consider moving patient to room near nurses station  Outcome: Progressing  Goal: Maintain or return to baseline ADL function  Description: INTERVENTIONS:  -  Assess patient's ability to carry out ADLs; assess patient's baseline for ADL function and identify physical deficits which impact ability to perform ADLs (bathing, care of mouth/teeth, toileting, grooming, dressing, etc.)  - Assess/evaluate cause of self-care deficits   - Assess range of motion  - Assess patient's mobility; develop plan if impaired  - Assess patient's need for assistive devices and provide as appropriate  - Encourage maximum independence but intervene and supervise when necessary  - Involve family in performance of ADLs  - Assess for home care needs following discharge   - Consider OT consult to assist with ADL evaluation and planning for discharge  - Provide patient education as appropriate  Outcome: Progressing  Goal: Maintains/Returns to pre admission functional level  Description: INTERVENTIONS:  - Perform AM-PAC 6 Click Basic Mobility/ Daily Activity assessment daily.  - Set and communicate daily mobility goal to care team and patient/family/caregiver.   - Collaborate with rehabilitation services on mobility goals if consulted  - Perform Range of Motion 3 times a day.  - Reposition patient every 2 hours.  - Dangle patient 3 times a day  - Stand patient 2 times a day  - Ambulate patient 3 times a day  - Out of bed to chair 2 times a day   - Out of bed for meals 3 times a day  - Out of bed for toileting  - Record patient progress and toleration of activity level   Outcome: Progressing     Problem: DISCHARGE PLANNING  Goal: Discharge to home or other facility with appropriate resources  Description: INTERVENTIONS:  - Identify barriers to discharge w/patient and caregiver  - Arrange for needed discharge resources and  transportation as appropriate  - Identify discharge learning needs (meds, wound care, etc.)  - Arrange for interpretive services to assist at discharge as needed  - Refer to Case Management Department for coordinating discharge planning if the patient needs post-hospital services based on physician/advanced practitioner order or complex needs related to functional status, cognitive ability, or social support system  Outcome: Progressing     Problem: Knowledge Deficit  Goal: Patient/family/caregiver demonstrates understanding of disease process, treatment plan, medications, and discharge instructions  Description: Complete learning assessment and assess knowledge base.  Interventions:  - Provide teaching at level of understanding  - Provide teaching via preferred learning methods  Outcome: Progressing

## 2024-09-16 ENCOUNTER — TRANSITIONAL CARE MANAGEMENT (OUTPATIENT)
Dept: FAMILY MEDICINE CLINIC | Facility: CLINIC | Age: 29
End: 2024-09-16

## 2024-09-16 NOTE — ED PROVIDER NOTES
1. S/P tonsillectomy    2. Post-tonsillectomy hemorrhage    3. Post-op pain      ED Disposition       ED Disposition   Admit    Condition   Stable    Date/Time   Sun Sep 15, 2024  1:22 AM    Comment   Case was discussed with ENT and the patient's admission status was agreed to be Admission Status: observation status to the service of Dr. Arreola .               Assessment & Plan       Medical Decision Making  Patient is a 29 y.o. female with PMH of AAS and HL, hypothyroidism, status post tonsillectomy who presents to the ED with complaint of bleeding postoperatively.    Vital signs on arrival within normal limits. On exam patient is alert, oriented, no evidence respiratory distress, she has coagulated blood in the posterior oropharynx but is tolerating secretions, not actively choking /struggling to breathe, no hematemesis or hemoptysis at this time, abdomen soft and nontender, not pale or jaundiced appearing.    History and physical exam most consistent with bleeding in the posterior oropharynx status post tonsillectomy. Plan laboratory evaluation to include CBC/CMP/type and screen, medications include nebulized TXA, consultation to ENT    View ED course above for further discussion on patient workup.     ENT resident evaluated the patient, agrees with plan, recommending cold water swishes as well, recommend observation to ENT service    All labs reviewed and utilized in the medical decision making process  All radiology studies independently viewed by me and interpreted by the radiologist.  I reviewed all testing with the patient.     Upon re-evaluation patient remains hemodynamically stable, status post oral suctioning with ENT water swishes, no evidence of repeat bleeding episodes.    Patient admitted to the ENT service, at time of admission remains hemodynamically stable, patient is status post administration TXA, laboratory patient demonstrating CBC, lites, type and screen processing.       Amount and/or  Complexity of Data Reviewed  Labs: ordered.    Risk  Prescription drug management.  Decision regarding hospitalization.                       Medications   tranexamic acid 100mg/mL (for epistaxis) 1,000 mg (1,000 mg Nasal Given 9/15/24 0110)       History of Present Illness       Patient is a 29-year-old female, is status post tonsillectomy for history of chronic tonsillitis.  Reporting that she was just evaluated in the office yesterday, was told that she was healing well, this evening just prior to arrival she felt sudden pain and discomfort in the mouth, then was choking on blood.  Reports no sneezing/cough or other body reaction to cause increased intrathoracic pressure.  Reporting no recent fevers, no sore throat/cough/congestion prior to this event, no change in home medications.  No chest pain, no difficulty breathing, no abdominal pain nausea vomiting.             Review of Systems        Objective     ED Triage Vitals [09/15/24 0022]   Temperature Pulse Blood Pressure Respirations SpO2 Patient Position - Orthostatic VS   98 °F (36.7 °C) 94 108/60 18 96 % --      Temp Source Heart Rate Source BP Location FiO2 (%) Pain Score    Oral Monitor -- -- 5        Physical Exam  Vitals and nursing note reviewed.   Constitutional:       General: She is not in acute distress.     Appearance: She is well-developed. She is ill-appearing. She is not toxic-appearing.   HENT:      Head: Normocephalic and atraumatic.      Nose: No congestion or rhinorrhea.      Mouth/Throat:      Mouth: Mucous membranes are moist.      Pharynx: Oropharyngeal exudate and posterior oropharyngeal erythema present.      Comments: There is coagulated blood overlying both tonsils, it is erythematous, no active bleeding noted, the posterior oropharynx is difficult to visualize, possible plaque overlying the left tonsil consistent with granulation tissue  Eyes:      Conjunctiva/sclera: Conjunctivae normal.   Cardiovascular:      Rate and Rhythm:  Normal rate and regular rhythm.      Heart sounds: No murmur heard.  Pulmonary:      Effort: Pulmonary effort is normal. No respiratory distress.      Breath sounds: Normal breath sounds. No wheezing, rhonchi or rales.   Abdominal:      Palpations: Abdomen is soft.      Tenderness: There is no abdominal tenderness. There is no guarding or rebound.   Musculoskeletal:         General: No swelling.      Cervical back: Neck supple.   Skin:     General: Skin is warm and dry.      Capillary Refill: Capillary refill takes less than 2 seconds.      Coloration: Skin is not pale.   Neurological:      Mental Status: She is alert.   Psychiatric:         Mood and Affect: Mood normal.         Labs Reviewed   CBC AND DIFFERENTIAL - Abnormal       Result Value    WBC 6.25      RBC 4.35      Hemoglobin 12.3      Hematocrit 37.8      MCV 87      MCH 28.3      MCHC 32.5      RDW 12.3      MPV 8.8 (*)     Platelets 272      nRBC 0      Segmented % 58      Immature Grans % 0      Lymphocytes % 28      Monocytes % 13 (*)     Eosinophils Relative 1      Basophils Relative 0      Absolute Neutrophils 3.59      Absolute Immature Grans 0.02      Absolute Lymphocytes 1.76      Absolute Monocytes 0.83      Eosinophils Absolute 0.03      Basophils Absolute 0.02     COMPREHENSIVE METABOLIC PANEL    Sodium 135      Potassium 3.6      Chloride 97      CO2 28      ANION GAP 10      BUN 8      Creatinine 0.60      Glucose 114      Calcium 9.1      AST 25      ALT 46      Alkaline Phosphatase 59      Total Protein 7.5      Albumin 4.3      Total Bilirubin 0.40      eGFR 121      Narrative:     National Kidney Disease Foundation guidelines for Chronic Kidney Disease (CKD):     Stage 1 with normal or high GFR (GFR > 90 mL/min/1.73 square meters)    Stage 2 Mild CKD (GFR = 60-89 mL/min/1.73 square meters)    Stage 3A Moderate CKD (GFR = 45-59 mL/min/1.73 square meters)    Stage 3B Moderate CKD (GFR = 30-44 mL/min/1.73 square meters)    Stage 4 Severe  CKD (GFR = 15-29 mL/min/1.73 square meters)    Stage 5 End Stage CKD (GFR <15 mL/min/1.73 square meters)  Note: GFR calculation is accurate only with a steady state creatinine   TYPE AND SCREEN    ABO Grouping A      Rh Factor Negative      Antibody Screen Negative      Specimen Expiration Date [id number]       No orders to display       Procedures         Ady Baig DO  09/16/24 0459

## 2024-10-07 PROBLEM — J35.01 CHRONIC TONSILLITIS: Status: RESOLVED | Noted: 2024-09-06 | Resolved: 2024-10-07

## 2024-10-08 ENCOUNTER — OFFICE VISIT (OUTPATIENT)
Dept: URGENT CARE | Facility: CLINIC | Age: 29
End: 2024-10-08
Payer: COMMERCIAL

## 2024-10-08 VITALS
SYSTOLIC BLOOD PRESSURE: 128 MMHG | HEART RATE: 74 BPM | WEIGHT: 169 LBS | HEIGHT: 61 IN | TEMPERATURE: 98 F | OXYGEN SATURATION: 99 % | DIASTOLIC BLOOD PRESSURE: 86 MMHG | RESPIRATION RATE: 18 BRPM | BODY MASS INDEX: 31.91 KG/M2

## 2024-10-08 DIAGNOSIS — R11.0 NAUSEA: Primary | ICD-10-CM

## 2024-10-08 DIAGNOSIS — R19.7 DIARRHEA, UNSPECIFIED TYPE: ICD-10-CM

## 2024-10-08 LAB
SL AMB  POCT GLUCOSE, UA: NORMAL
SL AMB LEUKOCYTE ESTERASE,UA: NORMAL
SL AMB POCT BILIRUBIN,UA: NORMAL
SL AMB POCT BLOOD,UA: NORMAL
SL AMB POCT CLARITY,UA: NORMAL
SL AMB POCT COLOR,UA: NORMAL
SL AMB POCT KETONES,UA: NORMAL
SL AMB POCT NITRITE,UA: NORMAL
SL AMB POCT PH,UA: 5
SL AMB POCT SPECIFIC GRAVITY,UA: 1.02
SL AMB POCT URINE PROTEIN: NORMAL
SL AMB POCT UROBILINOGEN: 0.2

## 2024-10-08 PROCEDURE — 81002 URINALYSIS NONAUTO W/O SCOPE: CPT | Performed by: NURSE PRACTITIONER

## 2024-10-08 PROCEDURE — 99213 OFFICE O/P EST LOW 20 MIN: CPT | Performed by: NURSE PRACTITIONER

## 2024-10-08 RX ORDER — ONDANSETRON 4 MG/1
4 TABLET, FILM COATED ORAL EVERY 8 HOURS PRN
Qty: 20 TABLET | Refills: 0 | Status: SHIPPED | OUTPATIENT
Start: 2024-10-08

## 2024-10-08 RX ORDER — LEVOTHYROXINE SODIUM 150 UG/1
150 TABLET ORAL DAILY
COMMUNITY
End: 2024-10-17

## 2024-10-08 RX ORDER — METRONIDAZOLE 500 MG/1
500 TABLET ORAL 3 TIMES DAILY
Qty: 9 TABLET | Refills: 0 | Status: SHIPPED | OUTPATIENT
Start: 2024-10-08 | End: 2024-10-11

## 2024-10-09 NOTE — PROGRESS NOTES
[facility]        NAME: Alejandro Lawler is a 29 y.o. female  : 1995    MRN: [id number]  DATE: 2024  TIME: 8:57 PM    Assessment and Plan   Nausea [R11.0]  1. Nausea  POCT urine dip    ondansetron (ZOFRAN) 4 mg tablet      2. Diarrhea, unspecified type  metroNIDAZOLE (FLAGYL) 500 mg tablet            Patient Instructions       Urine dip is negative  Take meds as prescribed  Follow up with PCP in 3-5 days.  Proceed to  ER if symptoms worsen.    If tests have been performed at [facility], our office will contact you with results if changes need to be made to the care plan discussed with you at the visit.  You can review your full results on [facility].    Chief Complaint     Chief Complaint   Patient presents with    Nausea     X5 days nausea, abdominal pain, denies fever had chill, headache, night cough, denies chest or head congestion         History of Present Illness       HPI  Reports ongoing symptoms x 5 days. Includes nausea, abd pain, back pain, and increased number of BMs. Usually gets 1 BM per day but now getting 3-4 per day. Denies strong odor. Slightly soft. No blood. No recent travel. Home pregnancy test was negative. Last menstrual period was 10 days ago. States on day 1 of symptoms, she had a bit of itching for a few hrs and resolved on its own. She wonders if it was allergy to Quinoa she ate earlier that day.       Review of Systems   Review of Systems   Constitutional:  Negative for fever.   HENT:  Negative for trouble swallowing.    Respiratory:  Negative for chest tightness and shortness of breath.    Cardiovascular:  Negative for chest pain.   Gastrointestinal:  Positive for abdominal pain, diarrhea and nausea. Negative for vomiting.   Genitourinary:  Negative for difficulty urinating, dysuria, frequency and urgency.   Musculoskeletal:  Positive for back pain.   Neurological:  Negative for light-headedness.         Current Medications       Current Outpatient  "Medications:     levothyroxine 150 mcg tablet, Take 150 mcg by mouth daily, Disp: , Rfl:     metroNIDAZOLE (FLAGYL) 500 mg tablet, Take 1 tablet (500 mg total) by mouth 3 (three) times a day for 3 days, Disp: 9 tablet, Rfl: 0    ondansetron (ZOFRAN) 4 mg tablet, Take 1 tablet (4 mg total) by mouth every 8 (eight) hours as needed for nausea or vomiting, Disp: 20 tablet, Rfl: 0    Lidocaine Viscous HCl (XYLOCAINE) 2 % mucosal solution, Swish and spit 15 mL 4 (four) times a day as needed for mouth pain or discomfort (Patient not taking: Reported on 10/8/2024), Disp: 100 mL, Rfl: 1    oxyCODONE-acetaminophen (Percocet) 5-325 mg per tablet, Take 1 or 2 tabs every 4 hours as needed for moderate to severe post-operative pain. (Patient not taking: Reported on 10/8/2024), Disp: 40 tablet, Rfl: 0    Current Allergies     Allergies as of 10/08/2024    (No Known Allergies)            The following portions of the patient's history were reviewed and updated as appropriate: allergies, current medications, past family history, past medical history, past social history, past surgical history and problem list.     Past Medical History:   Diagnosis Date    Acid reflux     Disease of thyroid gland     Hashimoto's disease     Neck pain 02/15/2023    Sleep difficulties     Varicella     Yeast dermatitis 2018       Past Surgical History:   Procedure Laterality Date    ADENOIDECTOMY       SECTION      MYRINGOTOMY      NOSE SURGERY      NV TONSILLECTOMY & ADENOIDECTOMY AGE 10/> Bilateral 2024    Procedure: TONSILLECTOMY & ADENOIDECTOMY;  Surgeon: Cortes Gonzalez MD;  Location: [facility];  Service: ENT    TONSILLECTOMY         Family History   Problem Relation Age of Onset    Hashimoto's thyroiditis Mother     Chronic bronchitis Father          Medications have been verified.        Objective   /86   Pulse 74   Temp 98 °F (36.7 °C)   Resp 18   Ht 5' 1\" (1.549 m)   Wt 76.7 kg (169 lb)   SpO2 99%   BMI " 31.93 kg/m²   No LMP recorded.       Physical Exam     Physical Exam  Constitutional:       General: She is not in acute distress.     Comments: Looks uncomfortable   Cardiovascular:      Rate and Rhythm: Regular rhythm.      Heart sounds: Normal heart sounds.   Pulmonary:      Effort: Pulmonary effort is normal.      Breath sounds: Normal breath sounds.   Abdominal:      Tenderness: There is generalized abdominal tenderness. There is no rebound.      Comments: Highest pain in the suprapubic area. Negative Rovsing test

## 2024-10-15 DIAGNOSIS — E06.3 HYPOTHYROIDISM DUE TO HASHIMOTO'S THYROIDITIS: Primary | ICD-10-CM

## 2024-10-16 DIAGNOSIS — R19.7 DIARRHEA, UNSPECIFIED TYPE: Primary | ICD-10-CM

## 2024-10-17 DIAGNOSIS — E06.3 HYPOTHYROIDISM DUE TO HASHIMOTO'S THYROIDITIS: Primary | ICD-10-CM

## 2024-10-17 LAB
T3 SERPL-MCNC: 130 NG/DL (ref 71–180)
T4 SERPL-MCNC: 8 UG/DL (ref 4.5–12)
THYROGLOB AB SERPL-ACNC: <1 IU/ML (ref 0–0.9)
THYROPEROXIDASE AB SERPL-ACNC: 11 IU/ML (ref 0–34)
TSH SERPL DL<=0.005 MIU/L-ACNC: 0.14 UIU/ML (ref 0.45–4.5)

## 2024-10-17 RX ORDER — LEVOTHYROXINE SODIUM 125 UG/1
125 TABLET ORAL
Qty: 100 TABLET | Refills: 1 | Status: SHIPPED | OUTPATIENT
Start: 2024-10-17

## 2024-10-25 DIAGNOSIS — R19.7 DIARRHEA, UNSPECIFIED TYPE: Primary | ICD-10-CM

## 2024-10-25 LAB
BASOPHILS # BLD AUTO: 0 X10E3/UL (ref 0–0.2)
BASOPHILS NFR BLD AUTO: 0 %
CALPROTECTIN STL-MCNT: 69 UG/G (ref 0–120)
CRP SERPL-MCNC: <1 MG/L (ref 0–10)
ELASTASE PANC STL-MCNT: >800 UG ELAST./G
EOSINOPHIL # BLD AUTO: 0.1 X10E3/UL (ref 0–0.4)
EOSINOPHIL NFR BLD AUTO: 1 %
ERYTHROCYTE [DISTWIDTH] IN BLOOD BY AUTOMATED COUNT: 12.5 % (ref 11.7–15.4)
ERYTHROCYTE [SEDIMENTATION RATE] IN BLOOD BY WESTERGREN METHOD: 17 MM/HR (ref 0–32)
FAT STL QL: NORMAL
H PYLORI AG STL QL IA: NEGATIVE
HCT VFR BLD AUTO: 37.2 % (ref 34–46.6)
HGB BLD-MCNC: 12.4 G/DL (ref 11.1–15.9)
IMM GRANULOCYTES # BLD: 0 X10E3/UL (ref 0–0.1)
IMM GRANULOCYTES NFR BLD: 0 %
LYMPHOCYTES # BLD AUTO: 2.2 X10E3/UL (ref 0.7–3.1)
LYMPHOCYTES NFR BLD AUTO: 38 %
MCH RBC QN AUTO: 29.1 PG (ref 26.6–33)
MCHC RBC AUTO-ENTMCNC: 33.3 G/DL (ref 31.5–35.7)
MCV RBC AUTO: 87 FL (ref 79–97)
MONOCYTES # BLD AUTO: 0.4 X10E3/UL (ref 0.1–0.9)
MONOCYTES NFR BLD AUTO: 7 %
NEUTRAL FAT STL QL: NORMAL
NEUTROPHILS # BLD AUTO: 3.2 X10E3/UL (ref 1.4–7)
NEUTROPHILS NFR BLD AUTO: 54 %
PLATELET # BLD AUTO: 289 X10E3/UL (ref 150–450)
RBC # BLD AUTO: 4.26 X10E6/UL (ref 3.77–5.28)
WBC # BLD AUTO: 5.9 X10E3/UL (ref 3.4–10.8)

## 2024-10-29 ENCOUNTER — OFFICE VISIT (OUTPATIENT)
Dept: GASTROENTEROLOGY | Facility: CLINIC | Age: 29
End: 2024-10-29
Payer: COMMERCIAL

## 2024-10-29 VITALS
BODY MASS INDEX: 31.53 KG/M2 | SYSTOLIC BLOOD PRESSURE: 110 MMHG | WEIGHT: 167 LBS | TEMPERATURE: 98.8 F | DIASTOLIC BLOOD PRESSURE: 60 MMHG | HEIGHT: 61 IN

## 2024-10-29 DIAGNOSIS — K59.04 CHRONIC IDIOPATHIC CONSTIPATION: ICD-10-CM

## 2024-10-29 DIAGNOSIS — R10.84 GENERALIZED ABDOMINAL PAIN: Primary | ICD-10-CM

## 2024-10-29 DIAGNOSIS — R19.7 DIARRHEA, UNSPECIFIED TYPE: ICD-10-CM

## 2024-10-29 PROCEDURE — 99204 OFFICE O/P NEW MOD 45 MIN: CPT | Performed by: PHYSICIAN ASSISTANT

## 2024-10-29 NOTE — PROGRESS NOTES
"Ambulatory Visit  Name: Alejandro Lawler      : 1995      MRN: [id number]  Encounter Provider: Ayla Palacio PA-C  Encounter Date: 10/29/2024   Encounter department: [facility] [place]    Assessment & Plan  Generalized abdominal pain  Patient says that for the last 2 months she has had constant abdominal pain that she says begins in the periumbilical region but radiates throughout the generalized abdomen.  She says that she was having diarrhea when this first started up to 3-4 times a day however this only lasted for about 2 weeks and she is back to her baseline of being constipated.  She says she usually moves her bowels daily but she only evacuates small \"and hard\" amounts.  She says despite her diarrhea resolving, she and her PCP both would like a colonoscopy done especially due to her chronic constipation. fecal fat, fecal calprotectin, fecal elastase, CRP, H.pylori stool test all WNL. TSH low but T3/T4 WNL, so her synthroid was increased. CBC, CMP WNL. Celiac panel ordered but not done.   -please get celiac serologies done   -RUQ u/s ordered to rule out gallstones   -EGD ordered to rule out PUD, hiatal hernia, celiac   Orders:    Ambulatory Referral to Gastroenterology    EGD; Future    US right upper quadrant; Future    Diarrhea, unspecified type  Resolved. See above.   Orders:    Colonoscopy; Future    Chronic idiopathic constipation  See above.  She says that she usually takes MiraLAX as needed.  -Please ensure you are drinking at least 64 ounces of water a day  -Please increase from over-the-counter MiraLAX use to daily as needed constipation         History of Present Illness     Alejandro Lawler is a 29 y.o. female who presents for consultation of abdominal pain. Patient says that for the last 2 months she has had constant abdominal pain that she says begins in the periumbilical region but radiates throughout the generalized abdomen.  She says that she was " "having diarrhea when this first started up to 3-4 times a day however this only lasted for about 2 weeks and she is back to her baseline of being constipated.  She says she usually moves her bowels daily but she only evacuates small \"and hard\" amounts.  She says despite her diarrhea resolving, she and her PCP both would like a colonoscopy done especially due to her chronic constipation.    History obtained from : patient  Review of Systems  Medical History Reviewed by provider this encounter:       Past Medical History   Past Medical History:   Diagnosis Date    Acid reflux     Disease of thyroid gland     GERD (gastroesophageal reflux disease)     Hashimoto's disease     Lactose intolerance     Neck pain 02/15/2023    Sleep difficulties     Varicella     Yeast dermatitis 2018     Past Surgical History:   Procedure Laterality Date    ADENOIDECTOMY       SECTION      MYRINGOTOMY      NOSE SURGERY      AL TONSILLECTOMY & ADENOIDECTOMY AGE 10/ Bilateral 2024    Procedure: TONSILLECTOMY & ADENOIDECTOMY;  Surgeon: Cortes Gonzalez MD;  [facility];  Service: ENT    TONSILLECTOMY      UPPER GASTROINTESTINAL ENDOSCOPY       Family History   Problem Relation Age of Onset    Hashimoto's thyroiditis Mother     Chronic bronchitis Father     Asthma Father     Heart disease Paternal Grandfather     Asthma Brother      Current Outpatient Medications on File Prior to Visit   Medication Sig Dispense Refill    levothyroxine 125 mcg tablet Take 1 tablet (125 mcg total) by mouth daily in the early morning 100 tablet 1    ondansetron (ZOFRAN) 4 mg tablet Take 1 tablet (4 mg total) by mouth every 8 (eight) hours as needed for nausea or vomiting (Patient not taking: Reported on 10/18/2024) 20 tablet 0     No current facility-administered medications on file prior to visit.   No Known Allergies   Current Outpatient Medications on File Prior to Visit   Medication Sig Dispense Refill    levothyroxine 125 " mcg tablet Take 1 tablet (125 mcg total) by mouth daily in the early morning 100 tablet 1    ondansetron (ZOFRAN) 4 mg tablet Take 1 tablet (4 mg total) by mouth every 8 (eight) hours as needed for nausea or vomiting (Patient not taking: Reported on 10/18/2024) 20 tablet 0     No current facility-administered medications on file prior to visit.      Social History     Tobacco Use    Smoking status: Never    Smokeless tobacco: Never   Vaping Use    Vaping status: Never Used   Substance and Sexual Activity    Alcohol use: No    Drug use: No    Sexual activity: Yes     Partners: Male     Birth control/protection: Condom Male, None         Objective     There were no vitals taken for this visit.    Physical Exam  Administrative Statements   I have spent a total time of 30 minutes in caring for this patient on the day of the visit/encounter including Risks and benefits of tx options, Instructions for management, Patient and family education, Importance of tx compliance, Risk factor reductions, Impressions, Counseling / Coordination of care, Documenting in the medical record, Reviewing / ordering tests, medicine, procedures  , Obtaining or reviewing history  , and Communicating with other healthcare professionals .

## 2024-10-29 NOTE — H&P (VIEW-ONLY)
"Ambulatory Visit  Name: Alejandro Lawler      : 1995      MRN: [id number]  Encounter Provider: Ayla Palacio PA-C  Encounter Date: 10/29/2024   Encounter department: [facility] [place]    Assessment & Plan  Generalized abdominal pain  Patient says that for the last 2 months she has had constant abdominal pain that she says begins in the periumbilical region but radiates throughout the generalized abdomen.  She says that she was having diarrhea when this first started up to 3-4 times a day however this only lasted for about 2 weeks and she is back to her baseline of being constipated.  She says she usually moves her bowels daily but she only evacuates small \"and hard\" amounts.  She says despite her diarrhea resolving, she and her PCP both would like a colonoscopy done especially due to her chronic constipation. fecal fat, fecal calprotectin, fecal elastase, CRP, H.pylori stool test all WNL. TSH low but T3/T4 WNL, so her synthroid was increased. CBC, CMP WNL. Celiac panel ordered but not done.   -please get celiac serologies done   -RUQ u/s ordered to rule out gallstones   -EGD ordered to rule out PUD, hiatal hernia, celiac   Orders:    Ambulatory Referral to Gastroenterology    EGD; Future    US right upper quadrant; Future    Diarrhea, unspecified type  Resolved. See above.   Orders:    Colonoscopy; Future    Chronic idiopathic constipation  See above.  She says that she usually takes MiraLAX as needed.  -Please ensure you are drinking at least 64 ounces of water a day  -Please increase from over-the-counter MiraLAX use to daily as needed constipation         History of Present Illness     Alejandro Lawler is a 29 y.o. female who presents for consultation of abdominal pain. Patient says that for the last 2 months she has had constant abdominal pain that she says begins in the periumbilical region but radiates throughout the generalized abdomen.  She says that she was " "having diarrhea when this first started up to 3-4 times a day however this only lasted for about 2 weeks and she is back to her baseline of being constipated.  She says she usually moves her bowels daily but she only evacuates small \"and hard\" amounts.  She says despite her diarrhea resolving, she and her PCP both would like a colonoscopy done especially due to her chronic constipation.    History obtained from : patient  Review of Systems  Medical History Reviewed by provider this encounter:       Past Medical History   Past Medical History:   Diagnosis Date    Acid reflux     Disease of thyroid gland     GERD (gastroesophageal reflux disease)     Hashimoto's disease     Lactose intolerance     Neck pain 02/15/2023    Sleep difficulties     Varicella     Yeast dermatitis 2018     Past Surgical History:   Procedure Laterality Date    ADENOIDECTOMY       SECTION      MYRINGOTOMY      NOSE SURGERY      CO TONSILLECTOMY & ADENOIDECTOMY AGE 10/ Bilateral 2024    Procedure: TONSILLECTOMY & ADENOIDECTOMY;  Surgeon: Cortes Gonzalez MD;  [facility];  Service: ENT    TONSILLECTOMY      UPPER GASTROINTESTINAL ENDOSCOPY       Family History   Problem Relation Age of Onset    Hashimoto's thyroiditis Mother     Chronic bronchitis Father     Asthma Father     Heart disease Paternal Grandfather     Asthma Brother      Current Outpatient Medications on File Prior to Visit   Medication Sig Dispense Refill    levothyroxine 125 mcg tablet Take 1 tablet (125 mcg total) by mouth daily in the early morning 100 tablet 1    ondansetron (ZOFRAN) 4 mg tablet Take 1 tablet (4 mg total) by mouth every 8 (eight) hours as needed for nausea or vomiting (Patient not taking: Reported on 10/18/2024) 20 tablet 0     No current facility-administered medications on file prior to visit.   No Known Allergies   Current Outpatient Medications on File Prior to Visit   Medication Sig Dispense Refill    levothyroxine 125 " mcg tablet Take 1 tablet (125 mcg total) by mouth daily in the early morning 100 tablet 1    ondansetron (ZOFRAN) 4 mg tablet Take 1 tablet (4 mg total) by mouth every 8 (eight) hours as needed for nausea or vomiting (Patient not taking: Reported on 10/18/2024) 20 tablet 0     No current facility-administered medications on file prior to visit.      Social History     Tobacco Use    Smoking status: Never    Smokeless tobacco: Never   Vaping Use    Vaping status: Never Used   Substance and Sexual Activity    Alcohol use: No    Drug use: No    Sexual activity: Yes     Partners: Male     Birth control/protection: Condom Male, None         Objective     There were no vitals taken for this visit.    Physical Exam  Administrative Statements   I have spent a total time of 30 minutes in caring for this patient on the day of the visit/encounter including Risks and benefits of tx options, Instructions for management, Patient and family education, Importance of tx compliance, Risk factor reductions, Impressions, Counseling / Coordination of care, Documenting in the medical record, Reviewing / ordering tests, medicine, procedures  , Obtaining or reviewing history  , and Communicating with other healthcare professionals .

## 2024-10-29 NOTE — PATIENT INSTRUCTIONS
Make sure you are drinking last least 64 ounces of water/day  Start using the miralax more often: you can even use this every day if needed     Scheduled date of EGD/colonoscopy (as of today):11.05.24  Physician performing EGD/colonoscopy:DR OHM  Location of EGD/colonoscopy:  Desired bowel prep reviewed with patient:JASSI  Instructions reviewed with patient by:ROSEANNA  Clearances:  N/A  Pt will call to schedule U/S

## 2024-11-04 ENCOUNTER — TELEPHONE (OUTPATIENT)
Age: 29
End: 2024-11-04

## 2024-11-04 NOTE — TELEPHONE ENCOUNTER
Pt rescheduled her Colon & EGD to 11/21/24 with Dr. Peterson. pt couldnt find a . notified Angie at [facility] of cancellation for tomorrow.

## 2024-11-05 ENCOUNTER — HOSPITAL ENCOUNTER (OUTPATIENT)
Dept: ULTRASOUND IMAGING | Facility: HOSPITAL | Age: 29
Discharge: HOME | End: 2024-11-05
Payer: COMMERCIAL

## 2024-11-05 DIAGNOSIS — R10.84 GENERALIZED ABDOMINAL PAIN: ICD-10-CM

## 2024-11-05 PROCEDURE — 76705 ECHO EXAM OF ABDOMEN: CPT

## 2024-11-07 ENCOUNTER — CONSULT (OUTPATIENT)
Dept: ENDOCRINOLOGY | Facility: CLINIC | Age: 29
End: 2024-11-07
Payer: COMMERCIAL

## 2024-11-07 ENCOUNTER — ANESTHESIA EVENT (OUTPATIENT)
Dept: ANESTHESIOLOGY | Facility: HOSPITAL | Age: 29
End: 2024-11-07

## 2024-11-07 ENCOUNTER — ANESTHESIA (OUTPATIENT)
Dept: ANESTHESIOLOGY | Facility: HOSPITAL | Age: 29
End: 2024-11-07

## 2024-11-07 VITALS
DIASTOLIC BLOOD PRESSURE: 80 MMHG | SYSTOLIC BLOOD PRESSURE: 120 MMHG | OXYGEN SATURATION: 99 % | WEIGHT: 165 LBS | BODY MASS INDEX: 31.15 KG/M2 | HEIGHT: 61 IN | HEART RATE: 78 BPM

## 2024-11-07 DIAGNOSIS — E55.9 VITAMIN D DEFICIENCY: ICD-10-CM

## 2024-11-07 DIAGNOSIS — R79.89 LOW TSH LEVEL: ICD-10-CM

## 2024-11-07 DIAGNOSIS — E06.3 HYPOTHYROIDISM DUE TO HASHIMOTO'S THYROIDITIS: Primary | ICD-10-CM

## 2024-11-07 PROCEDURE — 99204 OFFICE O/P NEW MOD 45 MIN: CPT | Performed by: INTERNAL MEDICINE

## 2024-11-07 NOTE — ASSESSMENT & PLAN NOTE
Continue levothyroxine 125 mcg daily and she will take it in the morning and empty stomach at least an hour before breakfast.  Repeat thyroid function test after she has been on a consistent dose at least 4 to 6 weeks.  If TSH continues to fluctuate Consider switching to tirosint

## 2024-11-07 NOTE — PROGRESS NOTES
Alejandro Lawler 29 y.o. female MRN: [id number]    Encounter: [id number]      Assessment & Plan     Problem List Items Addressed This Visit          Endocrine    Hypothyroidism due to Hashimoto's thyroiditis - Primary     Continue levothyroxine 125 mcg daily and she will take it in the morning and empty stomach at least an hour before breakfast.  Repeat thyroid function test after she has been on a consistent dose at least 4 to 6 weeks.  If TSH continues to fluctuate Consider switching to tirosint          Relevant Orders    TSH, 3rd generation    T4, free       Other    Low TSH level    Vitamin D deficiency     Currently on vitamin D2 50,000 iu once a month          Relevant Orders    Vitamin D 25 hydroxy        CC:   Hypothyroidism     History of Present Illness     HPI:  29-year-old female with hypothyroidism due to Hashimoto thyroiditis referred here for evaluation.  Hypothyroidism diagnosed as a teenager   Miscarriage 2 year back   Currently on LT4 125 mcg daily FOR 3 WEEKS - before that was on 150 for a few weeks - in the past year levothyroxine dose has ranged between 100-150 -TSH has ranged between 0.1 to 5   Used to between 100-112 mcg prior to 2 years     Has had 4 pregnancies and 2 miscarriages .  Last baby is 16 th month and had 2 miscarriages prior to last pregnancy     Weight gain 20 lbs during last pregnancy Weight lost 16 lbs in sept as she had tonsillectomy     currently on LT4 125 MCG daily -taking it at midnight - before that was taking in morning   No OTC supplements     + nausea  after taking LT4   + fatigue , numbness in hands , constipation for the past week   Sleeping ok   Occasional palpitations if tsh high     Menstrual cycle regular         Uses omeprazole occasionally       Review of Systems    Historical Information   Past Medical History:   Diagnosis Date    Acid reflux     Disease of thyroid gland 2012    GERD (gastroesophageal reflux disease)     Hashimoto's disease     Lactose  "intolerance     Neck pain 02/15/2023    Sleep difficulties     Varicella     Yeast dermatitis 2018     Past Surgical History:   Procedure Laterality Date    ADENOIDECTOMY       SECTION      MYRINGOTOMY      NOSE SURGERY      KS TONSILLECTOMY & ADENOIDECTOMY AGE 10/> Bilateral 2024    Procedure: TONSILLECTOMY & ADENOIDECTOMY;  Surgeon: Cortes Gonzalez MD;  [facility];  Service: ENT    TONSILLECTOMY      UPPER GASTROINTESTINAL ENDOSCOPY       Social History   Social History     Substance and Sexual Activity   Alcohol Use No     Social History     Substance and Sexual Activity   Drug Use No     Social History     Tobacco Use   Smoking Status Never   Smokeless Tobacco Never     Family History:   Family History   Problem Relation Age of Onset    Hashimoto's thyroiditis Mother     Chronic bronchitis Father     Asthma Father     Asthma Brother     Hashimoto's thyroiditis Maternal Aunt     Hashimoto's thyroiditis Maternal Grandmother     Heart disease Paternal Grandfather        Meds/Allergies   Current Outpatient Medications   Medication Sig Dispense Refill    levothyroxine 125 mcg tablet Take 1 tablet (125 mcg total) by mouth daily in the early morning 100 tablet 1     No current facility-administered medications for this visit.     No Known Allergies    Objective   Vitals: Blood pressure 120/80, pulse 78, height 5' 1\" (1.549 m), weight 74.8 kg (165 lb), SpO2 99%, currently breastfeeding.    Physical Exam  Vitals reviewed.   Constitutional:       General: She is not in acute distress.     Appearance: Normal appearance. She is obese. She is not ill-appearing, toxic-appearing or diaphoretic.   HENT:      Head: Normocephalic and atraumatic.   Eyes:      General: No scleral icterus.     Extraocular Movements: Extraocular movements intact.   Cardiovascular:      Rate and Rhythm: Normal rate and regular rhythm.      Heart sounds: Normal heart sounds. No murmur heard.  Pulmonary:      Effort: " "Pulmonary effort is normal. No respiratory distress.      Breath sounds: Normal breath sounds. No wheezing or rales.   Abdominal:      General: There is no distension.      Palpations: Abdomen is soft.      Tenderness: There is no abdominal tenderness.   Musculoskeletal:      Cervical back: Neck supple.      Right lower leg: No edema.      Left lower leg: No edema.   Lymphadenopathy:      Cervical: No cervical adenopathy.   Skin:     General: Skin is warm and dry.   Neurological:      General: No focal deficit present.      Mental Status: She is alert and oriented to person, place, and time.      Gait: Gait normal.   Psychiatric:         Mood and Affect: Mood normal.         Behavior: Behavior normal.         Thought Content: Thought content normal.         Judgment: Judgment normal.       The history was obtained from the review of the chart, patient.    Lab Results:   Lab Results   Component Value Date/Time    TSH 3RD GENERATON 5.928 (H) 08/27/2024 12:44 PM    TSH 3RD GENERATON 0.496 08/09/2024 07:52 AM       Imaging Studies:       Results Review Statement: No pertinent imaging studies reviewed.    Portions of the record may have been created with voice recognition software. Occasional wrong word or \"sound a like\" substitutions may have occurred due to the inherent limitations of voice recognition software. Read the chart carefully and recognize, using context, where substitutions have occurred.    "

## 2024-11-12 LAB
T4 SERPL-MCNC: 9 UG/DL (ref 4.5–12)
TSH SERPL DL<=0.005 MIU/L-ACNC: 0.51 UIU/ML (ref 0.45–4.5)

## 2024-11-15 ENCOUNTER — TELEPHONE (OUTPATIENT)
Dept: GASTROENTEROLOGY | Facility: MEDICAL CENTER | Age: 29
End: 2024-11-15

## 2024-11-15 NOTE — TELEPHONE ENCOUNTER
Confirming Upcoming Procedure: Colonoscopy and EGD on November 21  Physician performing: Dr. Peterson  Location of procedure:  [facility]  Prep: Golytely  Diabetic: No

## 2024-11-15 NOTE — TELEPHONE ENCOUNTER
Spoke with patient; reviewed instructions--knows instructions sent to [facility] in a message to review and to call with any questions.

## 2024-11-19 ENCOUNTER — TELEPHONE (OUTPATIENT)
Dept: GASTROENTEROLOGY | Facility: MEDICAL CENTER | Age: 29
End: 2024-11-19

## 2024-11-19 DIAGNOSIS — R19.7 DIARRHEA, UNSPECIFIED TYPE: ICD-10-CM

## 2024-11-19 DIAGNOSIS — R10.84 GENERALIZED ABDOMINAL PAIN: Primary | ICD-10-CM

## 2024-11-19 DIAGNOSIS — K59.04 CHRONIC IDIOPATHIC CONSTIPATION: ICD-10-CM

## 2024-11-19 NOTE — TELEPHONE ENCOUNTER
Called patient and informed them that the bowel prep was sent to [facility] in [place]. Patient received confirmation already from [facility] and has the instructions previously sent to their MyChart. Knows to call the office with any questions or concerns. Thank you!

## 2024-11-19 NOTE — TELEPHONE ENCOUNTER
Patient called rx refill line stating on Thursday pt sched for colonscopy   Needs new script for bowel preparation sent to   [facility]/pharmacy #[id number] - [place] - [phone] [phone]    [facility] [place]    Any additional information please call patient [phone]

## 2024-11-19 NOTE — TELEPHONE ENCOUNTER
Pt called in requesting am for the procedure and advised she will receive call tomorrow in between 2-6 pm for arrival time on the procedure day.

## 2024-11-21 ENCOUNTER — HOSPITAL ENCOUNTER (OUTPATIENT)
Dept: GASTROENTEROLOGY | Facility: MEDICAL CENTER | Age: 29
Setting detail: OUTPATIENT SURGERY
End: 2024-11-21
Payer: COMMERCIAL

## 2024-11-21 ENCOUNTER — ANESTHESIA (OUTPATIENT)
Dept: GASTROENTEROLOGY | Facility: MEDICAL CENTER | Age: 29
End: 2024-11-21
Payer: COMMERCIAL

## 2024-11-21 ENCOUNTER — ANESTHESIA EVENT (OUTPATIENT)
Dept: GASTROENTEROLOGY | Facility: MEDICAL CENTER | Age: 29
End: 2024-11-21
Payer: COMMERCIAL

## 2024-11-21 VITALS
OXYGEN SATURATION: 100 % | BODY MASS INDEX: 30.36 KG/M2 | HEART RATE: 64 BPM | HEIGHT: 62 IN | DIASTOLIC BLOOD PRESSURE: 57 MMHG | WEIGHT: 165 LBS | RESPIRATION RATE: 18 BRPM | SYSTOLIC BLOOD PRESSURE: 107 MMHG

## 2024-11-21 DIAGNOSIS — R10.84 GENERALIZED ABDOMINAL PAIN: ICD-10-CM

## 2024-11-21 DIAGNOSIS — R19.7 DIARRHEA, UNSPECIFIED TYPE: ICD-10-CM

## 2024-11-21 LAB
EXT PREGNANCY TEST URINE: NEGATIVE
EXT. CONTROL: NORMAL

## 2024-11-21 PROCEDURE — 45385 COLONOSCOPY W/LESION REMOVAL: CPT | Performed by: STUDENT IN AN ORGANIZED HEALTH CARE EDUCATION/TRAINING PROGRAM

## 2024-11-21 PROCEDURE — 88342 IMHCHEM/IMCYTCHM 1ST ANTB: CPT | Performed by: PATHOLOGY

## 2024-11-21 PROCEDURE — 81025 URINE PREGNANCY TEST: CPT | Performed by: ANESTHESIOLOGY

## 2024-11-21 PROCEDURE — 88305 TISSUE EXAM BY PATHOLOGIST: CPT | Performed by: PATHOLOGY

## 2024-11-21 PROCEDURE — 43239 EGD BIOPSY SINGLE/MULTIPLE: CPT | Performed by: STUDENT IN AN ORGANIZED HEALTH CARE EDUCATION/TRAINING PROGRAM

## 2024-11-21 RX ORDER — SODIUM CHLORIDE 9 MG/ML
125 INJECTION, SOLUTION INTRAVENOUS CONTINUOUS
Status: SHIPPED | OUTPATIENT
Start: 2024-11-21

## 2024-11-21 RX ORDER — LIDOCAINE HYDROCHLORIDE 20 MG/ML
INJECTION, SOLUTION EPIDURAL; INFILTRATION; INTRACAUDAL; PERINEURAL AS NEEDED
Status: DISCONTINUED | OUTPATIENT
Start: 2024-11-21 | End: 2024-11-21

## 2024-11-21 RX ORDER — PROPOFOL 10 MG/ML
INJECTION, EMULSION INTRAVENOUS AS NEEDED
Status: DISCONTINUED | OUTPATIENT
Start: 2024-11-21 | End: 2024-11-21

## 2024-11-21 RX ORDER — SODIUM CHLORIDE 9 MG/ML
INJECTION, SOLUTION INTRAVENOUS CONTINUOUS PRN
Status: DISCONTINUED | OUTPATIENT
Start: 2024-11-21 | End: 2024-11-21

## 2024-11-21 RX ORDER — ONDANSETRON 2 MG/ML
4 INJECTION INTRAMUSCULAR; INTRAVENOUS ONCE AS NEEDED
Status: SHIPPED | OUTPATIENT
Start: 2024-11-21

## 2024-11-21 RX ADMIN — PROPOFOL 50 MG: 10 INJECTION, EMULSION INTRAVENOUS at 13:50

## 2024-11-21 RX ADMIN — PROPOFOL 50 MG: 10 INJECTION, EMULSION INTRAVENOUS at 13:52

## 2024-11-21 RX ADMIN — PROPOFOL 50 MG: 10 INJECTION, EMULSION INTRAVENOUS at 13:37

## 2024-11-21 RX ADMIN — PROPOFOL 40 MG: 10 INJECTION, EMULSION INTRAVENOUS at 13:34

## 2024-11-21 RX ADMIN — SODIUM CHLORIDE: 9 INJECTION, SOLUTION INTRAVENOUS at 13:27

## 2024-11-21 RX ADMIN — PROPOFOL 40 MG: 10 INJECTION, EMULSION INTRAVENOUS at 13:35

## 2024-11-21 RX ADMIN — PROPOFOL 50 MG: 10 INJECTION, EMULSION INTRAVENOUS at 13:45

## 2024-11-21 RX ADMIN — PROPOFOL 120 MG: 10 INJECTION, EMULSION INTRAVENOUS at 13:32

## 2024-11-21 RX ADMIN — Medication 40 MG: at 13:49

## 2024-11-21 RX ADMIN — LIDOCAINE HYDROCHLORIDE 100 MG: 20 INJECTION, SOLUTION EPIDURAL; INFILTRATION; INTRACAUDAL; PERINEURAL at 13:32

## 2024-11-21 RX ADMIN — PROPOFOL 50 MG: 10 INJECTION, EMULSION INTRAVENOUS at 13:42

## 2024-11-21 RX ADMIN — PROPOFOL 50 MG: 10 INJECTION, EMULSION INTRAVENOUS at 13:39

## 2024-11-21 NOTE — ANESTHESIA PREPROCEDURE EVALUATION
Procedure:  EGD    Relevant Problems   ENDO   (+) Hypothyroidism due to Hashimoto's thyroiditis        Physical Exam    Airway    Mallampati score: II  TM Distance: >3 FB  Neck ROM: full     Dental   No notable dental hx     Cardiovascular  Rhythm: regular, Rate: normal, No weak pulses    Pulmonary   No stridor    Other Findings  post-pubertal.      Anesthesia Plan  ASA Score- 2     Anesthesia Type- IV sedation with anesthesia with ASA Monitors.         Additional Monitors:     Airway Plan:            Plan Factors-    Chart reviewed.   Existing labs reviewed. Patient summary reviewed.                  Induction- intravenous.    Postoperative Plan-         Informed Consent- Anesthetic plan and risks discussed with patient.  I personally reviewed this patient with the CRNA. Discussed and agreed on the Anesthesia Plan with the CRNA..

## 2024-11-21 NOTE — ANESTHESIA POSTPROCEDURE EVALUATION
Post-Op Assessment Note    CV Status:  Stable    Pain management: adequate       Mental Status:  Alert and awake   Hydration Status:  Euvolemic   PONV Controlled:  Controlled   Airway Patency:  Patent     Post Op Vitals Reviewed: Yes    No anethesia notable event occurred.    Staff: CRNA           Last Filed PACU Vitals:  Vitals Value Taken Time   Temp     Pulse 76 11/21/24 1359   /58 11/21/24 1359   Resp 18 11/21/24 1359   SpO2 100 % 11/21/24 1359       Modified Leonora:  Activity: 2 (11/21/2024  1:05 PM)  Respiration: 2 (11/21/2024  1:05 PM)  Circulation: 2 (11/21/2024  1:05 PM)  Consciousness: 2 (11/21/2024  1:05 PM)  Oxygen Saturation: 2 (11/21/2024  1:05 PM)  Modified Leonora Score: 10 (11/21/2024  1:05 PM)

## 2024-11-21 NOTE — INTERVAL H&P NOTE
H&P reviewed. After examining the patient I find no changes in the patients condition since the H&P had been written.    Vitals:    11/21/24 1308   BP: 117/65   Pulse: 77   Resp: 18   SpO2: 99%

## 2024-11-26 PROCEDURE — 88342 IMHCHEM/IMCYTCHM 1ST ANTB: CPT | Performed by: PATHOLOGY

## 2024-11-26 PROCEDURE — 88305 TISSUE EXAM BY PATHOLOGIST: CPT | Performed by: PATHOLOGY

## 2024-11-29 ENCOUNTER — PATIENT MESSAGE (OUTPATIENT)
Dept: GASTROENTEROLOGY | Facility: CLINIC | Age: 29
End: 2024-11-29

## 2024-11-29 DIAGNOSIS — K29.70 HELICOBACTER PYLORI GASTRITIS: Primary | ICD-10-CM

## 2024-11-29 DIAGNOSIS — B96.81 HELICOBACTER PYLORI GASTRITIS: Primary | ICD-10-CM

## 2024-11-29 RX ORDER — METRONIDAZOLE 250 MG/1
250 TABLET ORAL 4 TIMES DAILY
Qty: 56 TABLET | Refills: 0 | Status: SHIPPED | OUTPATIENT
Start: 2024-11-29 | End: 2024-12-13

## 2024-11-29 RX ORDER — OMEPRAZOLE 40 MG/1
40 CAPSULE, DELAYED RELEASE ORAL 2 TIMES DAILY
Qty: 28 CAPSULE | Refills: 0 | Status: SHIPPED | OUTPATIENT
Start: 2024-11-29 | End: 2024-12-13

## 2024-11-29 RX ORDER — BISMUTH SUBSALICYLATE 262 MG/1
524 TABLET, CHEWABLE ORAL 4 TIMES DAILY
Qty: 112 TABLET | Refills: 0 | Status: SHIPPED | OUTPATIENT
Start: 2024-11-29 | End: 2024-12-13

## 2024-11-29 RX ORDER — TETRACYCLINE HYDROCHLORIDE 500 MG/1
500 CAPSULE ORAL 4 TIMES DAILY
Qty: 56 CAPSULE | Refills: 0 | Status: SHIPPED | OUTPATIENT
Start: 2024-11-29 | End: 2024-12-13

## 2024-11-29 NOTE — PATIENT COMMUNICATION
Called patient to discuss biopsy results showing H. pylori gastritis.  Patient reports she has had H. pylori in the past, at which time she was treated with triple therapy and admits that she took these 3 medications at different times, as her pharmacy filled them at different times.  Was not able to fill them all on the same exact day.    Recommend quadruple therapy; prescriptions sent to patient's pharmacy.  Advised patient to wait until she has all 4 medications in her possession and then to take all 4 as prescribed for 2 weeks.  Patient verbalized understanding with no further questions at this time, and was thankful for phone call.

## 2024-12-02 ENCOUNTER — RESULTS FOLLOW-UP (OUTPATIENT)
Dept: GASTROENTEROLOGY | Facility: MEDICAL CENTER | Age: 29
End: 2024-12-02

## 2025-01-07 ENCOUNTER — HOSPITAL ENCOUNTER (EMERGENCY)
Facility: HOSPITAL | Age: 30
Discharge: HOME | End: 2025-01-07
Attending: EMERGENCY MEDICINE
Payer: COMMERCIAL

## 2025-01-07 ENCOUNTER — NURSE TRIAGE (OUTPATIENT)
Age: 30
End: 2025-01-07

## 2025-01-07 ENCOUNTER — TELEPHONE (OUTPATIENT)
Age: 30
End: 2025-01-07

## 2025-01-07 VITALS
SYSTOLIC BLOOD PRESSURE: 122 MMHG | OXYGEN SATURATION: 99 % | BODY MASS INDEX: 31.65 KG/M2 | RESPIRATION RATE: 19 BRPM | TEMPERATURE: 98.2 F | HEART RATE: 82 BPM | WEIGHT: 173.06 LBS | DIASTOLIC BLOOD PRESSURE: 79 MMHG

## 2025-01-07 DIAGNOSIS — R11.2 NAUSEA AND VOMITING: Primary | ICD-10-CM

## 2025-01-07 LAB
ALBUMIN SERPL BCG-MCNC: 4.5 G/DL (ref 3.5–5)
ALP SERPL-CCNC: 49 U/L (ref 34–104)
ALT SERPL W P-5'-P-CCNC: 49 U/L (ref 7–52)
ANION GAP SERPL CALCULATED.3IONS-SCNC: 7 MMOL/L (ref 4–13)
AST SERPL W P-5'-P-CCNC: 27 U/L (ref 13–39)
BACTERIA UR QL AUTO: ABNORMAL /HPF
BASOPHILS # BLD AUTO: 0.01 THOUSANDS/ΜL (ref 0–0.1)
BASOPHILS NFR BLD AUTO: 0 % (ref 0–1)
BILIRUB DIRECT SERPL-MCNC: 0.06 MG/DL (ref 0–0.2)
BILIRUB SERPL-MCNC: 0.4 MG/DL (ref 0.2–1)
BILIRUB UR QL STRIP: NEGATIVE
BUN SERPL-MCNC: 16 MG/DL (ref 5–25)
CALCIUM SERPL-MCNC: 9.6 MG/DL (ref 8.4–10.2)
CHLORIDE SERPL-SCNC: 103 MMOL/L (ref 96–108)
CLARITY UR: ABNORMAL
CO2 SERPL-SCNC: 28 MMOL/L (ref 21–32)
COLOR UR: ABNORMAL
CREAT SERPL-MCNC: 1.16 MG/DL (ref 0.6–1.3)
EOSINOPHIL # BLD AUTO: 0.03 THOUSAND/ΜL (ref 0–0.61)
EOSINOPHIL NFR BLD AUTO: 0 % (ref 0–6)
ERYTHROCYTE [DISTWIDTH] IN BLOOD BY AUTOMATED COUNT: 12.6 % (ref 11.6–15.1)
EXT PREGNANCY TEST URINE: NEGATIVE
EXT. CONTROL: NORMAL
GFR SERPL CREATININE-BSD FRML MDRD: 62 ML/MIN/1.73SQ M
GLUCOSE SERPL-MCNC: 102 MG/DL (ref 65–140)
GLUCOSE UR STRIP-MCNC: NEGATIVE MG/DL
HCT VFR BLD AUTO: 39.5 % (ref 34.8–46.1)
HGB BLD-MCNC: 12.8 G/DL (ref 11.5–15.4)
HGB UR QL STRIP.AUTO: ABNORMAL
IMM GRANULOCYTES # BLD AUTO: 0.01 THOUSAND/UL (ref 0–0.2)
IMM GRANULOCYTES NFR BLD AUTO: 0 % (ref 0–2)
KETONES UR STRIP-MCNC: ABNORMAL MG/DL
LEUKOCYTE ESTERASE UR QL STRIP: ABNORMAL
LIPASE SERPL-CCNC: 13 U/L (ref 11–82)
LYMPHOCYTES # BLD AUTO: 1.67 THOUSANDS/ΜL (ref 0.6–4.47)
LYMPHOCYTES NFR BLD AUTO: 20 % (ref 14–44)
MAGNESIUM SERPL-MCNC: 2.3 MG/DL (ref 1.9–2.7)
MCH RBC QN AUTO: 27.6 PG (ref 26.8–34.3)
MCHC RBC AUTO-ENTMCNC: 32.4 G/DL (ref 31.4–37.4)
MCV RBC AUTO: 85 FL (ref 82–98)
MONOCYTES # BLD AUTO: 0.44 THOUSAND/ΜL (ref 0.17–1.22)
MONOCYTES NFR BLD AUTO: 5 % (ref 4–12)
NEUTROPHILS # BLD AUTO: 6.29 THOUSANDS/ΜL (ref 1.85–7.62)
NEUTS SEG NFR BLD AUTO: 75 % (ref 43–75)
NITRITE UR QL STRIP: NEGATIVE
NON-SQ EPI CELLS URNS QL MICRO: ABNORMAL /HPF
NRBC BLD AUTO-RTO: 0 /100 WBCS
PH UR STRIP.AUTO: 6 [PH] (ref 4.5–8)
PLATELET # BLD AUTO: 235 THOUSANDS/UL (ref 149–390)
PMV BLD AUTO: 9.4 FL (ref 8.9–12.7)
POTASSIUM SERPL-SCNC: 4.2 MMOL/L (ref 3.5–5.3)
PROT SERPL-MCNC: 8 G/DL (ref 6.4–8.4)
PROT UR STRIP-MCNC: ABNORMAL MG/DL
RBC # BLD AUTO: 4.64 MILLION/UL (ref 3.81–5.12)
RBC #/AREA URNS AUTO: ABNORMAL /HPF
SODIUM SERPL-SCNC: 138 MMOL/L (ref 135–147)
SP GR UR STRIP.AUTO: 1.01 (ref 1–1.03)
UROBILINOGEN UR QL STRIP.AUTO: 0.2 E.U./DL
WBC # BLD AUTO: 8.45 THOUSAND/UL (ref 4.31–10.16)
WBC #/AREA URNS AUTO: ABNORMAL /HPF

## 2025-01-07 PROCEDURE — 80048 BASIC METABOLIC PNL TOTAL CA: CPT | Performed by: EMERGENCY MEDICINE

## 2025-01-07 PROCEDURE — 85025 COMPLETE CBC W/AUTO DIFF WBC: CPT | Performed by: EMERGENCY MEDICINE

## 2025-01-07 PROCEDURE — 99283 EMERGENCY DEPT VISIT LOW MDM: CPT

## 2025-01-07 PROCEDURE — 81025 URINE PREGNANCY TEST: CPT | Performed by: EMERGENCY MEDICINE

## 2025-01-07 PROCEDURE — 96361 HYDRATE IV INFUSION ADD-ON: CPT

## 2025-01-07 PROCEDURE — 99284 EMERGENCY DEPT VISIT MOD MDM: CPT | Performed by: EMERGENCY MEDICINE

## 2025-01-07 PROCEDURE — 80076 HEPATIC FUNCTION PANEL: CPT | Performed by: EMERGENCY MEDICINE

## 2025-01-07 PROCEDURE — 96374 THER/PROPH/DIAG INJ IV PUSH: CPT

## 2025-01-07 PROCEDURE — 87086 URINE CULTURE/COLONY COUNT: CPT

## 2025-01-07 PROCEDURE — 36415 COLL VENOUS BLD VENIPUNCTURE: CPT | Performed by: EMERGENCY MEDICINE

## 2025-01-07 PROCEDURE — 96375 TX/PRO/DX INJ NEW DRUG ADDON: CPT

## 2025-01-07 PROCEDURE — 81001 URINALYSIS AUTO W/SCOPE: CPT

## 2025-01-07 PROCEDURE — 83690 ASSAY OF LIPASE: CPT | Performed by: EMERGENCY MEDICINE

## 2025-01-07 PROCEDURE — 83735 ASSAY OF MAGNESIUM: CPT | Performed by: EMERGENCY MEDICINE

## 2025-01-07 RX ORDER — METOCLOPRAMIDE 10 MG/1
10 TABLET ORAL EVERY 6 HOURS
Qty: 30 TABLET | Refills: 0 | Status: SHIPPED | OUTPATIENT
Start: 2025-01-07

## 2025-01-07 RX ORDER — DIPHENHYDRAMINE HYDROCHLORIDE 50 MG/ML
25 INJECTION INTRAMUSCULAR; INTRAVENOUS ONCE
Status: COMPLETED | OUTPATIENT
Start: 2025-01-07 | End: 2025-01-07

## 2025-01-07 RX ORDER — METOCLOPRAMIDE HYDROCHLORIDE 5 MG/ML
10 INJECTION INTRAMUSCULAR; INTRAVENOUS ONCE
Status: COMPLETED | OUTPATIENT
Start: 2025-01-07 | End: 2025-01-07

## 2025-01-07 RX ADMIN — SODIUM CHLORIDE 500 ML: 0.9 INJECTION, SOLUTION INTRAVENOUS at 21:34

## 2025-01-07 RX ADMIN — SODIUM CHLORIDE 1000 ML: 0.9 INJECTION, SOLUTION INTRAVENOUS at 19:46

## 2025-01-07 RX ADMIN — DIPHENHYDRAMINE HYDROCHLORIDE 25 MG: 50 INJECTION, SOLUTION INTRAMUSCULAR; INTRAVENOUS at 19:47

## 2025-01-07 RX ADMIN — METOCLOPRAMIDE 10 MG: 5 INJECTION, SOLUTION INTRAMUSCULAR; INTRAVENOUS at 19:49

## 2025-01-07 NOTE — TELEPHONE ENCOUNTER
Patient called in regards to vomiting and requested to speak with a nurse, warm transfer to nurse Bermeo.

## 2025-01-07 NOTE — TELEPHONE ENCOUNTER
"Spoke with Patient c.o nausea/vomiting since Sunday evening   States she is unable to keep fluids down  Advised she needs to go to ER per protocol disposition     She then states \"I drink and drink but then it makes me nauseous and I throw up, I think I just need Zofran\"    Does not want to go to ER wants Zofran sent to pharmacy     Reason for Disposition   Drinking very little and has signs of dehydration (e.g., no urine > 12 hours, very dry mouth, very lightheaded)    Answer Assessment - Initial Assessment Questions  1. VOMITING SEVERITY: \"How many times have you vomited in the past 24 hours?\"       5  2. ONSET: \"When did the vomiting begin?\"       Arjun evening   3. FLUIDS: \"What fluids or food have you vomited up today?\" \"Have you been able to keep any fluids down?\"      Not able to keep fluids down  4. ABDOMEN PAIN: \"Are your having any abdomen pain?\" If Yes : \"How bad is it and what does it feel like?\" (e.g., crampy, dull, intermittent, constant)       No- back pain   5. DIARRHEA: \"Is there any diarrhea?\" If Yes, ask: \"How many times today?\"       no  6. CONTACTS: \"Is there anyone else in the family with the same symptoms?\"       no  7. CAUSE: \"What do you think is causing your vomiting?\"      \"Maybe a cold\"  8. HYDRATION STATUS: \"Any signs of dehydration?\" (e.g., dry mouth [not only dry lips], too weak to stand) \"When did you last urinate?\"      Yes dy mouth cracked lips   9. OTHER SYMPTOMS: \"Do you have any other symptoms?\" (e.g., fever, headache, vertigo, vomiting blood or coffee grounds, recent head injury)      headache  10. PREGNANCY: \"Is there any chance you are pregnant?\" \"When was your last menstrual period?\"        Started Monday morning    Protocols used: Vomiting-Adult-[place]    "

## 2025-01-08 NOTE — ED PROVIDER NOTES
Time reflects when diagnosis was documented in both MDM as applicable and the Disposition within this note       Time User Action Codes Description Comment    1/7/2025  9:11 PM Shelton Roldan Add [R11.2] Nausea and vomiting           ED Disposition       ED Disposition   Discharge    Condition   Stable    Date/Time   Tue Jan 7, 2025  9:11 PM    Comment   Alejandro Jettelias discharge to home/self care.                   Assessment & Plan       Medical Decision Making  1. Nausea with vomiting - Denies abdominal pain, will check urine for infection and pregnancy, CBC for leukocytosis, metabolic panel for electrolyte abnormalities and dehydration,  LFT's to assess GB dysfunction, lipase for pancreatitis, will treat with IV fluids, antiemetics.     Problems Addressed:  Nausea and vomiting: acute illness or injury    Amount and/or Complexity of Data Reviewed  Labs: ordered.    Risk  Prescription drug management.        ED Course as of 01/09/25 0838   Tue Jan 07, 2025 2111 Patient feeling improved.        Medications   sodium chloride 0.9 % bolus 1,000 mL (0 mL Intravenous Stopped 1/7/25 2046)   metoclopramide (REGLAN) injection 10 mg (10 mg Intravenous Given 1/7/25 1949)   diphenhydrAMINE (BENADRYL) injection 25 mg (25 mg Intravenous Given 1/7/25 1947)   sodium chloride 0.9 % bolus 500 mL (0 mL Intravenous Stopped 1/7/25 2150)       ED Risk Strat Scores                          SBIRT 22yo+      Flowsheet Row Most Recent Value   Initial Alcohol Screen: US AUDIT-C     1. How often do you have a drink containing alcohol? 0 Filed at: 01/07/2025 2137   2. How many drinks containing alcohol do you have on a typical day you are drinking?  0 Filed at: 01/07/2025 2137   3a. Male UNDER 65: How often do you have five or more drinks on one occasion? 0 Filed at: 01/07/2025 2137   3b. FEMALE Any Age, or MALE 65+: How often do you have 4 or more drinks on one occassion? 0 Filed at: 01/07/2025 2137   Audit-C Score 0 Filed at: 01/07/2025     JIMMY: How many times in the past year have you...    Used an illegal drug or used a prescription medication for non-medical reasons? Never Filed at: 2025                            History of Present Illness       Chief Complaint   Patient presents with    Vomiting     States vomiting for about 2 days. States unable to keep anything down. Denies abd pain. Complaining of back pain       Past Medical History:   Diagnosis Date    Acid reflux     Disease of thyroid gland     GERD (gastroesophageal reflux disease)     Hashimoto's disease     Lactose intolerance     Neck pain 02/15/2023    Sleep difficulties     Varicella     Yeast dermatitis 2018      Past Surgical History:   Procedure Laterality Date    ADENOIDECTOMY       SECTION      MYRINGOTOMY      NOSE SURGERY      ND TONSILLECTOMY & ADENOIDECTOMY AGE 10/> Bilateral 2024    Procedure: TONSILLECTOMY & ADENOIDECTOMY;  Surgeon: Cortes Gonzalez MD;  Location: [facility];  Service: ENT    TONSILLECTOMY      UPPER GASTROINTESTINAL ENDOSCOPY        Family History   Problem Relation Age of Onset    Hashimoto's thyroiditis Mother     Chronic bronchitis Father     Asthma Father     Asthma Brother     Hashimoto's thyroiditis Maternal Aunt     Hashimoto's thyroiditis Maternal Grandmother     Heart disease Paternal Grandfather       Social History     Tobacco Use    Smoking status: Never    Smokeless tobacco: Never   Vaping Use    Vaping status: Never Used   Substance Use Topics    Alcohol use: No    Drug use: No      E-Cigarette/Vaping    E-Cigarette Use Never User       E-Cigarette/Vaping Substances    Nicotine No     THC No     CBD No     Flavoring No     Other No     Unknown No       I have reviewed and agree with the history as documented.     30 YO female presents with nausea and vomiting for the alst 2 days. States she has had difficulty tolerating PO, denies abdominal pain, she has had no diarrhea. She a history of ,  denies other abdominal surgeries. She denies sick contacts, notes may have eaten something that could have made her sick. Pt denies CP/SOB/F/C/D/C, no dysuria, burning on urination or blood in urine.       History provided by:  Patient   used: No        Review of Systems   Constitutional:  Negative for chills, fatigue and fever.   HENT:  Negative for dental problem.    Eyes:  Negative for visual disturbance.   Respiratory:  Negative for shortness of breath.    Cardiovascular:  Negative for chest pain.   Gastrointestinal:  Positive for nausea and vomiting. Negative for abdominal pain and diarrhea.   Genitourinary:  Negative for dysuria and frequency.   Musculoskeletal:  Negative for arthralgias.   Skin:  Negative for rash.   Neurological:  Negative for dizziness, weakness and light-headedness.   Psychiatric/Behavioral:  Negative for agitation, behavioral problems and confusion.    All other systems reviewed and are negative.          Objective       ED Triage Vitals [01/07/25 1901]   Temperature Pulse Blood Pressure Respirations SpO2 Patient Position - Orthostatic VS   98.2 °F (36.8 °C) 83 133/61 18 99 % Sitting      Temp Source Heart Rate Source BP Location FiO2 (%) Pain Score    Oral Monitor Right arm -- 6      Vitals      Date and Time Temp Pulse SpO2 Resp BP Pain Score FACES Pain Rating User   01/07/25 2158 -- 82 99 % 19 122/79 -- -- SS   01/07/25 1901 98.2 °F (36.8 °C) 83 99 % 18 133/61 6 -- RC            Physical Exam  Vitals and nursing note reviewed.   Constitutional:       Appearance: She is well-developed.   HENT:      Head: Normocephalic and atraumatic.   Eyes:      Extraocular Movements: Extraocular movements intact.   Cardiovascular:      Rate and Rhythm: Normal rate and regular rhythm.      Heart sounds: Normal heart sounds.   Pulmonary:      Effort: Pulmonary effort is normal.      Breath sounds: Normal breath sounds.   Abdominal:      Palpations: Abdomen is soft.   Musculoskeletal:          General: Normal range of motion.      Cervical back: Normal range of motion.   Skin:     General: Skin is warm and dry.   Neurological:      Mental Status: She is alert and oriented to person, place, and time.   Psychiatric:         Behavior: Behavior normal.         Thought Content: Thought content normal.         Results Reviewed       Procedure Component Value Units Date/Time    POCT pregnancy, urine [[id number]]  (Normal) Collected: 01/07/25 2102    Lab Status: Final result Updated: 01/07/25 2142     EXT Preg Test, Ur Negative     Control Valid    Urine Microscopic [[id number]]  (Abnormal) Collected: 01/07/25 2101    Lab Status: Final result Specimen: Urine, Clean Catch Updated: 01/07/25 2136     RBC, UA Innumerable /hpf      WBC, UA 30-50 /hpf      Epithelial Cells Moderate /hpf      Bacteria, UA Occasional /hpf     Urine culture [[id number]] Collected: 01/07/25 2101    Lab Status: In process Specimen: Urine, Clean Catch Updated: 01/07/25 2136    Urine Macroscopic, POC [[id number]]  (Abnormal) Collected: 01/07/25 2101    Lab Status: Final result Specimen: Urine Updated: 01/07/25 2102     Color, UA Bloody     Clarity, UA Cloudy     pH, UA 6.0     Leukocytes, UA Trace     Nitrite, UA Negative     Protein,  (2+) mg/dl      Glucose, UA Negative mg/dl      Ketones, UA 40 (2+) mg/dl      Urobilinogen, UA 0.2 E.U./dl      Bilirubin, UA Negative     Occult Blood, UA Large     Specific Gravity, UA 1.015    Narrative:      CLINITEK RESULT    Basic metabolic panel [[id number]] Collected: 01/07/25 1948    Lab Status: Final result Specimen: Blood from Arm, Left Updated: 01/07/25 2017     Sodium 138 mmol/L      Potassium 4.2 mmol/L      Chloride 103 mmol/L      CO2 28 mmol/L      ANION GAP 7 mmol/L      BUN 16 mg/dL      Creatinine 1.16 mg/dL      Glucose 102 mg/dL      Calcium 9.6 mg/dL      eGFR 62 ml/min/1.73sq m     Narrative:      National Kid[employer] guidelines for Chronic Kidney Disease (CKD):      Stage 1 with normal or high GFR (GFR > 90 mL/min/1.73 square meters)    Stage 2 Mild CKD (GFR = 60-89 mL/min/1.73 square meters)    Stage 3A Moderate CKD (GFR = 45-59 mL/min/1.73 square meters)    Stage 3B Moderate CKD (GFR = 30-44 mL/min/1.73 square meters)    Stage 4 Severe CKD (GFR = 15-29 mL/min/1.73 square meters)    Stage 5 End Stage CKD (GFR <15 mL/min/1.73 square meters)  Note: GFR calculation is accurate only with a steady state creatinine    Hepatic function panel [[id number]]  (Normal) Collected: 01/07/25 1948    Lab Status: Final result Specimen: Blood from Arm, Left Updated: 01/07/25 2017     Total Bilirubin 0.40 mg/dL      Bilirubin, Direct 0.06 mg/dL      Alkaline Phosphatase 49 U/L      AST 27 U/L      ALT 49 U/L      Total Protein 8.0 g/dL      Albumin 4.5 g/dL     Magnesium [[id number]]  (Normal) Collected: 01/07/25 1948    Lab Status: Final result Specimen: Blood from Arm, Left Updated: 01/07/25 2017     Magnesium 2.3 mg/dL     Lipase [[id number]]  (Normal) Collected: 01/07/25 1948    Lab Status: Final result Specimen: Blood from Arm, Left Updated: 01/07/25 2017     Lipase 13 u/L     CBC and differential [[id number]] Collected: 01/07/25 1948    Lab Status: Final result Specimen: Blood from Arm, Left Updated: 01/07/25 2001     WBC 8.45 Thousand/uL      RBC 4.64 Million/uL      Hemoglobin 12.8 g/dL      Hematocrit 39.5 %      MCV 85 fL      MCH 27.6 pg      MCHC 32.4 g/dL      RDW 12.6 %      MPV 9.4 fL      Platelets 235 Thousands/uL      nRBC 0 /100 WBCs      Segmented % 75 %      Immature Grans % 0 %      Lymphocytes % 20 %      Monocytes % 5 %      Eosinophils Relative 0 %      Basophils Relative 0 %      Absolute Neutrophils 6.29 Thousands/µL      Absolute Immature Grans 0.01 Thousand/uL      Absolute Lymphocytes 1.67 Thousands/µL      Absolute Monocytes 0.44 Thousand/µL      Eosinophils Absolute 0.03 Thousand/µL      Basophils Absolute 0.01 Thousands/µL             No orders to display        Procedures    ED Medication and Procedure Management   Prior to Admission Medications   Prescriptions Last Dose Informant Patient Reported? Taking?   levothyroxine 125 mcg tablet   No No   Sig: Take 1 tablet (125 mcg total) by mouth daily in the early morning   omeprazole (PriLOSEC) 40 MG capsule   No No   Sig: Take 1 capsule (40 mg total) by mouth 2 (two) times a day for 14 days      Facility-Administered Medications: None     Discharge Medication List as of 1/7/2025  9:12 PM        START taking these medications    Details   metoclopramide (REGLAN) 10 mg tablet Take 1 tablet (10 mg total) by mouth every 6 (six) hours, Starting Tue 1/7/2025, Normal           CONTINUE these medications which have NOT CHANGED    Details   levothyroxine 125 mcg tablet Take 1 tablet (125 mcg total) by mouth daily in the early morning, Starting Thu 10/17/2024, Normal      omeprazole (PriLOSEC) 40 MG capsule Take 1 capsule (40 mg total) by mouth 2 (two) times a day for 14 days, Starting Fri 11/29/2024, Until Fri 12/13/2024, Normal           No discharge procedures on file.  ED SEPSIS DOCUMENTATION   Time reflects when diagnosis was documented in both MDM as applicable and the Disposition within this note       Time User Action Codes Description Comment    1/7/2025  9:11 PM Shelton Roldan Add [R11.2] Nausea and vomiting                  Shelton Roldan MD  01/09/25 0880

## 2025-01-09 LAB
25(OH)D3+25(OH)D2 SERPL-MCNC: 31.9 NG/ML (ref 30–100)
BACTERIA UR CULT: NORMAL
T4 FREE SERPL-MCNC: 1.39 NG/DL (ref 0.82–1.77)
TSH SERPL DL<=0.005 MIU/L-ACNC: 0.24 UIU/ML (ref 0.45–4.5)

## 2025-01-10 ENCOUNTER — RESULTS FOLLOW-UP (OUTPATIENT)
Dept: FAMILY MEDICINE CLINIC | Facility: CLINIC | Age: 30
End: 2025-01-10

## 2025-01-10 ENCOUNTER — RESULTS FOLLOW-UP (OUTPATIENT)
Dept: ENDOCRINOLOGY | Facility: CLINIC | Age: 30
End: 2025-01-10

## 2025-01-10 DIAGNOSIS — R94.4 DECREASED GFR: Primary | ICD-10-CM

## 2025-01-10 LAB
ALMOND IGE QN: <0.1 KU/L
BRAZIL NUT IGE QN: <0.1 KU/L
CASHEW NUT IGE QN: <0.1 KU/L
CODFISH IGE QN: <0.1 KU/L
COW MILK IGE QN: <0.1 KU/L
EGG WHITE IGE QN: <0.1 KU/L
ENDOMYSIUM IGA SER QL: NEGATIVE
HAZELNUT IGE QN: <0.1 KU/L
IGA SERPL-MCNC: 220 MG/DL (ref 87–352)
Lab: NORMAL
PEANUT (RARA H) 6 IGE QN: <0.1 KU/L
SALMON IGE QN: <0.1 KU/L
SCALLOP IGE QN: <0.1 KU/L
SESAME SEED IGE QN: <0.1 KU/L
SHRIMP IGE QN: <0.1 KU/L
SOYBEAN IGE QN: <0.1 KU/L
TTG IGA SER-ACNC: <2 U/ML (ref 0–3)
TUNA IGE QN: <0.1 KU/L
WALNUT IGE QN: <0.1 KU/L
WHEAT (RTRI A) 19 IGE QN: <0.1 KU/L
WHEAT IGE QN: <0.1 KU/L

## 2025-01-10 NOTE — RESULT ENCOUNTER NOTE
TSH is very mildly low-for now take levothyroxine 1 tablet Monday through Saturday and half on Sunday-discuss further on upcoming visit

## 2025-01-13 ENCOUNTER — RESULTS FOLLOW-UP (OUTPATIENT)
Age: 30
End: 2025-01-13

## 2025-01-13 LAB — H PYLORI AG STL QL IA: NEGATIVE

## 2025-01-14 ENCOUNTER — RESULTS FOLLOW-UP (OUTPATIENT)
Dept: FAMILY MEDICINE CLINIC | Facility: CLINIC | Age: 30
End: 2025-01-14

## 2025-01-14 LAB
ALBUMIN/CREAT UR: 22 MG/G CREAT (ref 0–29)
CREAT UR-MCNC: 26.7 MG/DL
MICROALBUMIN UR-MCNC: 6 UG/ML

## 2025-02-12 ENCOUNTER — OFFICE VISIT (OUTPATIENT)
Dept: ENDOCRINOLOGY | Facility: CLINIC | Age: 30
End: 2025-02-12
Payer: COMMERCIAL

## 2025-02-12 VITALS
SYSTOLIC BLOOD PRESSURE: 118 MMHG | WEIGHT: 175.6 LBS | BODY MASS INDEX: 32.31 KG/M2 | HEIGHT: 62 IN | HEART RATE: 76 BPM | DIASTOLIC BLOOD PRESSURE: 84 MMHG

## 2025-02-12 DIAGNOSIS — E55.9 VITAMIN D DEFICIENCY: ICD-10-CM

## 2025-02-12 DIAGNOSIS — E66.09 CLASS 1 OBESITY DUE TO EXCESS CALORIES WITHOUT SERIOUS COMORBIDITY WITH BODY MASS INDEX (BMI) OF 32.0 TO 32.9 IN ADULT: ICD-10-CM

## 2025-02-12 DIAGNOSIS — E06.3 HYPOTHYROIDISM DUE TO HASHIMOTO'S THYROIDITIS: Primary | ICD-10-CM

## 2025-02-12 DIAGNOSIS — E66.811 CLASS 1 OBESITY DUE TO EXCESS CALORIES WITHOUT SERIOUS COMORBIDITY WITH BODY MASS INDEX (BMI) OF 32.0 TO 32.9 IN ADULT: ICD-10-CM

## 2025-02-12 PROCEDURE — 99214 OFFICE O/P EST MOD 30 MIN: CPT | Performed by: PHYSICIAN ASSISTANT

## 2025-02-12 RX ORDER — DEXAMETHASONE 1 MG
1 TABLET ORAL ONCE
Qty: 1 TABLET | Refills: 0 | Status: SHIPPED | OUTPATIENT
Start: 2025-02-12 | End: 2025-02-12

## 2025-02-12 NOTE — PROGRESS NOTES
"Established Patient Progress Note      Chief Complaint   Patient presents with    Hypothyroidism    Obesity        Impression & Plan:    Assessment & Plan  Hypothyroidism due to Hashimoto's thyroiditis  TSH: 0.237  on 1/8  Advised to decrease levothyroxine 125mcg to 1 tab x6 days and 1/2 tab one day weekly  Repeat TSH ordered - dose adjustment to follow if indicated  Reports difficulty swallowing. [facility]  Thyroid US ordered  Orders:    Dexamethasone; Future    Cortisol Level,7-9 AM Specimen; Future    dexamethasone (DECADRON) 1 mg tablet; Take 1 tablet (1 mg total) by mouth 1 (one) time for 1 dose Take at 11pm    TSH, 3rd generation with Free T4 reflex; Future    US thyroid; Future    Vitamin D deficiency  Last level 31.9  Has stopped supplementation due to normal levels- following with PCP       Class 1 obesity due to excess calories without serious comorbidity with body mass index (BMI) of 32.0 to 32.9 in adult  Reporting about a 20 pound weight gain over the past year with widening of her face and a hump on the back of her neck.  Questions if she has \"abnormal cortisol levels\"  Agreeable to dexamethasone suppression test.   Ordered 1mg dexamethasone and reviewed instructions with patient.  PCP intends to start on Wegovy but patient wanted to rule out underlying cause first.          History of Present Illness:   Alejandro Lawler is a 30 y.o. female with a history of hypothyroidism    Reports having a tonsillectomy performed in September, lost 17lbs after that due to limited oral intake. When she started eating normally, she gained the weight back in two weeks. She is concerned about the weight gain both then, and about a 20lb weight gain over the past year. She cooks at home mostly and follows a [employer] diet. She reports a hump/lump on back of her neck which has been present for 10 years, and has expanded recently. She reports a fullness in her face and chronic fatigue " "and muscle weakness. 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     Family doctor told patient she has some insulin resistance and would be willing to start patient on Wegovy. She wanted to meet with us to discuss the possibility of a \"cortisol problem\" before committing to Wegovy.    Patient has been taking levothyroxine first thing in the morning with water, separate from food and other medications by at least one hour, and from supplements by at least 4 hours. Denies, mood instability, changes in hair growth patterns. No significant change of voice. She sometimes feels a sensation of water getting stuck when swallowing. Agreeable to thyroid US at this time.     Reports ongoing symptoms of abdominal pain and nausea. Has history of H. Pylori and only takes PPI if she is eating something spicy or acidic. Discussed that taking daily allows medication to be effective and advised compliance.       Patient Active Problem List   Diagnosis    Hypothyroidism due to Hashimoto's thyroiditis    Bilateral carpal tunnel syndrome    Cubital tunnel syndrome on right    HPV in female    Cervical radiculopathy    Dysfunction of both eustachian tubes    ASNHL (asymmetrical sensorineural hearing loss)    DNS (deviated nasal septum)    AR (allergic rhinitis)    Class 1 obesity due to excess calories without serious comorbidity with body mass index (BMI) of 32.0 to 32.9 in adult    Encounter for screening mammogram for malignant neoplasm of breast    Heat rash    Snoring    S/P tonsillectomy    At risk for bleeding associated with tonsillectomy and adenoidectomy    Vitamin D deficiency    Low TSH level      Past Medical History:   Diagnosis Date    Acid reflux     Disease of thyroid gland 2012    GERD (gastroesophageal reflux disease)     Hashimoto's disease     Lactose intolerance     Neck pain 02/15/2023    Sleep difficulties     Varicella     Yeast dermatitis 08/16/2018      Past Surgical History:   Procedure " Laterality Date    ADENOIDECTOMY       SECTION      MYRINGOTOMY      NOSE SURGERY      [place] TONSILLECTOMY & ADENOIDECTOMY AGE 10/> Bilateral 2024    Procedure: TONSILLECTOMY & ADENOIDECTOMY;  Surgeon: Cortes Gonzalez MD;  Location[facility];  Service: ENT    TONSILLECTOMY      UPPER GASTROINTESTINAL ENDOSCOPY        Family History   Problem Relation Age of Onset    Hashimoto's thyroiditis Mother     Chronic bronchitis Father     Asthma Father     Asthma Brother     Hashimoto's thyroiditis Maternal Aunt     Hashimoto's thyroiditis Maternal Grandmother     Heart disease Paternal Grandfather      Social History     Tobacco Use    Smoking status: Never    Smokeless tobacco: Never   Substance Use Topics    Alcohol use: No     No Known Allergies      Current Outpatient Medications:     dexamethasone (DECADRON) 1 mg tablet, Take 1 tablet (1 mg total) by mouth 1 (one) time for 1 dose Take at 11pm, Disp: 1 tablet, Rfl: 0    levothyroxine 125 mcg tablet, Take 1 tablet (125 mcg total) by mouth daily in the early morning, Disp: 100 tablet, Rfl: 1    metoclopramide (REGLAN) 10 mg tablet, Take 1 tablet (10 mg total) by mouth every 6 (six) hours (Patient not taking: Reported on 2025), Disp: 30 tablet, Rfl: 0    omeprazole (PriLOSEC) 40 MG capsule, Take 1 capsule (40 mg total) by mouth 2 (two) times a day for 14 days, Disp: 28 capsule, Rfl: 0    Review of Systems   Constitutional:  Positive for fatigue and unexpected weight change.   HENT:  Positive for trouble swallowing. Negative for voice change.    Respiratory:  Negative for shortness of breath.    Cardiovascular:  Negative for chest pain and palpitations.   Gastrointestinal:  Positive for abdominal pain and nausea. Negative for diarrhea and vomiting.   Neurological:  Positive for weakness. Negative for light-headedness and headaches.   Psychiatric/Behavioral:  Positive for dysphoric mood.    All other systems reviewed and are negative.      Physical  "Exam:  Body mass index is 32.12 kg/m².  /84 (BP Location: Left arm, Patient Position: Sitting, Cuff Size: Adult)   Pulse 76   Ht 5' 2\" (1.575 m)   Wt 79.7 kg (175 lb 9.6 oz)   BMI 32.12 kg/m²    Wt Readings from Last 3 Encounters:   02/12/25 79.7 kg (175 lb 9.6 oz)   01/07/25 78.5 kg (173 lb 1 oz)   12/16/24 74.8 kg (165 lb)       Physical Exam  Constitutional:       General: She is not in acute distress.     Appearance: She is well-developed.   HENT:      Head: Normocephalic and atraumatic.   Eyes:      General: No scleral icterus.     Conjunctiva/sclera: Conjunctivae normal.   Neck:      Thyroid: No thyroid mass, thyromegaly or thyroid tenderness.   Pulmonary:      Effort: Pulmonary effort is normal. No respiratory distress.   Skin:     Findings: No rash.   Neurological:      Mental Status: She is alert.   Psychiatric:         Mood and Affect: Mood and affect normal.         Labs:   Lab Results   Component Value Date    HGBA1C 5.6 05/29/2024     Lab Results   Component Value Date    CREATININE 1.16 01/07/2025    CREATININE 0.60 09/15/2024    CREATININE 0.62 08/16/2024    BUN 16 01/07/2025    K 4.2 01/07/2025     01/07/2025    CO2 28 01/07/2025     EGFR   Date Value Ref Range Status   05/23/2023 177.16  Final     Comment:     Chronic Kidney Disease: eGFR <60 mL/min/1.73 sq.m.  Renal Failure: eGFR <15 mL/min/1.73 sq.m.     eGFR   Date Value Ref Range Status   01/07/2025 62 ml/min/1.73sq m Final     Lab Results   Component Value Date    HDL 49 (L) 08/09/2024    TRIG 141 08/09/2024     Lab Results   Component Value Date    ALT 49 01/07/2025    AST 27 01/07/2025    ALKPHOS 49 01/07/2025     Lab Results   Component Value Date    MFN9OFGVVJPN 5.928 (H) 08/27/2024    WTX3YCVXSJFN 0.496 08/09/2024     Lab Results   Component Value Date    FREET4 1.39 01/08/2025       Orders Placed This Encounter   Procedures    US thyroid     Standing Status:   Future     Expected Date:   2/12/2025     Expiration Date:   " 2/12/2029     Scheduling Instructions:      No prep required.        Please bring your insurance cards and a form of photo ID.  Bring your order/script for the test if from a non - [facility] provider.        Arrive 15 minutes prior to your appointment time to register.            To schedule this appointment, please contact Central Scheduling at [phone].           Does the patient have a history of thyroid cancer? If yes, order US HEAD NECK LYMPH NODE MAPPING [[id number]]:   No- patient does not have a history of thyroid cancer    Dexamethasone     Standing Status:   Future     Expiration Date:   4/12/2026    Cortisol Level,7-9 AM Specimen     This is a patient instruction: This test must be collected between 7-9 AM.      Standing Status:   Future     Number of Occurrences:   1     Expiration Date:   4/12/2026    TSH, 3rd generation with Free T4 reflex     Standing Status:   Future     Number of Occurrences:   1     Expiration Date:   4/12/2026       Patient Instructions   Ensure you are taking your thyroid medication at least one hour prior to meals, on an empty stomach and 4 hours before any vitamins/supplements  Obtain labs prior to follow-up appointment  Hold all biotin-containing supplements for three days prior to thyroid lab draws    For your dexamethosone suppression test:    Take the 1mg dexamethosone at 11pm the night before your labs  Get labs drawn first thing in the morning, at 8am.    Discussed with the patient and all questioned fully answered. She will call me if any problems arise.    Shanice Shetty PA-C

## 2025-02-12 NOTE — PATIENT INSTRUCTIONS
Ensure you are taking your thyroid medication at least one hour prior to meals, on an empty stomach and 4 hours before any vitamins/supplements  Obtain labs prior to follow-up appointment  Hold all biotin-containing supplements for three days prior to thyroid lab draws    For your dexamethosone suppression test:    Take the 1mg dexamethosone at 11pm the night before your labs  Get labs drawn first thing in the morning, at 8am.

## 2025-02-12 NOTE — ASSESSMENT & PLAN NOTE
TSH: 0.237  on 1/8  Advised to decrease levothyroxine 125mcg to 1 tab x6 days and 1/2 tab one day weekly  Repeat TSH ordered - dose adjustment to follow if indicated  Reports difficulty swallowing. Last Thyroid US at [facility] >2 years ago  Thyroid US ordered  Orders:    Dexamethasone; Future    Cortisol Level,7-9 AM Specimen; Future    dexamethasone (DECADRON) 1 mg tablet; Take 1 tablet (1 mg total) by mouth 1 (one) time for 1 dose Take at 11pm    TSH, 3rd generation with Free T4 reflex; Future    US thyroid; Future

## 2025-02-12 NOTE — ASSESSMENT & PLAN NOTE
"Reporting about a 20 pound weight gain over the past year with widening of her face and a hump on the back of her neck.  Questions if she has \"abnormal cortisol levels\"  Agreeable to dexamethasone suppression test.   Ordered 1mg dexamethasone and reviewed instructions with patient.  PCP intends to start on Wegovy but patient wanted to rule out underlying cause first.        "

## 2025-02-13 DIAGNOSIS — F32.89 OTHER DEPRESSION: Primary | ICD-10-CM

## 2025-02-14 ENCOUNTER — TELEPHONE (OUTPATIENT)
Age: 30
End: 2025-02-14

## 2025-02-17 ENCOUNTER — TELEMEDICINE (OUTPATIENT)
Dept: FAMILY MEDICINE CLINIC | Facility: CLINIC | Age: 30
End: 2025-02-17
Payer: COMMERCIAL

## 2025-02-17 DIAGNOSIS — F32.89 OTHER DEPRESSION: Primary | ICD-10-CM

## 2025-02-17 DIAGNOSIS — E06.3 HYPOTHYROIDISM DUE TO HASHIMOTO'S THYROIDITIS: ICD-10-CM

## 2025-02-17 PROBLEM — R06.83 SNORING: Status: RESOLVED | Noted: 2024-09-06 | Resolved: 2025-02-17

## 2025-02-17 PROBLEM — F32.A DEPRESSION: Status: ACTIVE | Noted: 2025-02-17

## 2025-02-17 PROBLEM — H90.3 ASNHL (ASYMMETRICAL SENSORINEURAL HEARING LOSS): Status: RESOLVED | Noted: 2024-06-04 | Resolved: 2025-02-17

## 2025-02-17 PROBLEM — L74.0 HEAT RASH: Status: RESOLVED | Noted: 2024-06-04 | Resolved: 2025-02-17

## 2025-02-17 PROCEDURE — 99214 OFFICE O/P EST MOD 30 MIN: CPT | Performed by: NURSE PRACTITIONER

## 2025-02-17 RX ORDER — DEXAMETHASONE 1 MG
1 TABLET ORAL
COMMUNITY
Start: 2025-02-12

## 2025-02-17 RX ORDER — ESCITALOPRAM OXALATE 5 MG/1
5 TABLET ORAL DAILY
Qty: 30 TABLET | Refills: 1 | Status: SHIPPED | OUTPATIENT
Start: 2025-02-17

## 2025-02-18 NOTE — PROGRESS NOTES
Virtual Regular VisitName: Alejandro Lawler      : 1995      MRN: [id number]  Encounter Provider: DALLAS Dyer  Encounter Date: 2025   Encounter department: [facility]  :  Assessment & Plan  Other depression  Patient will be trialed on Lexapro 5 mg daily for treatment of her depression symptoms.  I did reassure the patient that this medication is safe to take while breast-feeding.  I did advise her that it can take 2 to 4 weeks to see true effect from this medication.  I will have patient return to the office in 1 month to reassess her symptoms.    Orders:  •  escitalopram (LEXAPRO) 5 mg tablet; Take 1 tablet (5 mg total) by mouth daily    Hypothyroidism due to Hashimoto's thyroiditis  Patient continues to follow with endocrinology regarding this.           History of Present Illness     Depression: Patient reports that for the past month she has been experiencing increased depression symptoms.  She does report some anhedonia but denies any SI or increased fatigue.  The patient reports that she did have depression in the past and was prescribed Lexapro for treatment and this medication did improve her symptoms.  She is interested in restarting Lexapro at this time.  Of note, the patient is still breast-feeding.    Hypothyroidism: Patient continues to follow with endocrinology regarding this.      Review of Systems   Constitutional:  Negative for chills and fever.   HENT:  Negative for ear pain and sore throat.    Eyes:  Negative for pain and visual disturbance.   Respiratory:  Negative for cough, chest tightness, shortness of breath and wheezing.    Cardiovascular:  Negative for chest pain, palpitations and leg swelling.   Gastrointestinal:  Negative for abdominal pain, constipation, diarrhea, nausea and vomiting.   Endocrine: Negative for cold intolerance and heat intolerance.   Genitourinary:  Negative for decreased urine volume, dysuria and hematuria.    Musculoskeletal:  Negative for arthralgias, back pain and myalgias.   Skin:  Negative for color change and rash.   Allergic/Immunologic: Negative for environmental allergies.   Neurological:  Negative for dizziness, seizures, syncope, weakness, light-headedness, numbness and headaches.   Hematological:  Negative for adenopathy.   Psychiatric/Behavioral:  Positive for dysphoric mood. Negative for confusion, self-injury, sleep disturbance and suicidal ideas. The patient is not nervous/anxious.    All other systems reviewed and are negative.      Objective   There were no vitals taken for this visit.    Physical Exam  Vitals reviewed: limited due to video visit.   Constitutional:       General: She is not in acute distress.     Appearance: Normal appearance. She is not ill-appearing.   Neurological:      Mental Status: She is alert.         Administrative Statements   Encounter provider DALLAS Dyer    The Patient is located at Home and in the following state in which I hold an active license [place].    The patient was identified by name and date of birth. Alejandro Lawler was informed that this is a telemedicine visit and that the visit is being conducted through the Epic Embedded platform. She agrees to proceed..  My office door was closed. No one else was in the room.  She acknowledged consent and understanding of privacy and security of the video platform. The patient has agreed to participate and understands they can discontinue the visit at any time.    I have spent a total time of 15 minutes in caring for this patient on the day of the visit/encounter including Diagnostic results, Prognosis, Risks and benefits of tx options, Instructions for management, Patient and family education, Importance of tx compliance, Risk factor reductions, Impressions, Counseling / Coordination of care, Documenting in the medical record, Reviewing/placing orders in the medical record (including tests, medications, and/or  procedures), and Obtaining or reviewing history  .

## 2025-02-18 NOTE — ASSESSMENT & PLAN NOTE
Patient will be trialed on Lexapro 5 mg daily for treatment of her depression symptoms.  I did reassure the patient that this medication is safe to take while breast-feeding.  I did advise her that it can take 2 to 4 weeks to see true effect from this medication.  I will have patient return to the office in 1 month to reassess her symptoms.    Orders:  •  escitalopram (LEXAPRO) 5 mg tablet; Take 1 tablet (5 mg total) by mouth daily

## 2025-02-20 ENCOUNTER — APPOINTMENT (OUTPATIENT)
Dept: LAB | Facility: CLINIC | Age: 30
End: 2025-02-20
Payer: COMMERCIAL

## 2025-02-20 ENCOUNTER — TRANSCRIBE ORDERS (OUTPATIENT)
Dept: LAB | Facility: CLINIC | Age: 30
End: 2025-02-20

## 2025-02-20 DIAGNOSIS — E06.3 HYPOTHYROIDISM DUE TO HASHIMOTO'S THYROIDITIS: ICD-10-CM

## 2025-02-20 LAB
CORTIS AM PEAK SERPL-MCNC: 1 UG/DL (ref 6.7–22.6)
T4 FREE SERPL-MCNC: 0.91 NG/DL (ref 0.61–1.12)
TSH SERPL DL<=0.05 MIU/L-ACNC: 0.06 UIU/ML (ref 0.45–4.5)

## 2025-02-20 PROCEDURE — 84439 ASSAY OF FREE THYROXINE: CPT

## 2025-02-20 PROCEDURE — 36415 COLL VENOUS BLD VENIPUNCTURE: CPT

## 2025-02-20 PROCEDURE — 82533 TOTAL CORTISOL: CPT

## 2025-02-20 PROCEDURE — 80299 QUANTITATIVE ASSAY DRUG: CPT

## 2025-02-20 PROCEDURE — 84443 ASSAY THYROID STIM HORMONE: CPT

## 2025-02-25 DIAGNOSIS — E06.3 HYPOTHYROIDISM DUE TO HASHIMOTO'S THYROIDITIS: ICD-10-CM

## 2025-02-25 RX ORDER — LEVOTHYROXINE SODIUM 125 UG/1
TABLET ORAL
Start: 2025-02-25 | End: 2025-02-27

## 2025-02-26 DIAGNOSIS — E66.09 CLASS 1 OBESITY DUE TO EXCESS CALORIES WITHOUT SERIOUS COMORBIDITY WITH BODY MASS INDEX (BMI) OF 32.0 TO 32.9 IN ADULT: ICD-10-CM

## 2025-02-26 DIAGNOSIS — E66.09 CLASS 1 OBESITY DUE TO EXCESS CALORIES WITHOUT SERIOUS COMORBIDITY WITH BODY MASS INDEX (BMI) OF 32.0 TO 32.9 IN ADULT: Primary | ICD-10-CM

## 2025-02-26 DIAGNOSIS — E66.811 CLASS 1 OBESITY DUE TO EXCESS CALORIES WITHOUT SERIOUS COMORBIDITY WITH BODY MASS INDEX (BMI) OF 32.0 TO 32.9 IN ADULT: ICD-10-CM

## 2025-02-26 DIAGNOSIS — E66.811 CLASS 1 OBESITY DUE TO EXCESS CALORIES WITHOUT SERIOUS COMORBIDITY WITH BODY MASS INDEX (BMI) OF 32.0 TO 32.9 IN ADULT: Primary | ICD-10-CM

## 2025-02-27 ENCOUNTER — TELEPHONE (OUTPATIENT)
Age: 30
End: 2025-02-27

## 2025-02-27 DIAGNOSIS — E66.09 CLASS 1 OBESITY DUE TO EXCESS CALORIES WITHOUT SERIOUS COMORBIDITY WITH BODY MASS INDEX (BMI) OF 32.0 TO 32.9 IN ADULT: ICD-10-CM

## 2025-02-27 DIAGNOSIS — E06.3 HYPOTHYROIDISM DUE TO HASHIMOTO'S THYROIDITIS: Primary | ICD-10-CM

## 2025-02-27 DIAGNOSIS — E66.811 CLASS 1 OBESITY DUE TO EXCESS CALORIES WITHOUT SERIOUS COMORBIDITY WITH BODY MASS INDEX (BMI) OF 32.0 TO 32.9 IN ADULT: ICD-10-CM

## 2025-02-27 RX ORDER — PHENTERMINE HYDROCHLORIDE 15 MG/1
CAPSULE ORAL
Refills: 0 | OUTPATIENT
Start: 2025-02-27

## 2025-02-27 RX ORDER — LEVOTHYROXINE SODIUM 112 UG/1
112 TABLET ORAL DAILY
Qty: 30 TABLET | Refills: 1 | Status: SHIPPED | OUTPATIENT
Start: 2025-02-27

## 2025-02-28 ENCOUNTER — TELEPHONE (OUTPATIENT)
Age: 30
End: 2025-02-28

## 2025-02-28 RX ORDER — PHENTERMINE HYDROCHLORIDE 15 MG/1
CAPSULE ORAL
Refills: 0 | OUTPATIENT
Start: 2025-02-28

## 2025-02-28 NOTE — TELEPHONE ENCOUNTER
PA for Wegovy 0.25mg/0.5ml SUBMITTED to [employer]    via    []CMM-KEY:   [x]Surescripts-Case ID # [id number]   []Availity-Auth ID # NDC #   []Faxed to plan   []Other website   []Phone call Case ID #     []PA sent as URGENT    All office notes, labs and other pertaining documents and studies sent. Clinical questions answered. Awaiting determination from insurance company.     Turnaround time for your insurance to make a decision on your Prior Authorization can take 7-21 business days.

## 2025-03-03 ENCOUNTER — HOSPITAL ENCOUNTER (EMERGENCY)
Facility: HOSPITAL | Age: 30
Discharge: HOME | End: 2025-03-03
Attending: EMERGENCY MEDICINE
Payer: COMMERCIAL

## 2025-03-03 ENCOUNTER — OFFICE VISIT (OUTPATIENT)
Dept: FAMILY MEDICINE CLINIC | Facility: CLINIC | Age: 30
End: 2025-03-03
Payer: COMMERCIAL

## 2025-03-03 VITALS
OXYGEN SATURATION: 97 % | WEIGHT: 171.96 LBS | RESPIRATION RATE: 16 BRPM | HEART RATE: 87 BPM | BODY MASS INDEX: 31.45 KG/M2 | SYSTOLIC BLOOD PRESSURE: 134 MMHG | DIASTOLIC BLOOD PRESSURE: 66 MMHG | TEMPERATURE: 98.1 F

## 2025-03-03 VITALS
WEIGHT: 172.6 LBS | DIASTOLIC BLOOD PRESSURE: 82 MMHG | OXYGEN SATURATION: 98 % | TEMPERATURE: 98.9 F | BODY MASS INDEX: 31.76 KG/M2 | HEIGHT: 62 IN | HEART RATE: 99 BPM | SYSTOLIC BLOOD PRESSURE: 118 MMHG

## 2025-03-03 DIAGNOSIS — Z13.1 SCREENING FOR DIABETES MELLITUS: Primary | ICD-10-CM

## 2025-03-03 DIAGNOSIS — K52.9 GASTROENTERITIS: Primary | ICD-10-CM

## 2025-03-03 DIAGNOSIS — A08.4 VIRAL GASTROENTERITIS: ICD-10-CM

## 2025-03-03 DIAGNOSIS — E86.0 DEHYDRATION: Primary | ICD-10-CM

## 2025-03-03 DIAGNOSIS — Z13.220 SCREENING FOR LIPID DISORDERS: ICD-10-CM

## 2025-03-03 DIAGNOSIS — E86.0 DEHYDRATION: ICD-10-CM

## 2025-03-03 LAB
ALBUMIN SERPL BCG-MCNC: 4.6 G/DL (ref 3.5–5)
ALP SERPL-CCNC: 45 U/L (ref 34–104)
ALT SERPL W P-5'-P-CCNC: 64 U/L (ref 7–52)
ANION GAP SERPL CALCULATED.3IONS-SCNC: 8 MMOL/L (ref 4–13)
AST SERPL W P-5'-P-CCNC: 30 U/L (ref 13–39)
BACTERIA UR QL AUTO: ABNORMAL /HPF
BASOPHILS # BLD AUTO: 0 THOUSANDS/ÂΜL (ref 0–0.1)
BASOPHILS NFR BLD AUTO: 0 % (ref 0–1)
BILIRUB SERPL-MCNC: 0.43 MG/DL (ref 0.2–1)
BILIRUB UR QL STRIP: NEGATIVE
BUN SERPL-MCNC: 13 MG/DL (ref 5–25)
CALCIUM SERPL-MCNC: 9.5 MG/DL (ref 8.4–10.2)
CHLORIDE SERPL-SCNC: 100 MMOL/L (ref 96–108)
CLARITY UR: ABNORMAL
CO2 SERPL-SCNC: 27 MMOL/L (ref 21–32)
COLOR UR: YELLOW
CREAT SERPL-MCNC: 0.63 MG/DL (ref 0.6–1.3)
DEXAMETHASONE SERPL-MCNC: 230 NG/DL
EOSINOPHIL # BLD AUTO: 0.02 THOUSAND/ÂΜL (ref 0–0.61)
EOSINOPHIL NFR BLD AUTO: 0 % (ref 0–6)
ERYTHROCYTE [DISTWIDTH] IN BLOOD BY AUTOMATED COUNT: 13.2 % (ref 11.6–15.1)
EXT PREGNANCY TEST URINE: NEGATIVE
EXT. CONTROL: NORMAL
GFR SERPL CREATININE-BSD FRML MDRD: 119 ML/MIN/1.73SQ M
GLUCOSE SERPL-MCNC: 94 MG/DL (ref 65–140)
GLUCOSE UR STRIP-MCNC: NEGATIVE MG/DL
HCT VFR BLD AUTO: 37.9 % (ref 34.8–46.1)
HGB BLD-MCNC: 12.3 G/DL (ref 11.5–15.4)
HGB UR QL STRIP.AUTO: NEGATIVE
HYALINE CASTS #/AREA URNS LPF: ABNORMAL /LPF
IMM GRANULOCYTES # BLD AUTO: 0.02 THOUSAND/UL (ref 0–0.2)
IMM GRANULOCYTES NFR BLD AUTO: 0 % (ref 0–2)
KETONES UR STRIP-MCNC: ABNORMAL MG/DL
LEUKOCYTE ESTERASE UR QL STRIP: NEGATIVE
LIPASE SERPL-CCNC: 7 U/L (ref 11–82)
LYMPHOCYTES # BLD AUTO: 1.37 THOUSANDS/ÂΜL (ref 0.6–4.47)
LYMPHOCYTES NFR BLD AUTO: 19 % (ref 14–44)
MCH RBC QN AUTO: 28.2 PG (ref 26.8–34.3)
MCHC RBC AUTO-ENTMCNC: 32.5 G/DL (ref 31.4–37.4)
MCV RBC AUTO: 87 FL (ref 82–98)
MONOCYTES # BLD AUTO: 0.48 THOUSAND/ÂΜL (ref 0.17–1.22)
MONOCYTES NFR BLD AUTO: 7 % (ref 4–12)
MUCOUS THREADS UR QL AUTO: ABNORMAL
NEUTROPHILS # BLD AUTO: 5.47 THOUSANDS/ÂΜL (ref 1.85–7.62)
NEUTS SEG NFR BLD AUTO: 74 % (ref 43–75)
NITRITE UR QL STRIP: NEGATIVE
NON-SQ EPI CELLS URNS QL MICRO: ABNORMAL /HPF
NRBC BLD AUTO-RTO: 0 /100 WBCS
PH UR STRIP.AUTO: 5.5 [PH]
PLATELET # BLD AUTO: 242 THOUSANDS/UL (ref 149–390)
PMV BLD AUTO: 9.7 FL (ref 8.9–12.7)
POTASSIUM SERPL-SCNC: 3.6 MMOL/L (ref 3.5–5.3)
PROT SERPL-MCNC: 7.8 G/DL (ref 6.4–8.4)
PROT UR STRIP-MCNC: ABNORMAL MG/DL
RBC # BLD AUTO: 4.36 MILLION/UL (ref 3.81–5.12)
RBC #/AREA URNS AUTO: ABNORMAL /HPF
SODIUM SERPL-SCNC: 135 MMOL/L (ref 135–147)
SP GR UR STRIP.AUTO: 1.03 (ref 1–1.03)
TRANS CELLS #/AREA URNS HPF: PRESENT /[HPF]
UROBILINOGEN UR STRIP-ACNC: <2 MG/DL
WBC # BLD AUTO: 7.36 THOUSAND/UL (ref 4.31–10.16)
WBC #/AREA URNS AUTO: ABNORMAL /HPF

## 2025-03-03 PROCEDURE — 99215 OFFICE O/P EST HI 40 MIN: CPT | Performed by: NURSE PRACTITIONER

## 2025-03-03 PROCEDURE — 36415 COLL VENOUS BLD VENIPUNCTURE: CPT

## 2025-03-03 PROCEDURE — 99284 EMERGENCY DEPT VISIT MOD MDM: CPT

## 2025-03-03 PROCEDURE — 80053 COMPREHEN METABOLIC PANEL: CPT

## 2025-03-03 PROCEDURE — 96374 THER/PROPH/DIAG INJ IV PUSH: CPT

## 2025-03-03 PROCEDURE — 85025 COMPLETE CBC W/AUTO DIFF WBC: CPT

## 2025-03-03 PROCEDURE — 81001 URINALYSIS AUTO W/SCOPE: CPT

## 2025-03-03 PROCEDURE — 81025 URINE PREGNANCY TEST: CPT

## 2025-03-03 PROCEDURE — 83690 ASSAY OF LIPASE: CPT

## 2025-03-03 PROCEDURE — 93005 ELECTROCARDIOGRAM TRACING: CPT

## 2025-03-03 PROCEDURE — 96375 TX/PRO/DX INJ NEW DRUG ADDON: CPT

## 2025-03-03 PROCEDURE — 96361 HYDRATE IV INFUSION ADD-ON: CPT

## 2025-03-03 RX ORDER — KETOROLAC TROMETHAMINE 30 MG/ML
15 INJECTION, SOLUTION INTRAMUSCULAR; INTRAVENOUS ONCE
Status: COMPLETED | OUTPATIENT
Start: 2025-03-03 | End: 2025-03-03

## 2025-03-03 RX ORDER — METOCLOPRAMIDE HYDROCHLORIDE 5 MG/ML
10 INJECTION INTRAMUSCULAR; INTRAVENOUS ONCE
Status: COMPLETED | OUTPATIENT
Start: 2025-03-03 | End: 2025-03-03

## 2025-03-03 RX ORDER — ONDANSETRON 2 MG/ML
4 INJECTION INTRAMUSCULAR; INTRAVENOUS ONCE
Status: COMPLETED | OUTPATIENT
Start: 2025-03-03 | End: 2025-03-03

## 2025-03-03 RX ORDER — ONDANSETRON 4 MG/1
4 TABLET, ORALLY DISINTEGRATING ORAL EVERY 6 HOURS PRN
Qty: 12 TABLET | Refills: 0 | Status: SHIPPED | OUTPATIENT
Start: 2025-03-03

## 2025-03-03 RX ADMIN — METOCLOPRAMIDE 10 MG: 5 INJECTION, SOLUTION INTRAMUSCULAR; INTRAVENOUS at 20:27

## 2025-03-03 RX ADMIN — SODIUM CHLORIDE 1000 ML: 0.9 INJECTION, SOLUTION INTRAVENOUS at 20:32

## 2025-03-03 RX ADMIN — KETOROLAC TROMETHAMINE 15 MG: 30 INJECTION, SOLUTION INTRAMUSCULAR; INTRAVENOUS at 20:33

## 2025-03-03 RX ADMIN — ONDANSETRON 4 MG: 2 INJECTION INTRAMUSCULAR; INTRAVENOUS at 21:27

## 2025-03-03 NOTE — ASSESSMENT & PLAN NOTE
ADT transfer order was placed for [place] [facility] so that patient can be further evaluated for acute dehydration.  I did advise her that I feel it is likely she is suffering from an acute viral gastroenteritis.  Due to her decreased oral intake and recent syncopal episode I feel that IV fluids are warranted at this point for rehydration.  The patient will transport herself to the ED.  Orders:  •  Transfer to other facility

## 2025-03-03 NOTE — PROGRESS NOTES
Name: Alejandro Lawler      : 1995      MRN: [id number]  Encounter Provider: DALLAS Dyer  Encounter Date: 3/3/2025   Encounter department: [facility]  :  Assessment & Plan  Dehydration  ADT transfer order was placed for [place] [facility] so that patient can be further evaluated for acute dehydration.  I did advise her that I feel it is likely she is suffering from an acute viral gastroenteritis.  Due to her decreased oral intake and recent syncopal episode I feel that IV fluids are warranted at this point for rehydration.  The patient will transport herself to the ED.  Orders:  •  Transfer to other facility    Viral gastroenteritis  ADT transfer order was placed for [place] [facility] so that patient can be further evaluated for acute dehydration.  I did advise her that I feel it is likely she is suffering from an acute viral gastroenteritis.  Due to her decreased oral intake and recent syncopal episode I feel that IV fluids are warranted at this point for rehydration.  The patient will transport herself to the ED.  Orders:  •  Transfer to other facility           History of Present Illness   GI symptoms: Patient reports over the past 24 hours she has been experiencing recurrent nausea, vomiting, and diarrhea.  She reports that she has experienced multiple episodes of diarrhea and vomiting over the past 24 hours.  She reports that has been very difficult for her to eat or drink.  She does report that so far every time she has not drank anything today she has vomited.  She has also not eaten anything so far today.  She does also report that she had a syncopal episode last night.  She reports that she did experience the episode while in bed and she is unsure how long she was unconscious for.  She has not had any further syncopal episodes so far today.  Of note, the patient was recently prescribed Wegovy but she did not begin using the medication yet.  She was also started on Lexapro a  "few days ago.        Review of Systems   Constitutional:  Negative for chills and fever.   HENT:  Negative for ear pain and sore throat.    Eyes:  Negative for pain and visual disturbance.   Respiratory:  Negative for cough, chest tightness, shortness of breath and wheezing.    Cardiovascular:  Negative for chest pain, palpitations and leg swelling.   Gastrointestinal:  Positive for diarrhea, nausea and vomiting. Negative for abdominal distention, abdominal pain, anal bleeding, blood in stool, constipation and rectal pain.   Endocrine: Negative for cold intolerance and heat intolerance.   Genitourinary:  Negative for decreased urine volume, dysuria and hematuria.   Musculoskeletal:  Negative for arthralgias, back pain and myalgias.   Skin:  Negative for color change and rash.   Allergic/Immunologic: Negative for environmental allergies.   Neurological:  Positive for syncope (1 episode) and weakness. Negative for dizziness, tremors, seizures, facial asymmetry, speech difficulty, light-headedness, numbness and headaches.   Hematological:  Negative for adenopathy.   Psychiatric/Behavioral:  Negative for confusion. The patient is not nervous/anxious.    All other systems reviewed and are negative.      Objective   /82 (BP Location: Right arm, Patient Position: Sitting, Cuff Size: Adult)   Pulse 99   Temp 98.9 °F (37.2 °C) (Tympanic)   Ht 5' 2\" (1.575 m)   Wt 78.3 kg (172 lb 9.6 oz)   SpO2 98%   BMI 31.57 kg/m²      Physical Exam  Vitals and nursing note reviewed.   Constitutional:       General: She is not in acute distress.     Appearance: Normal appearance. She is well-developed. She is not ill-appearing.   HENT:      Head: Normocephalic.   Eyes:      Conjunctiva/sclera: Conjunctivae normal.   Cardiovascular:      Rate and Rhythm: Normal rate and regular rhythm.      Pulses: Normal pulses.           Carotid pulses are 2+ on the right side and 2+ on the left side.       Radial pulses are 2+ on the right " side and 2+ on the left side.        Posterior tibial pulses are 2+ on the right side and 2+ on the left side.      Heart sounds: Normal heart sounds. No murmur heard.  Pulmonary:      Effort: Pulmonary effort is normal. No respiratory distress.      Breath sounds: Normal breath sounds. No decreased breath sounds, wheezing, rhonchi or rales.   Abdominal:      General: Abdomen is flat. Bowel sounds are normal. There is no distension.      Palpations: Abdomen is soft.      Tenderness: There is abdominal tenderness in the right lower quadrant, left upper quadrant and left lower quadrant. There is no right CVA tenderness, left CVA tenderness or guarding.   Musculoskeletal:         General: No swelling. Normal range of motion.      Cervical back: Normal range of motion and neck supple.      Right lower leg: No edema.      Left lower leg: No edema.   Skin:     General: Skin is warm and dry.      Capillary Refill: Capillary refill takes less than 2 seconds.   Neurological:      General: No focal deficit present.      Mental Status: She is alert and oriented to person, place, and time.   Psychiatric:         Mood and Affect: Mood normal.         Behavior: Behavior normal.         Thought Content: Thought content normal.         Judgment: Judgment normal.

## 2025-03-04 LAB
ATRIAL RATE: 89 BPM
P AXIS: 56 DEGREES
PR INTERVAL: 152 MS
QRS AXIS: 48 DEGREES
QRSD INTERVAL: 86 MS
QT INTERVAL: 364 MS
QTC INTERVAL: 442 MS
T WAVE AXIS: 33 DEGREES
VENTRICULAR RATE: 89 BPM

## 2025-03-04 PROCEDURE — 93010 ELECTROCARDIOGRAM REPORT: CPT | Performed by: STUDENT IN AN ORGANIZED HEALTH CARE EDUCATION/TRAINING PROGRAM

## 2025-03-04 NOTE — DISCHARGE INSTRUCTIONS
-Your symptoms are most likely due to a virus and should resolve spontaneously over the next week.  -Please stay well-hydrated and reintroduce foods as tolerated, such as BRAT diet (bananas, rice, applesauce, toast).  -Can take Zofran every 6-8 hours as needed for nausea.  Can also try over-the-counter medications like Pepcid or Maalox for additional benefit.  If having pain you can take ibuprofen or Tylenol as needed.  -Return to the emergency department if symptoms worsen.  -Follow up with your PCP for management if symptoms persist.

## 2025-03-04 NOTE — ED PROVIDER NOTES
Time reflects when diagnosis was documented in both MDM as applicable and the Disposition within this note       Time User Action Codes Description Comment    3/3/2025 10:15 PM Sohail Nayak Add [K52.9] Gastroenteritis     3/3/2025 10:15 PM Shoail Nayak Add [E86.0] Dehydration           ED Disposition       ED Disposition   Discharge    Condition   Stable    Date/Time   Mon Mar 3, 2025 10:15 PM    Comment   Alejandro Lawler discharge to home/self care.                   Assessment & Plan       Medical Decision Making  30-year-old female presents with nausea, vomiting, diarrhea since last night.  On exam she is somewhat ill-appearing, AOx3, borderline tachycardic.  Mucous membranes somewhat dry.    We give IV fluids for likely mild dehydration, Toradol and Reglan for headache/nausea.  Check CBC, CMP, lipase, UA and pregnancy.    Patient had minimal improvement with Reglan but much better improvement following Zofran.  Overall she is feeling much better following medications and fluids.  Blood work unremarkable, UA with 3+ ketonuria likely from frequent vomiting and diarrhea.  Was adequately hydrated with IV fluids.  Overall presentation is consistent with acute gastroenteritis and patient stable for discharge home.  Sent Zofran to pharmacy for supportive care.  Recommend follow-up with PCP as needed, return to ED if acutely worsening.  Patient expresses understanding of the condition, treatment plan, follow-up instructions, and return precautions.      Amount and/or Complexity of Data Reviewed  Labs: ordered. Decision-making details documented in ED Course.    Risk  Prescription drug management.        ED Course as of 03/04/25 0147   Mon Mar 03, 2025   2048 Ketones, UA(!): 80 (3+)  Secondary to vomiting and diarrhea.       Medications   sodium chloride 0.9 % bolus 1,000 mL (0 mL Intravenous Stopped 3/3/25 2220)   ketorolac (TORADOL) injection 15 mg (15 mg Intravenous Given 3/3/25 2033)   metoclopramide (REGLAN)  "injection 10 mg (10 mg Intravenous Given 3/3/25 2027)   ondansetron (ZOFRAN) injection 4 mg (4 mg Intravenous Given 3/3/25 2127)       ED Risk Strat Scores                            SBIRT 20yo+      Flowsheet Row Most Recent Value   Initial Alcohol Screen: US AUDIT-C     1. How often do you have a drink containing alcohol? 0 Filed at: 2025   2. How many drinks containing alcohol do you have on a typical day you are drinking?  0 Filed at: 2025   3b. FEMALE Any Age, or MALE 65+: How often do you have 4 or more drinks on one occassion? 0 Filed at: 2025   Audit-C Score 0 Filed at: 2025   JIMMY: How many times in the past year have you...    Used an illegal drug or used a prescription medication for non-medical reasons? Never Filed at: 2025                            History of Present Illness       Chief Complaint   Patient presents with    Vomiting     Patient states she is having vomiting and diarrhea since last night.    Syncope     Patient states she had 2 episodes of \"fainting\" while vomiting last night. Denies head strike.       Past Medical History:   Diagnosis Date    Acid reflux     ASNHL (asymmetrical sensorineural hearing loss) 2024    Disease of thyroid gland     GERD (gastroesophageal reflux disease)     Hashimoto's disease     Heat rash 2024    Lactose intolerance     Neck pain 02/15/2023    Sleep difficulties     Snoring 2024    Varicella     Yeast dermatitis 2018      Past Surgical History:   Procedure Laterality Date    ADENOIDECTOMY       SECTION      MYRINGOTOMY      NOSE SURGERY      MI TONSILLECTOMY & ADENOIDECTOMY AGE 10/> Bilateral 2024    Procedure: TONSILLECTOMY & ADENOIDECTOMY;  Surgeon: Cortes Gonzalez MD;  Location: [facility];  Service: ENT    TONSILLECTOMY      UPPER GASTROINTESTINAL ENDOSCOPY        Family History   Problem Relation Age of Onset    Hashimoto's thyroiditis Mother     Chronic " bronchitis Father     Asthma Father     Asthma Brother     Hashimoto's thyroiditis Maternal Aunt     Hashimoto's thyroiditis Maternal Grandmother     Heart disease Paternal Grandfather       Social History     Tobacco Use    Smoking status: Never    Smokeless tobacco: Never   Vaping Use    Vaping status: Never Used   Substance Use Topics    Alcohol use: No    Drug use: No      E-Cigarette/Vaping    E-Cigarette Use Never User       E-Cigarette/Vaping Substances    Nicotine No     THC No     CBD No     Flavoring No     Other No     Unknown No       I have reviewed and agree with the history as documented.     30-year-old female presents for evaluation of flulike symptoms since last night.  Primarily complaining of nausea, vomiting, and diarrhea very frequently.  She also had 2 syncopal episodes at home that occurred while vomiting.  No fall or head strike at that time.  She is only tolerating very small amount of fluids and not tolerating food.  No medications taken PTA, partially due to the nausea.  Was seen in an outpatient office and told to go to the ER for dehydration.        Review of Systems   Constitutional:  Positive for fatigue. Negative for chills and fever.   HENT:  Negative for ear pain and sore throat.    Eyes:  Negative for pain and visual disturbance.   Respiratory:  Negative for cough and shortness of breath.    Cardiovascular:  Negative for chest pain and palpitations.   Gastrointestinal:  Positive for diarrhea, nausea and vomiting. Negative for abdominal pain.   Genitourinary:  Negative for dysuria and hematuria.   Musculoskeletal:  Negative for arthralgias and back pain.   Skin:  Negative for color change and rash.   Neurological:  Positive for headaches. Negative for seizures and syncope.   All other systems reviewed and are negative.          Objective       ED Triage Vitals   Temperature Pulse Blood Pressure Respirations SpO2 Patient Position - Orthostatic VS   03/03/25 1935 03/03/25 1935  03/03/25 1935 03/03/25 1935 03/03/25 1935 03/03/25 1935   98.1 °F (36.7 °C) 101 110/65 16 98 % Sitting      Temp Source Heart Rate Source BP Location FiO2 (%) Pain Score    03/03/25 1935 03/03/25 1935 03/03/25 1935 -- 03/03/25 2033    Oral Monitor Right arm  6      Vitals      Date and Time Temp Pulse SpO2 Resp BP Pain Score FACES Pain Rating User   03/03/25 2200 -- 87 97 % 16 134/66 4 -- SL   03/03/25 2100 -- 84 98 % 17 109/71 -- -- DH   03/03/25 2033 -- -- -- -- -- 6 -- SL   03/03/25 1935 98.1 °F (36.7 °C) 101 98 % 16 110/65 -- -- [facility]            Physical Exam  Vitals and nursing note reviewed.   Constitutional:       Appearance: She is well-developed.   HENT:      Head: Normocephalic and atraumatic.      Mouth/Throat:      Mouth: Mucous membranes are dry.   Eyes:      Conjunctiva/sclera: Conjunctivae normal.   Cardiovascular:      Rate and Rhythm: Normal rate and regular rhythm.      Heart sounds: No murmur heard.  Pulmonary:      Effort: Pulmonary effort is normal. No respiratory distress.      Breath sounds: Normal breath sounds.   Abdominal:      Palpations: Abdomen is soft.      Tenderness: There is no abdominal tenderness.   Musculoskeletal:         General: No swelling.      Cervical back: Neck supple.   Skin:     General: Skin is warm and dry.      Capillary Refill: Capillary refill takes less than 2 seconds.   Neurological:      Mental Status: She is alert.   Psychiatric:         Mood and Affect: Mood normal.         Results Reviewed       Procedure Component Value Units Date/Time    Comprehensive metabolic panel [[id number]]  (Abnormal) Collected: 03/03/25 2026    Lab Status: Final result Specimen: Blood from Arm, Left Updated: 03/03/25 2055     Sodium 135 mmol/L      Potassium 3.6 mmol/L      Chloride 100 mmol/L      CO2 27 mmol/L      ANION GAP 8 mmol/L      BUN 13 mg/dL      Creatinine 0.63 mg/dL      Glucose 94 mg/dL      Calcium 9.5 mg/dL      AST 30 U/L      ALT 64 U/L      Alkaline Phosphatase 45  U/L      Total Protein 7.8 g/dL      Albumin 4.6 g/dL      Total Bilirubin 0.43 mg/dL      eGFR 119 ml/min/1.73sq m     Narrative:      National Kidney Disease Foundation guidelines for Chronic Kidney Disease (CKD):     Stage 1 with normal or high GFR (GFR > 90 mL/min/1.73 square meters)    Stage 2 Mild CKD (GFR = 60-89 mL/min/1.73 square meters)    Stage 3A Moderate CKD (GFR = 45-59 mL/min/1.73 square meters)    Stage 3B Moderate CKD (GFR = 30-44 mL/min/1.73 square meters)    Stage 4 Severe CKD (GFR = 15-29 mL/min/1.73 square meters)    Stage 5 End Stage CKD (GFR <15 mL/min/1.73 square meters)  Note: GFR calculation is accurate only with a steady state creatinine    Lipase [[id number]]  (Abnormal) Collected: 03/03/25 2026    Lab Status: Final result Specimen: Blood from Arm, Left Updated: 03/03/25 2055     Lipase 7 u/L     Urinalysis with microscopic [[id number]]  (Abnormal) Collected: 03/03/25 2026    Lab Status: Final result Specimen: Urine, Clean Catch Updated: 03/03/25 2048     Color, UA Yellow     Clarity, UA Turbid     Specific Gravity, UA 1.033     pH, UA 5.5     Leukocytes, UA Negative     Nitrite, UA Negative     Protein, UA 30 (1+) mg/dl      Glucose, UA Negative mg/dl      Ketones, UA 80 (3+) mg/dl      Urobilinogen, UA <2.0 mg/dl      Bilirubin, UA Negative     Occult Blood, UA Negative     RBC, UA 4-10 /hpf      WBC, UA 4-10 /hpf      Epithelial Cells Innumerable /hpf      Bacteria, UA Occasional /hpf      MUCUS THREADS Occasional     Hyaline Casts, UA 0-3 /lpf      Transitional Epithelial Cells Present    CBC and differential [[id number]] Collected: 03/03/25 2026    Lab Status: Final result Specimen: Blood from Arm, Left Updated: 03/03/25 2040     WBC 7.36 Thousand/uL      RBC 4.36 Million/uL      Hemoglobin 12.3 g/dL      Hematocrit 37.9 %      MCV 87 fL      MCH 28.2 pg      MCHC 32.5 g/dL      RDW 13.2 %      MPV 9.7 fL      Platelets 242 Thousands/uL      nRBC 0 /100 WBCs      Segmented % 74 %       Immature Grans % 0 %      Lymphocytes % 19 %      Monocytes % 7 %      Eosinophils Relative 0 %      Basophils Relative 0 %      Absolute Neutrophils 5.47 Thousands/µL      Absolute Immature Grans 0.02 Thousand/uL      Absolute Lymphocytes 1.37 Thousands/µL      Absolute Monocytes 0.48 Thousand/µL      Eosinophils Absolute 0.02 Thousand/µL      Basophils Absolute 0.00 Thousands/µL     POCT pregnancy, urine [[id number]]  (Normal) Collected: 03/03/25 2028    Lab Status: Final result Updated: 03/03/25 2028     EXT Preg Test, Ur Negative     Control Valid            No orders to display       Procedures    ED Medication and Procedure Management   Prior to Admission Medications   Prescriptions Last Dose Informant Patient Reported? Taking?   Semaglutide-Weight Management (WEGOVY) 0.25 MG/0.5ML   No No   Sig: Inject 0.5 mL (0.25 mg total) under the skin once a week   dexamethasone (DECADRON) 1 mg tablet   Yes No   Sig: Take 1 mg by mouth daily with breakfast   escitalopram (LEXAPRO) 5 mg tablet   No No   Sig: Take 1 tablet (5 mg total) by mouth daily   levothyroxine (Synthroid) 112 mcg tablet   No No   Sig: Take 1 tablet (112 mcg total) by mouth daily   omeprazole (PriLOSEC) 40 MG capsule   No No   Sig: Take 1 capsule (40 mg total) by mouth 2 (two) times a day for 14 days      Facility-Administered Medications: None     Discharge Medication List as of 3/3/2025 10:18 PM        START taking these medications    Details   ondansetron (ZOFRAN-ODT) 4 mg disintegrating tablet Take 1 tablet (4 mg total) by mouth every 6 (six) hours as needed for nausea or vomiting, Starting Mon 3/3/2025, Normal           CONTINUE these medications which have NOT CHANGED    Details   dexamethasone (DECADRON) 1 mg tablet Take 1 mg by mouth daily with breakfast, Starting Wed 2/12/2025, Historical Med      escitalopram (LEXAPRO) 5 mg tablet Take 1 tablet (5 mg total) by mouth daily, Starting Mon 2/17/2025, Normal      levothyroxine (Synthroid) 112 mcg  tablet Take 1 tablet (112 mcg total) by mouth daily, Starting Thu 2/27/2025, Normal      omeprazole (PriLOSEC) 40 MG capsule Take 1 capsule (40 mg total) by mouth 2 (two) times a day for 14 days, Starting Fri 11/29/2024, Until Fri 12/13/2024, Normal      Semaglutide-Weight Management (WEGOVY) 0.25 MG/0.5ML Inject 0.5 mL (0.25 mg total) under the skin once a week, Starting Wed 2/26/2025, Until Fri 3/28/2025, Normal           No discharge procedures on file.  ED SEPSIS DOCUMENTATION   Time reflects when diagnosis was documented in both MDM as applicable and the Disposition within this note       Time User Action Codes Description Comment    3/3/2025 10:15 PM Sohail Nayak [K52.9] Gastroenteritis     3/3/2025 10:15 PM Sohail Nayak [E86.0] Izzy Nayak PA-C  03/04/25 0148

## 2025-03-20 DIAGNOSIS — F32.89 OTHER DEPRESSION: ICD-10-CM

## 2025-03-21 RX ORDER — ESCITALOPRAM OXALATE 5 MG/1
5 TABLET ORAL DAILY
Qty: 90 TABLET | Refills: 1 | Status: SHIPPED | OUTPATIENT
Start: 2025-03-21

## 2025-03-23 DIAGNOSIS — E06.3 HYPOTHYROIDISM DUE TO HASHIMOTO'S THYROIDITIS: ICD-10-CM

## 2025-03-24 RX ORDER — LEVOTHYROXINE SODIUM 112 UG/1
112 TABLET ORAL DAILY
Qty: 90 TABLET | Refills: 1 | Status: SHIPPED | OUTPATIENT
Start: 2025-03-24

## 2025-04-01 DIAGNOSIS — E66.811 CLASS 1 OBESITY DUE TO EXCESS CALORIES WITHOUT SERIOUS COMORBIDITY WITH BODY MASS INDEX (BMI) OF 32.0 TO 32.9 IN ADULT: Primary | ICD-10-CM

## 2025-04-01 DIAGNOSIS — E66.09 CLASS 1 OBESITY DUE TO EXCESS CALORIES WITHOUT SERIOUS COMORBIDITY WITH BODY MASS INDEX (BMI) OF 32.0 TO 32.9 IN ADULT: Primary | ICD-10-CM

## 2025-04-01 RX ORDER — SEMAGLUTIDE 0.5 MG/.5ML
INJECTION, SOLUTION SUBCUTANEOUS
Qty: 2 ML | Refills: 0 | Status: SHIPPED | OUTPATIENT
Start: 2025-04-01

## 2025-04-02 PROBLEM — A08.4 VIRAL GASTROENTERITIS: Status: RESOLVED | Noted: 2025-03-03 | Resolved: 2025-04-02

## 2025-04-29 DIAGNOSIS — E66.811 CLASS 1 OBESITY DUE TO EXCESS CALORIES WITHOUT SERIOUS COMORBIDITY WITH BODY MASS INDEX (BMI) OF 32.0 TO 32.9 IN ADULT: Primary | ICD-10-CM

## 2025-04-29 DIAGNOSIS — E06.3 HYPOTHYROIDISM DUE TO HASHIMOTO'S THYROIDITIS: ICD-10-CM

## 2025-04-29 DIAGNOSIS — E66.09 CLASS 1 OBESITY DUE TO EXCESS CALORIES WITHOUT SERIOUS COMORBIDITY WITH BODY MASS INDEX (BMI) OF 32.0 TO 32.9 IN ADULT: Primary | ICD-10-CM

## 2025-04-29 RX ORDER — SEMAGLUTIDE 1 MG/.5ML
INJECTION, SOLUTION SUBCUTANEOUS
Qty: 2 ML | Refills: 0 | Status: SHIPPED | OUTPATIENT
Start: 2025-04-29

## 2025-04-30 RX ORDER — LEVOTHYROXINE SODIUM 112 UG/1
112 TABLET ORAL DAILY
Qty: 90 TABLET | Refills: 1 | Status: SHIPPED | OUTPATIENT
Start: 2025-04-30

## 2025-05-24 DIAGNOSIS — E06.3 HYPOTHYROIDISM DUE TO HASHIMOTO'S THYROIDITIS: ICD-10-CM

## 2025-05-27 RX ORDER — LEVOTHYROXINE SODIUM 112 MCG
112 TABLET ORAL DAILY
Qty: 90 TABLET | Refills: 2 | OUTPATIENT
Start: 2025-05-27

## 2025-05-28 DIAGNOSIS — E06.3 HYPOTHYROIDISM DUE TO HASHIMOTO'S THYROIDITIS: Primary | ICD-10-CM

## 2025-05-29 DIAGNOSIS — E66.09 CLASS 1 OBESITY DUE TO EXCESS CALORIES WITHOUT SERIOUS COMORBIDITY WITH BODY MASS INDEX (BMI) OF 32.0 TO 32.9 IN ADULT: ICD-10-CM

## 2025-05-29 DIAGNOSIS — E66.811 CLASS 1 OBESITY DUE TO EXCESS CALORIES WITHOUT SERIOUS COMORBIDITY WITH BODY MASS INDEX (BMI) OF 32.0 TO 32.9 IN ADULT: ICD-10-CM

## 2025-05-29 RX ORDER — SEMAGLUTIDE 1 MG/.5ML
INJECTION, SOLUTION SUBCUTANEOUS
Qty: 2 ML | Refills: 0 | Status: SHIPPED | OUTPATIENT
Start: 2025-05-29

## 2025-06-30 DIAGNOSIS — E66.09 CLASS 1 OBESITY DUE TO EXCESS CALORIES WITHOUT SERIOUS COMORBIDITY WITH BODY MASS INDEX (BMI) OF 32.0 TO 32.9 IN ADULT: ICD-10-CM

## 2025-06-30 DIAGNOSIS — E66.811 CLASS 1 OBESITY DUE TO EXCESS CALORIES WITHOUT SERIOUS COMORBIDITY WITH BODY MASS INDEX (BMI) OF 32.0 TO 32.9 IN ADULT: ICD-10-CM

## 2025-07-02 DIAGNOSIS — E66.09 CLASS 1 OBESITY DUE TO EXCESS CALORIES WITHOUT SERIOUS COMORBIDITY WITH BODY MASS INDEX (BMI) OF 32.0 TO 32.9 IN ADULT: Primary | ICD-10-CM

## 2025-07-02 DIAGNOSIS — E66.811 CLASS 1 OBESITY DUE TO EXCESS CALORIES WITHOUT SERIOUS COMORBIDITY WITH BODY MASS INDEX (BMI) OF 32.0 TO 32.9 IN ADULT: Primary | ICD-10-CM

## 2025-07-02 RX ORDER — SEMAGLUTIDE 1.7 MG/.75ML
INJECTION, SOLUTION SUBCUTANEOUS
Qty: 3 ML | Refills: 0 | Status: SHIPPED | OUTPATIENT
Start: 2025-07-02

## 2025-07-02 RX ORDER — SEMAGLUTIDE 1 MG/.5ML
INJECTION, SOLUTION SUBCUTANEOUS
Qty: 2 ML | Refills: 0 | OUTPATIENT
Start: 2025-07-02

## 2025-07-03 ENCOUNTER — APPOINTMENT (OUTPATIENT)
Dept: LAB | Facility: CLINIC | Age: 30
End: 2025-07-03
Payer: COMMERCIAL

## 2025-07-03 ENCOUNTER — APPOINTMENT (OUTPATIENT)
Dept: LAB | Facility: HOSPITAL | Age: 30
End: 2025-07-03
Payer: COMMERCIAL

## 2025-07-03 ENCOUNTER — ANNUAL EXAM (OUTPATIENT)
Age: 30
End: 2025-07-03
Payer: COMMERCIAL

## 2025-07-03 ENCOUNTER — OFFICE VISIT (OUTPATIENT)
Dept: URGENT CARE | Facility: CLINIC | Age: 30
End: 2025-07-03
Payer: COMMERCIAL

## 2025-07-03 ENCOUNTER — APPOINTMENT (OUTPATIENT)
Dept: RADIOLOGY | Facility: CLINIC | Age: 30
End: 2025-07-03
Attending: PHYSICIAN ASSISTANT
Payer: COMMERCIAL

## 2025-07-03 VITALS
WEIGHT: 158.8 LBS | RESPIRATION RATE: 18 BRPM | TEMPERATURE: 97.3 F | HEART RATE: 84 BPM | OXYGEN SATURATION: 99 % | BODY MASS INDEX: 29.04 KG/M2

## 2025-07-03 VITALS
BODY MASS INDEX: 29.26 KG/M2 | SYSTOLIC BLOOD PRESSURE: 122 MMHG | DIASTOLIC BLOOD PRESSURE: 72 MMHG | WEIGHT: 159 LBS | HEIGHT: 62 IN

## 2025-07-03 DIAGNOSIS — Z01.419 ENCOUNTER FOR ANNUAL ROUTINE GYNECOLOGICAL EXAMINATION: Primary | ICD-10-CM

## 2025-07-03 DIAGNOSIS — Z11.51 SCREENING FOR HUMAN PAPILLOMAVIRUS (HPV): ICD-10-CM

## 2025-07-03 DIAGNOSIS — S69.91XA INJURY OF FINGER OF RIGHT HAND, INITIAL ENCOUNTER: ICD-10-CM

## 2025-07-03 DIAGNOSIS — S69.91XA INJURY OF FINGER OF RIGHT HAND, INITIAL ENCOUNTER: Primary | ICD-10-CM

## 2025-07-03 DIAGNOSIS — Z12.4 SCREENING FOR CERVICAL CANCER: ICD-10-CM

## 2025-07-03 PROCEDURE — 99213 OFFICE O/P EST LOW 20 MIN: CPT | Performed by: PHYSICIAN ASSISTANT

## 2025-07-03 PROCEDURE — G0476 HPV COMBO ASSAY CA SCREEN: HCPCS | Performed by: OBSTETRICS & GYNECOLOGY

## 2025-07-03 PROCEDURE — 73140 X-RAY EXAM OF FINGER(S): CPT

## 2025-07-03 PROCEDURE — G0145 SCR C/V CYTO,THINLAYER,RESCR: HCPCS | Performed by: OBSTETRICS & GYNECOLOGY

## 2025-07-03 PROCEDURE — S0612 ANNUAL GYNECOLOGICAL EXAMINA: HCPCS | Performed by: OBSTETRICS & GYNECOLOGY

## 2025-07-03 NOTE — PROGRESS NOTES
[facility]      NAME: Alejandro Lawler is a 30 y.o. female  : 1995    MRN: [id number]  DATE: July 3, 2025  TIME: 3:48 PM    Assessment and Plan   Injury of finger of right hand, initial encounter [S69.91XA]  1. Injury of finger of right hand, initial encounter  XR finger right fifth digit-pinkie          Patient Instructions   Xray appears negative for any fracture.  Will follow up with radiologist report when available.  Recommend elevating body part, icing the area every 2 hours for 20-30 minutes, take Ibuprofen every 6-8 hours to reduce inflammation.  If not improving over the next week, follow up with PCP or orthopedics.    Risks and benefits discussed. Patient understands and agrees with the plan.      If tests have been performed at [facility], our office will contact you with results if changes need to be made to the care plan discussed with you at the visit.  You can review your full results on [facility]'s MyChart.     Follow up with PCP in 3-5 days.      If any of the following occur, please report to your nearest ED for evaluation or call 911.   Difficultly breathing or shortness of breath  Chest pain  Acutely worsening symptoms.   To present to the ER if symptoms worsen.  Chief Complaint     Chief Complaint   Patient presents with    Finger Injury     Today leg of an ice hockey table fell onto left pinky, worsening pain and bruising         History of Present Illness   Alejandro Lawler presents to the clinic c/o    Hand Injury   The incident occurred 1 to 3 hours ago. The incident occurred at home. The injury mechanism was a direct blow (ice table fell on her right pinky). Pain location: right pinky. The pain does not radiate. The pain is at a severity of 8/10. The pain is moderate. Associated symptoms include numbness and tingling. Pertinent negatives include no chest pain. Treatments tried: cold water, neosporin. The treatment provided mild relief.       Review of Systems   Review of Systems    Constitutional:  Negative for chills, diaphoresis, fatigue and fever.   HENT:  Negative for congestion, ear discharge, ear pain and facial swelling.    Eyes:  Negative for photophobia, pain, discharge, redness, itching and visual disturbance.   Respiratory:  Negative for apnea, cough, chest tightness, shortness of breath and wheezing.    Cardiovascular:  Negative for chest pain and palpitations.   Gastrointestinal:  Negative for abdominal pain.   Skin:  Negative for color change, rash and wound.   Neurological:  Positive for tingling and numbness. Negative for dizziness and headaches.   Hematological:  Negative for adenopathy.         Current Medications     Long-Term Medications[1]    Current Allergies     Allergies as of 07/03/2025    (No Known Allergies)            The following portions of the patient's history were reviewed and updated as appropriate: allergies, current medications, past family history, past medical history, past social history, past surgical history and problem list.  Past Medical History[2]  Past Surgical History[3]  Social History     Socioeconomic History    Marital status: /Civil Union     Spouse name: Not on file    Number of children: Not on file    Years of education: college    Highest education level: Not on file   Occupational History    Occupation: Housewife   Tobacco Use    Smoking status: Never    Smokeless tobacco: Never   Vaping Use    Vaping status: Never Used   Substance and Sexual Activity    Alcohol use: No    Drug use: No    Sexual activity: Yes     Partners: Male     Birth control/protection: Condom Male   Other Topics Concern    Not on file   Social History Narrative    [employer]    Would accept blood products      Social Drivers of Health     Financial Resource Strain: Not on file   Food Insecurity: No Food Insecurity (9/15/2024)    Nursing - Inadequate Food Risk Classification     Worried About Running Out of Food in the Last Year: Never true     Ran Out of Food  in the Last Year: Never true     Ran Out of Food in the Last Year: Not on file   Transportation Needs: No Transportation Needs (9/15/2024)    PRAPARE - Transportation     Lack of Transportation (Medical): No     Lack of Transportation (Non-Medical): No   Physical Activity: Not on file   Stress: Not on file   Social Connections: Not on file   Intimate Partner Violence: Not on file   Housing Stability: Unknown (9/15/2024)    Housing Stability Vital Sign     Unable to Pay for Housing in the Last Year: No     Number of Times Moved in the Last Year: Not on file     Homeless in the Last Year: Not on file       Objective   Pulse 84   Temp (!) 97.3 °F (36.3 °C) (Tympanic)   Resp 18   Wt 72 kg (158 lb 12.8 oz)   LMP 06/10/2025 (Exact Date)   SpO2 99%   BMI 29.04 kg/m²      Physical Exam     Physical Exam  Vitals and nursing note reviewed.   Constitutional:       General: She is not in acute distress.     Appearance: She is well-developed. She is not diaphoretic.   HENT:      Head: Normocephalic and atraumatic.      Right Ear: External ear normal.      Left Ear: External ear normal.      Nose: Nose normal.      Mouth/Throat:      Mouth: Mucous membranes are moist.     Eyes:      General: No scleral icterus.        Right eye: No discharge.         Left eye: No discharge.      Conjunctiva/sclera: Conjunctivae normal.       Cardiovascular:      Rate and Rhythm: Normal rate and regular rhythm.      Heart sounds: Normal heart sounds. No murmur heard.     No friction rub. No gallop.   Pulmonary:      Effort: Pulmonary effort is normal. No respiratory distress.      Breath sounds: Normal breath sounds. No decreased breath sounds, wheezing, rhonchi or rales.     Musculoskeletal:        Hands:      Skin:     General: Skin is warm and dry.      Coloration: Skin is not pale.      Findings: No erythema or rash.     Neurological:      Mental Status: She is alert and oriented to person, place, and time.     Psychiatric:          Behavior: Behavior normal.         Thought Content: Thought content normal.         Judgment: Judgment normal.         Cristy Caldwell PA-C             [1]   Long-Term Medications   Medication Sig Dispense Refill    levothyroxine 112 mcg tablet Take 1 tablet (112 mcg total) by mouth daily 90 tablet 1   [2]   Past Medical History:  Diagnosis Date    Acid reflux     ASNHL (asymmetrical sensorineural hearing loss) 2024    Disease of thyroid gland 2012    Fatigue     GERD (gastroesophageal reflux disease)     Hashimoto's disease     Heat rash 2024    Lactose intolerance     Neck pain 02/15/2023    Otitis media     Sinusitis     Sleep difficulties     Snoring 2024    Tonsillitis     Varicella     Yeast dermatitis 2018   [3]   Past Surgical History:  Procedure Laterality Date    ADENOIDECTOMY       SECTION      MYRINGOTOMY      NOSE SURGERY      TN TONSILLECTOMY & ADENOIDECTOMY AGE 10/> Bilateral 2024    Procedure: TONSILLECTOMY & ADENOIDECTOMY;  Surgeon: Cortes Gonzalez MD;  Location: [facility];  Service: ENT    TONSILLECTOMY      UPPER GASTROINTESTINAL ENDOSCOPY

## 2025-07-03 NOTE — PROGRESS NOTES
"Annual Wellness Visit  Name: Alejandro Lawler      : 1995      MRN: [id number]  Encounter Provider: Sarah Chahal MD  Encounter Date: 7/3/2025   Encounter department: [facility]    30 y.o.  yo presents today for her annual exam.:  Assessment & Plan  Encounter for annual routine gynecological examination         Screening for cervical cancer    Orders:    Liquid-based pap, screening    Screening for human papillomavirus (HPV)    Orders:    Liquid-based pap, screening             History of Present Illness     Alejandro Lawler is a 30 y.o. female who presents for annual well woman exam.    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     GYN:  Denies vaginal discharge, labial erythema or lesions, dyspareunia.  Contraception: condoms.  Tried Mirena  - had worsening pelvic pain/abnormal bleeding  Patient is sexually active with one partner.  Reports  has was sounds like genital warts, she does not have any    Period Cycle (Days): 28  Period Duration (Days): 5-6  Period Pattern: Regular  Menstrual Flow: Moderate  Menstrual Control: Maxi pad  Menstrual Control Change Freq (Hours): 2  Dysmenorrhea: None      OB:   female  , followed by [facility] - arrest descent (9lb baby)  Not planning another pregnancy yet, may consider in 2yrs  Aware conception is not recommended while on Wegovy    :  No dysuria, urinary frequency or urgency.  No hematuria, flank pain, incontinence.  No constipation, diarrhea    Breast:  No breast mass, skin changes, dimpling, reddening, nipple retraction.  No breast discharge.  Stopped breastfeeding about 5 months ago  Occasionally gets some discomfort outer quadrant left breast    General:  ETOH use: no  Tobacco use: no  Recreational drug use: no    Screening:  Cervical cancer: 23 - NILM, + Other HR HPV     Review of Systems as per HPI       Objective   /72   Ht 5' 2\" (1.575 m)   Wt 72.1 kg (159 lb) "   LMP 06/10/2025 (Exact Date)   Breastfeeding No   BMI 29.08 kg/m²      Physical Exam  Vitals reviewed.   Constitutional:       General: She is not in acute distress.     Appearance: Normal appearance. She is well-developed.   Pulmonary:      Effort: No respiratory distress.   Chest:   Breasts:     Right: Normal. No swelling, bleeding, inverted nipple, mass, nipple discharge, skin change or tenderness.      Left: Normal. No swelling, bleeding, inverted nipple, mass, nipple discharge, skin change or tenderness.   Abdominal:      Palpations: Abdomen is soft.      Tenderness: There is no abdominal tenderness.   Genitourinary:     General: Normal vulva.      Labia:         Right: No rash or lesion.         Left: No rash or lesion.       Vagina: Normal. No vaginal discharge.      Cervix: No cervical motion tenderness or discharge.      Uterus: Normal. Not enlarged and not tender.       Adnexa:         Right: No mass or tenderness.          Left: No mass or tenderness.     Lymphadenopathy:      Upper Body:      Right upper body: No axillary adenopathy.      Left upper body: No axillary adenopathy.     Skin:     General: Skin is warm and dry.     Neurological:      Mental Status: She is alert.

## 2025-07-06 LAB
HPV HR 12 DNA CVX QL NAA+PROBE: NEGATIVE
HPV16 DNA CVX QL NAA+PROBE: NEGATIVE
HPV18 DNA CVX QL NAA+PROBE: NEGATIVE

## 2025-07-09 LAB
LAB AP GYN PRIMARY INTERPRETATION: NORMAL
Lab: NORMAL

## 2025-07-28 DIAGNOSIS — E66.811 CLASS 1 OBESITY DUE TO EXCESS CALORIES WITHOUT SERIOUS COMORBIDITY WITH BODY MASS INDEX (BMI) OF 32.0 TO 32.9 IN ADULT: ICD-10-CM

## 2025-07-28 DIAGNOSIS — E66.09 CLASS 1 OBESITY DUE TO EXCESS CALORIES WITHOUT SERIOUS COMORBIDITY WITH BODY MASS INDEX (BMI) OF 32.0 TO 32.9 IN ADULT: ICD-10-CM

## 2025-07-30 DIAGNOSIS — E66.09 CLASS 1 OBESITY DUE TO EXCESS CALORIES WITHOUT SERIOUS COMORBIDITY WITH BODY MASS INDEX (BMI) OF 32.0 TO 32.9 IN ADULT: Primary | ICD-10-CM

## 2025-07-30 DIAGNOSIS — E66.811 CLASS 1 OBESITY DUE TO EXCESS CALORIES WITHOUT SERIOUS COMORBIDITY WITH BODY MASS INDEX (BMI) OF 32.0 TO 32.9 IN ADULT: Primary | ICD-10-CM

## 2025-07-30 DIAGNOSIS — E66.811 CLASS 1 OBESITY DUE TO EXCESS CALORIES WITHOUT SERIOUS COMORBIDITY WITH BODY MASS INDEX (BMI) OF 32.0 TO 32.9 IN ADULT: ICD-10-CM

## 2025-07-30 DIAGNOSIS — E66.09 CLASS 1 OBESITY DUE TO EXCESS CALORIES WITHOUT SERIOUS COMORBIDITY WITH BODY MASS INDEX (BMI) OF 32.0 TO 32.9 IN ADULT: ICD-10-CM

## 2025-07-30 RX ORDER — SEMAGLUTIDE 1.7 MG/.75ML
1.7 INJECTION, SOLUTION SUBCUTANEOUS WEEKLY
OUTPATIENT
Start: 2025-07-30

## 2025-07-30 RX ORDER — SEMAGLUTIDE 1.7 MG/.75ML
INJECTION, SOLUTION SUBCUTANEOUS
Qty: 3 ML | Refills: 2 | Status: SHIPPED | OUTPATIENT
Start: 2025-07-30

## 2025-07-30 RX ORDER — SEMAGLUTIDE 1.7 MG/.75ML
INJECTION, SOLUTION SUBCUTANEOUS
Qty: 3 ML | Refills: 0 | OUTPATIENT
Start: 2025-07-30

## 2025-07-30 RX ORDER — SEMAGLUTIDE 2.4 MG/.75ML
INJECTION, SOLUTION SUBCUTANEOUS
Qty: 3 ML | Refills: 0 | Status: SHIPPED | OUTPATIENT
Start: 2025-07-30 | End: 2025-07-30

## 2025-08-11 ENCOUNTER — OFFICE VISIT (OUTPATIENT)
Dept: FAMILY MEDICINE CLINIC | Facility: CLINIC | Age: 30
End: 2025-08-11
Payer: COMMERCIAL

## 2025-08-11 PROBLEM — J34.3 HYPERTROPHY OF NASAL TURBINATES: Status: ACTIVE | Noted: 2025-08-11

## 2025-08-11 PROBLEM — F32.A DEPRESSION: Status: RESOLVED | Noted: 2025-02-17 | Resolved: 2025-08-11

## (undated) DEVICE — STERILE BETHLEHEM T AND A PACK: Brand: CARDINAL HEALTH

## (undated) DEVICE — CYLINDRICAL SPONGES: Brand: DEROYAL

## (undated) DEVICE — CATH URET 12FR RED RUBBER

## (undated) DEVICE — ANTI-FOG SOLUTION WITH FOAM PAD: Brand: DEVON

## (undated) DEVICE — MEDI-VAC YANK SUCT HNDL W/TPRD BULBOUS TIP: Brand: CARDINAL HEALTH

## (undated) DEVICE — SURGICEL FIBRILLAR 1 X 2

## (undated) DEVICE — DRAPE SHEET THREE QUARTER

## (undated) DEVICE — ELECTRODE BLADE MOD E-Z CLEAN 2.5IN 6.4CM -0012M

## (undated) DEVICE — SUCTION COAGULATOR 12FR HAND CONTROL MEGADYNE

## (undated) DEVICE — GLOVE SRG BIOGEL 8

## (undated) DEVICE — UTILITY MARKER,BLACK WITH LABELS: Brand: DEVON

## (undated) DEVICE — SYRINGE BULB 2 OZ

## (undated) DEVICE — PENCIL ELECTROSURG E-Z CLEAN -0035H

## (undated) DEVICE — SPECIMEN CONTAINER STERILE PEEL PACK

## (undated) DEVICE — HEMOSTATIC MATRIX SURGIFLO 8ML W/THROMBIN

## (undated) DEVICE — TUBING SUCTION 5MM X 12 FT

## (undated) DEVICE — SPONGE,TONSIL,DBL STRNG,XRAY,SM,7/8",ST: Brand: MEDLINE INDUSTRIES, INC.